# Patient Record
Sex: FEMALE | Race: WHITE | NOT HISPANIC OR LATINO | Employment: OTHER
[De-identification: names, ages, dates, MRNs, and addresses within clinical notes are randomized per-mention and may not be internally consistent; named-entity substitution may affect disease eponyms.]

---

## 2019-09-30 ENCOUNTER — HEALTH MAINTENANCE LETTER (OUTPATIENT)
Age: 79
End: 2019-09-30

## 2020-03-15 ENCOUNTER — HEALTH MAINTENANCE LETTER (OUTPATIENT)
Age: 80
End: 2020-03-15

## 2021-01-15 ENCOUNTER — HEALTH MAINTENANCE LETTER (OUTPATIENT)
Age: 81
End: 2021-01-15

## 2021-02-09 ENCOUNTER — IMMUNIZATION (OUTPATIENT)
Dept: NURSING | Facility: CLINIC | Age: 81
End: 2021-02-09
Payer: COMMERCIAL

## 2021-02-09 PROCEDURE — 0001A PR COVID VAC PFIZER DIL RECON 30 MCG/0.3 ML IM: CPT

## 2021-02-09 PROCEDURE — 91300 PR COVID VAC PFIZER DIL RECON 30 MCG/0.3 ML IM: CPT

## 2021-03-02 ENCOUNTER — IMMUNIZATION (OUTPATIENT)
Dept: NURSING | Facility: CLINIC | Age: 81
End: 2021-03-02
Attending: INTERNAL MEDICINE
Payer: COMMERCIAL

## 2021-03-02 PROCEDURE — 91300 PR COVID VAC PFIZER DIL RECON 30 MCG/0.3 ML IM: CPT

## 2021-03-02 PROCEDURE — 0002A PR COVID VAC PFIZER DIL RECON 30 MCG/0.3 ML IM: CPT

## 2021-05-09 ENCOUNTER — HEALTH MAINTENANCE LETTER (OUTPATIENT)
Age: 81
End: 2021-05-09

## 2021-10-24 ENCOUNTER — HEALTH MAINTENANCE LETTER (OUTPATIENT)
Age: 81
End: 2021-10-24

## 2022-06-05 ENCOUNTER — HEALTH MAINTENANCE LETTER (OUTPATIENT)
Age: 82
End: 2022-06-05

## 2022-08-07 ENCOUNTER — HOSPITAL ENCOUNTER (INPATIENT)
Facility: CLINIC | Age: 82
LOS: 1 days | Discharge: HOME | DRG: 602 | End: 2022-08-10
Attending: EMERGENCY MEDICINE | Admitting: HOSPITALIST
Payer: COMMERCIAL

## 2022-08-07 ENCOUNTER — APPOINTMENT (OUTPATIENT)
Dept: CT IMAGING | Facility: CLINIC | Age: 82
DRG: 602 | End: 2022-08-07
Attending: STUDENT IN AN ORGANIZED HEALTH CARE EDUCATION/TRAINING PROGRAM
Payer: COMMERCIAL

## 2022-08-07 ENCOUNTER — APPOINTMENT (OUTPATIENT)
Dept: ULTRASOUND IMAGING | Facility: CLINIC | Age: 82
DRG: 602 | End: 2022-08-07
Attending: STUDENT IN AN ORGANIZED HEALTH CARE EDUCATION/TRAINING PROGRAM
Payer: COMMERCIAL

## 2022-08-07 DIAGNOSIS — J69.0 ASPIRATION PNEUMONIA, UNSPECIFIED ASPIRATION PNEUMONIA TYPE, UNSPECIFIED LATERALITY, UNSPECIFIED PART OF LUNG (H): ICD-10-CM

## 2022-08-07 DIAGNOSIS — L50.9 HIVES: Primary | ICD-10-CM

## 2022-08-07 DIAGNOSIS — L03.115 CELLULITIS OF RIGHT LOWER EXTREMITY: ICD-10-CM

## 2022-08-07 LAB
ANION GAP SERPL CALCULATED.3IONS-SCNC: 5 MMOL/L (ref 7–15)
BASOPHILS # BLD AUTO: 0 10E3/UL (ref 0–0.2)
BASOPHILS NFR BLD AUTO: 0 %
BUN SERPL-MCNC: 14.1 MG/DL (ref 8–23)
CALCIUM SERPL-MCNC: 8.8 MG/DL (ref 8.8–10.2)
CHLORIDE SERPL-SCNC: 103 MMOL/L (ref 98–107)
CREAT SERPL-MCNC: 0.85 MG/DL (ref 0.51–0.95)
D DIMER PPP FEU-MCNC: 1.49 UG/ML FEU (ref 0–0.5)
DEPRECATED HCO3 PLAS-SCNC: 29 MMOL/L (ref 22–29)
EOSINOPHIL # BLD AUTO: 0.3 10E3/UL (ref 0–0.7)
EOSINOPHIL NFR BLD AUTO: 4 %
ERYTHROCYTE [DISTWIDTH] IN BLOOD BY AUTOMATED COUNT: 13 % (ref 10–15)
FLUAV RNA SPEC QL NAA+PROBE: NEGATIVE
FLUBV RNA RESP QL NAA+PROBE: NEGATIVE
GFR SERPL CREATININE-BSD FRML MDRD: 68 ML/MIN/1.73M2
GLUCOSE SERPL-MCNC: 99 MG/DL (ref 70–99)
HCT VFR BLD AUTO: 45.1 % (ref 35–47)
HGB BLD-MCNC: 13.9 G/DL (ref 11.7–15.7)
IMM GRANULOCYTES # BLD: 0 10E3/UL
IMM GRANULOCYTES NFR BLD: 0 %
LYMPHOCYTES # BLD AUTO: 1.2 10E3/UL (ref 0.8–5.3)
LYMPHOCYTES NFR BLD AUTO: 16 %
MCH RBC QN AUTO: 29.8 PG (ref 26.5–33)
MCHC RBC AUTO-ENTMCNC: 30.8 G/DL (ref 31.5–36.5)
MCV RBC AUTO: 97 FL (ref 78–100)
MONOCYTES # BLD AUTO: 0.5 10E3/UL (ref 0–1.3)
MONOCYTES NFR BLD AUTO: 7 %
NEUTROPHILS # BLD AUTO: 5.2 10E3/UL (ref 1.6–8.3)
NEUTROPHILS NFR BLD AUTO: 73 %
NRBC # BLD AUTO: 0 10E3/UL
NRBC BLD AUTO-RTO: 0 /100
PLATELET # BLD AUTO: 197 10E3/UL (ref 150–450)
POTASSIUM SERPL-SCNC: 4.5 MMOL/L (ref 3.4–5.3)
RBC # BLD AUTO: 4.67 10E6/UL (ref 3.8–5.2)
RSV RNA SPEC NAA+PROBE: NEGATIVE
SARS-COV-2 RNA RESP QL NAA+PROBE: NEGATIVE
SODIUM SERPL-SCNC: 137 MMOL/L (ref 136–145)
WBC # BLD AUTO: 7.1 10E3/UL (ref 4–11)

## 2022-08-07 PROCEDURE — 36415 COLL VENOUS BLD VENIPUNCTURE: CPT | Performed by: EMERGENCY MEDICINE

## 2022-08-07 PROCEDURE — 80048 BASIC METABOLIC PNL TOTAL CA: CPT | Performed by: EMERGENCY MEDICINE

## 2022-08-07 PROCEDURE — 71275 CT ANGIOGRAPHY CHEST: CPT

## 2022-08-07 PROCEDURE — 36415 COLL VENOUS BLD VENIPUNCTURE: CPT | Performed by: STUDENT IN AN ORGANIZED HEALTH CARE EDUCATION/TRAINING PROGRAM

## 2022-08-07 PROCEDURE — 250N000011 HC RX IP 250 OP 636: Performed by: HOSPITALIST

## 2022-08-07 PROCEDURE — C9803 HOPD COVID-19 SPEC COLLECT: HCPCS

## 2022-08-07 PROCEDURE — 96365 THER/PROPH/DIAG IV INF INIT: CPT

## 2022-08-07 PROCEDURE — 85025 COMPLETE CBC W/AUTO DIFF WBC: CPT | Performed by: EMERGENCY MEDICINE

## 2022-08-07 PROCEDURE — 250N000009 HC RX 250: Performed by: EMERGENCY MEDICINE

## 2022-08-07 PROCEDURE — 250N000013 HC RX MED GY IP 250 OP 250 PS 637: Performed by: HOSPITALIST

## 2022-08-07 PROCEDURE — 96375 TX/PRO/DX INJ NEW DRUG ADDON: CPT

## 2022-08-07 PROCEDURE — G0378 HOSPITAL OBSERVATION PER HR: HCPCS

## 2022-08-07 PROCEDURE — 96376 TX/PRO/DX INJ SAME DRUG ADON: CPT

## 2022-08-07 PROCEDURE — 94640 AIRWAY INHALATION TREATMENT: CPT

## 2022-08-07 PROCEDURE — 85379 FIBRIN DEGRADATION QUANT: CPT | Performed by: STUDENT IN AN ORGANIZED HEALTH CARE EDUCATION/TRAINING PROGRAM

## 2022-08-07 PROCEDURE — 99220 PR INITIAL OBSERVATION CARE,LEVEL III: CPT | Performed by: HOSPITALIST

## 2022-08-07 PROCEDURE — 250N000011 HC RX IP 250 OP 636: Performed by: EMERGENCY MEDICINE

## 2022-08-07 PROCEDURE — 93970 EXTREMITY STUDY: CPT

## 2022-08-07 PROCEDURE — 87637 SARSCOV2&INF A&B&RSV AMP PRB: CPT | Performed by: STUDENT IN AN ORGANIZED HEALTH CARE EDUCATION/TRAINING PROGRAM

## 2022-08-07 PROCEDURE — 93005 ELECTROCARDIOGRAM TRACING: CPT

## 2022-08-07 PROCEDURE — 258N000003 HC RX IP 258 OP 636: Performed by: STUDENT IN AN ORGANIZED HEALTH CARE EDUCATION/TRAINING PROGRAM

## 2022-08-07 PROCEDURE — 250N000011 HC RX IP 250 OP 636: Performed by: STUDENT IN AN ORGANIZED HEALTH CARE EDUCATION/TRAINING PROGRAM

## 2022-08-07 PROCEDURE — 250N000009 HC RX 250: Performed by: STUDENT IN AN ORGANIZED HEALTH CARE EDUCATION/TRAINING PROGRAM

## 2022-08-07 PROCEDURE — 99285 EMERGENCY DEPT VISIT HI MDM: CPT | Mod: CS,25

## 2022-08-07 RX ORDER — ACETAMINOPHEN 650 MG/1
650 SUPPOSITORY RECTAL EVERY 6 HOURS PRN
Status: DISCONTINUED | OUTPATIENT
Start: 2022-08-07 | End: 2022-08-10 | Stop reason: HOSPADM

## 2022-08-07 RX ORDER — DIPHENHYDRAMINE HCL 25 MG
25 CAPSULE ORAL EVERY 6 HOURS PRN
Status: DISCONTINUED | OUTPATIENT
Start: 2022-08-07 | End: 2022-08-08

## 2022-08-07 RX ORDER — GUAIFENESIN 600 MG/1
600 TABLET, EXTENDED RELEASE ORAL 2 TIMES DAILY
Status: DISCONTINUED | OUTPATIENT
Start: 2022-08-07 | End: 2022-08-09

## 2022-08-07 RX ORDER — AZITHROMYCIN 500 MG/5ML
500 INJECTION, POWDER, LYOPHILIZED, FOR SOLUTION INTRAVENOUS ONCE
Status: COMPLETED | OUTPATIENT
Start: 2022-08-07 | End: 2022-08-07

## 2022-08-07 RX ORDER — ONDANSETRON 4 MG/1
4 TABLET, ORALLY DISINTEGRATING ORAL EVERY 6 HOURS PRN
Status: DISCONTINUED | OUTPATIENT
Start: 2022-08-07 | End: 2022-08-10 | Stop reason: HOSPADM

## 2022-08-07 RX ORDER — DIPHENHYDRAMINE HYDROCHLORIDE 50 MG/ML
25 INJECTION INTRAMUSCULAR; INTRAVENOUS EVERY 6 HOURS PRN
Status: DISCONTINUED | OUTPATIENT
Start: 2022-08-07 | End: 2022-08-08

## 2022-08-07 RX ORDER — CEFTRIAXONE 1 G/1
1 INJECTION, POWDER, FOR SOLUTION INTRAMUSCULAR; INTRAVENOUS ONCE
Status: COMPLETED | OUTPATIENT
Start: 2022-08-07 | End: 2022-08-07

## 2022-08-07 RX ORDER — AMPICILLIN AND SULBACTAM 2; 1 G/1; G/1
3 INJECTION, POWDER, FOR SOLUTION INTRAMUSCULAR; INTRAVENOUS EVERY 6 HOURS
Status: DISCONTINUED | OUTPATIENT
Start: 2022-08-07 | End: 2022-08-10 | Stop reason: HOSPADM

## 2022-08-07 RX ORDER — PREDNISONE 20 MG/1
40 TABLET ORAL DAILY
Status: DISCONTINUED | OUTPATIENT
Start: 2022-08-08 | End: 2022-08-10 | Stop reason: HOSPADM

## 2022-08-07 RX ORDER — IOPAMIDOL 755 MG/ML
500 INJECTION, SOLUTION INTRAVASCULAR ONCE
Status: COMPLETED | OUTPATIENT
Start: 2022-08-07 | End: 2022-08-07

## 2022-08-07 RX ORDER — ONDANSETRON 2 MG/ML
4 INJECTION INTRAMUSCULAR; INTRAVENOUS EVERY 6 HOURS PRN
Status: DISCONTINUED | OUTPATIENT
Start: 2022-08-07 | End: 2022-08-10 | Stop reason: HOSPADM

## 2022-08-07 RX ORDER — ACETAMINOPHEN 325 MG/1
650 TABLET ORAL EVERY 6 HOURS PRN
Status: DISCONTINUED | OUTPATIENT
Start: 2022-08-07 | End: 2022-08-10 | Stop reason: HOSPADM

## 2022-08-07 RX ORDER — METHYLPREDNISOLONE SODIUM SUCCINATE 125 MG/2ML
125 INJECTION, POWDER, LYOPHILIZED, FOR SOLUTION INTRAMUSCULAR; INTRAVENOUS ONCE
Status: COMPLETED | OUTPATIENT
Start: 2022-08-07 | End: 2022-08-07

## 2022-08-07 RX ORDER — IPRATROPIUM BROMIDE AND ALBUTEROL SULFATE 2.5; .5 MG/3ML; MG/3ML
6 SOLUTION RESPIRATORY (INHALATION) ONCE
Status: COMPLETED | OUTPATIENT
Start: 2022-08-07 | End: 2022-08-07

## 2022-08-07 RX ORDER — CETIRIZINE HYDROCHLORIDE 10 MG/1
10 TABLET ORAL 2 TIMES DAILY
Status: DISCONTINUED | OUTPATIENT
Start: 2022-08-07 | End: 2022-08-10 | Stop reason: HOSPADM

## 2022-08-07 RX ORDER — BENZONATATE 100 MG/1
100 CAPSULE ORAL 3 TIMES DAILY PRN
Status: DISCONTINUED | OUTPATIENT
Start: 2022-08-07 | End: 2022-08-09

## 2022-08-07 RX ORDER — CETIRIZINE HYDROCHLORIDE 10 MG/1
10 TABLET ORAL 2 TIMES DAILY
COMMUNITY
End: 2023-11-20

## 2022-08-07 RX ADMIN — CETIRIZINE HYDROCHLORIDE 10 MG: 10 TABLET, FILM COATED ORAL at 19:46

## 2022-08-07 RX ADMIN — AMPICILLIN SODIUM AND SULBACTAM SODIUM 3 G: 2; 1 INJECTION, POWDER, FOR SOLUTION INTRAMUSCULAR; INTRAVENOUS at 19:43

## 2022-08-07 RX ADMIN — GUAIFENESIN 600 MG: 600 TABLET, EXTENDED RELEASE ORAL at 20:24

## 2022-08-07 RX ADMIN — CEFTRIAXONE SODIUM 1 G: 1 INJECTION, POWDER, FOR SOLUTION INTRAMUSCULAR; INTRAVENOUS at 16:13

## 2022-08-07 RX ADMIN — SODIUM CHLORIDE 70 ML: 9 INJECTION, SOLUTION INTRAVENOUS at 15:47

## 2022-08-07 RX ADMIN — BENZONATATE 100 MG: 100 CAPSULE ORAL at 20:24

## 2022-08-07 RX ADMIN — IOPAMIDOL 65 ML: 755 INJECTION, SOLUTION INTRAVENOUS at 15:47

## 2022-08-07 RX ADMIN — METHYLPREDNISOLONE SODIUM SUCCINATE 125 MG: 125 INJECTION, POWDER, FOR SOLUTION INTRAMUSCULAR; INTRAVENOUS at 16:12

## 2022-08-07 RX ADMIN — IPRATROPIUM BROMIDE AND ALBUTEROL SULFATE 6 ML: .5; 3 SOLUTION RESPIRATORY (INHALATION) at 16:13

## 2022-08-07 RX ADMIN — AZITHROMYCIN MONOHYDRATE 500 MG: 500 INJECTION, POWDER, LYOPHILIZED, FOR SOLUTION INTRAVENOUS at 17:38

## 2022-08-07 ASSESSMENT — ENCOUNTER SYMPTOMS
COUGH: 1
NAUSEA: 0
HEMATURIA: 0
SORE THROAT: 0
SINUS PAIN: 0
ABDOMINAL PAIN: 0
FEVER: 0
SHORTNESS OF BREATH: 1
HEADACHES: 0
VOMITING: 0
DIARRHEA: 0
DIZZINESS: 0
DYSURIA: 0
CHILLS: 0

## 2022-08-07 NOTE — ED PROVIDER NOTES
History     Chief Complaint:  Rash and shortness of breath     HPI   Keisha Kolb is a 82 year old female with past medical history including TIAs, anxiety, and hyperlipidemia, who presents with rash and shortness of breath.  Patient reports that she was diagnosed TIAs approximately 1/2 months ago, and she was initiated on Aricept.  She had hives to her bilateral lower extremities following this, and she was initiated on Benadryl.  She had increased confusion with benadryl, and she switched to prednisone.  She was started on Zyrtec twice a day by her primary care provider on 7/11/2022, and she is still taking this.  She states that it had been helping for a couple of weeks, but then the hives began to return on 7/29/2022.  She states that 11 days ago, she also began to have a dry cough.  She tested negative for COVID.  She has been having increased shortness of breath as well as wheezing since yesterday.  She states she has pain under her bilateral ribs, right greater than left when she coughs.  She denies chest pain, abdominal pain, nausea, vomiting, or headache.         ROS:  Review of Systems   Constitutional: Negative for chills and fever.   HENT: Negative for ear pain, sinus pain and sore throat.    Respiratory: Positive for cough and shortness of breath.    Cardiovascular: Positive for leg swelling. Negative for chest pain.   Gastrointestinal: Negative for abdominal pain, diarrhea, nausea and vomiting.   Genitourinary: Negative for dysuria and hematuria.   Skin: Positive for rash.   Neurological: Negative for dizziness and headaches.   All other systems reviewed and are negative.      Allergies:  No Known Allergies     Medications:    ASPIRIN PO  CITALOPRAM HYDROBROMIDE PO  LORAZEPAM PO  SIMVASTATIN PO  traMADol (ULTRAM) 50 MG tablet        Past Medical History:    Past Medical History:   Diagnosis Date     Anxiety        Past Surgical History:    Past Surgical History:   Procedure Laterality Date      "masectomy          Family History:    family history is not on file.    Social History:  20-pack-year smoking history.  Not currently smoking.  Presents to the ED with her granddaughter, Annika.  PCP: [facility], [facility] [place]     Physical Exam     Patient Vitals for the past 24 hrs:   BP Temp Temp src Pulse Resp SpO2 Height Weight   08/07/22 1400 (!) 163/80 -- -- 73 -- 97 % -- --   08/07/22 1335 -- 98.4  F (36.9  C) Oral -- -- -- -- --   08/07/22 1330 (!) 169/76 -- -- -- -- 98 % -- --   08/07/22 1115 (!) 135/119 -- -- -- 20 -- -- --   08/07/22 1113 -- 97.7  F (36.5  C) Oral 68 -- 95 % 1.626 m (5' 4\") 84.5 kg (186 lb 4.6 oz)        Physical Exam  Vitals: Reviewed, as above.   General: Alert and oriented, in mild distress. Resting on bed.  Skin: Warm and well-perfused.  Urticarial, blanching rash to bilateral lower extremities.  Multiple scabs in various stages of healing to right anterior lower leg. Erythema extending from right foot to right mid shin.  Please see wound photos, below.  HEENT: Head: Normocephalic, atraumatic. Facial features symmetric. Eyes: Conjunctiva pink, sclera white. EOMs grossly intact. Ears: Auricles without lesion, erythema, or edema. Mouth and throat: Lips are moist with no chapping, lesions, or edema, Buccal mucosa is pink and moist without lesions. Oropharyngeal mucosa is pink and moist with no erythema, edema, or exudate.   Neck: Supple with no lymphadenopathy. Full ROM.   Pulmonary: Chest wall expansion symmetric with no increased work of breathing. Lungs with end expiratory wheezes on auscultation bilaterally.   Cardiovascular: Heart RRR with no murmurs, rubs, or gallops. 2+ radial, dorsalis pedis, and tibialis posterior pulses bilaterally.  2+ pitting edema to bilateral lower extremities to the level of the mid shin, right slightly greater than left.  Abdominal: No hernias, scars, lesions, striae, or distension. Bowel sounds present and physiologic. Abdomen is soft and nontender to " light and deep palpation in all 4 quadrants with no guarding or rebound. No masses or organomegaly.   Musculoskeletal: Moves all extremities spontaneously.  Psych: Affect appropriate.  Answers questions appropriately. Patient appears calm.                   Emergency Department Course   ECG:  ECG results from 08/07/22   EKG 12-lead, tracing only     Value    Systolic Blood Pressure     Diastolic Blood Pressure     Ventricular Rate 68    Atrial Rate 68    NY Interval 148    QRS Duration 116        QTc 457    P Axis 37    R AXIS 68    T Axis 25    Interpretation ECG      Sinus rhythm  Incomplete right bundle branch block  Borderline ECG  No previous ECGs available         Imaging:  CT Chest Pulmonary Embolism w Contrast   Final Result   IMPRESSION:      1.  No pulmonary embolism.      2.  Nonaneurysmal aorta without dissection.      3.  Moderate lower lobe predominant airway debris, raising concern for aspiration.      4.  Mild airway thickening / bronchitis. Probable mild bronchiectasis.            US Lower Extremity Venous Duplex Bilateral   Final Result   IMPRESSION:   1.  No deep venous thrombosis in the bilateral lower extremities.             Laboratory:  Labs Ordered and Resulted from Time of ED Arrival to Time of ED Departure   BASIC METABOLIC PANEL - Abnormal       Result Value    Creatinine 0.85      Sodium 137      Potassium 4.5      Urea Nitrogen 14.1      Chloride 103      Carbon Dioxide (CO2) 29      Anion Gap 5 (*)     Glucose 99      GFR Estimate 68      Calcium 8.8     CBC WITH PLATELETS AND DIFFERENTIAL - Abnormal    WBC Count 7.1      RBC Count 4.67      Hemoglobin 13.9      Hematocrit 45.1      MCV 97      MCH 29.8      MCHC 30.8 (*)     RDW 13.0      Platelet Count 197      % Neutrophils 73      % Lymphocytes 16      % Monocytes 7      % Eosinophils 4      % Basophils 0      % Immature Granulocytes 0      NRBCs per 100 WBC 0      Absolute Neutrophils 5.2      Absolute Lymphocytes 1.2       Absolute Monocytes 0.5      Absolute Eosinophils 0.3      Absolute Basophils 0.0      Absolute Immature Granulocytes 0.0      Absolute NRBCs 0.0     D DIMER QUANTITATIVE - Abnormal    D-Dimer Quantitative 1.49 (*)    INFLUENZA A/B & SARS-COV2 PCR MULTIPLEX - Normal    Influenza A PCR Negative      Influenza B PCR Negative      RSV PCR Negative      SARS CoV2 PCR Negative          Emergency Department Course:     Reviewed:  I reviewed nursing notes, vitals and past medical history    Assessments?Consults:   ED Course as of 08/07/22 1706   Sun Aug 07, 2022   1310 I obtained history and examined the patient, as noted above.   1535 I rechecked the patient and explained findings.   1659 I spoke with Dr. Wheeler, hospitalist, regarding the patient.  He will accept the patient for admission.         Interventions:  Medications   azithromycin 500 mg (ZITHROMAX) in 0.9% NaCl 250 mL intermittent infusion 500 mg (has no administration in time range)   ipratropium - albuterol 0.5 mg/2.5 mg/3 mL (DUONEB) neb solution 6 mL (6 mLs Nebulization Given 8/7/22 1613)   cefTRIAXone (ROCEPHIN) 1 g vial to attach to  mL bag for ADULTS or NS 50 mL bag for PEDS (1 g Intravenous New Bag 8/7/22 1613)   methylPREDNISolone sodium succinate (solu-MEDROL) injection 125 mg (125 mg Intravenous Given 8/7/22 1612)   sodium chloride for CT scan flush use (70 mLs Intravenous Given 8/7/22 1547)   iopamidol (ISOVUE-370) solution 500 mL (65 mLs Intravenous Given 8/7/22 1547)        Disposition:  The patient was admitted to the hospital under the care of Dr. Wheeler.     Impression & Plan      Medical Decision Making:  Keisha is a 82 year old female with past medical history including TIAs, anxiety, and hyperlipidemia, who presents with rash and shortness of breath.  Please see HPI and physical exam for full details.  Differential diagnosis included cellulitis, DVT, PE, pneumonia, bronchitis, COVID, influenza, asthma or COPD exasperation, among others.   Patient has a physical exam concerning for right lower extremity cellulitis.  Please see wound photos.  Bilateral lower extremity Dopplers are thankfully negative for  DVT.  D-dimer was elevated, and CT angiogram was obtained, which was negative for PE.  This did reveal a vertebrae, concerning for aspiration.  Patient was initiated on ceftriaxone and azithromycin for cellulitis and possible mild aspiration pneumonia or pneumonitis.  Due to need for IV antibiotics, patient will benefit from inpatient treatment.  I discussed with Dr. Wheeler, who graciously accepts the patient for observation.        Diagnosis:    ICD-10-CM    1. Cellulitis of right lower extremity  L03.115    2. Aspiration pneumonia, unspecified aspiration pneumonia type, unspecified laterality, unspecified part of lung (H)  J69.0              Meliza Ch PA-C  08/07/22 1706

## 2022-08-07 NOTE — PHARMACY-ADMISSION MEDICATION HISTORY
Admission medication history interview status for this patient is complete. See [facility] admission navigator for allergy information, prior to admission medications and immunization status.     Medication history interview done, indicate source(s): Devendra (daughter) @ [phone]  Medication history resources (including written lists, pill bottles, clinic record):None      Changes made to PTA medication list:  Added: all  Changed: none  Reported as Not Taking: none  Removed: Ultram    Actions taken by pharmacist (provider contacted, etc):None     Additional medication history information:None    Medication reconciliation/reorder completed by provider prior to medication history?  n   (Y/N)     For patients on insulin therapy:   Do you use sliding scale insulin based on blood sugars?   What is your pre-meal insulin coverage?    Do you typically eat three meals a day?   How many times do you check your blood glucose per day?   How many episodes of hypoglycemia do you typically have per month?   Do you have a Continuous Glucose Monitor (CGM)?      Prior to Admission medications    Medication Sig Last Dose Taking? Auth Provider Long Term End Date   cetirizine (ZYRTEC) 10 MG tablet Take 10 mg by mouth 2 times daily 8/7/2022 at am Yes Unknown, Entered By History     CITALOPRAM HYDROBROMIDE PO Take 40 mg by mouth At Bedtime 8/6/2022 at Unknown time Yes Reported, Patient Yes    LORAZEPAM PO Take 1 mg by mouth every morning 8/7/2022 at am Yes Reported, Patient     SIMVASTATIN PO Take 20 mg by mouth At Bedtime 8/6/2022 at Unknown time Yes Reported, Patient Yes

## 2022-08-07 NOTE — ED TRIAGE NOTES
Pt arrives to the ED due to having a dry cough and increased SOB for past 11 days. States pain in her ribs when she coughs. Pt also has a rash to her right and left lower legs that she states has been going on for a couple weeks. Pt states that she was started on Aricept for memory issues and that is when the rash began. Stopped the medication and was put on prednisone and zyrtec to help with rash. Rash still not improving and is having increased redness, swelling to right lower leg. Pt has apt with allergist next week.

## 2022-08-07 NOTE — H&P
[facility]    History and Physical  Hospitalist       Date of Admission:  8/7/2022    Assessment & Plan   Keisha Kolb is a 82 year old female with a past medical history of anxiety, hyperlipidemia who presents with skin lesions.    #Urticarial skin lesions with likely superimposed cellulitis of RLE: Patient was at a wedding in mid June when she started to notice raised, itchy skin lesions on her bilateral lower extremity and lower back.  Not painful.  She did not have any associated fevers or chills.  She did recently start Aricept in the days prior which is since been discontinued.  She denies any changes in detergents or known bug bites.  No changes in clothing.  She could not think of any other exposures at that time.  She saw her primary care who prescribed her a prednisone course and cetirizine.  Her symptoms did improve but then recurred on/off.  She comes in today for recurrence of her lesions along with swelling and warmth of her right lower extremity.  She denies any chest pain.  She has had some shortness of breath over the last few days but no wheezing.  No nausea vomiting.  No chest pain.  No changes in her voice.  No known allergies.  -ER, patient afebrile and hemodynamically stable.  CBC without any leukocytosis.  BMP unremarkable.  D-dimer was positive.  CT PE was negative for PE but did show possible aspiration pneumonia.  Lower extremity ultrasound negative for DVT.  Patient was given Methylpred, ceftriaxone, azithromycin.  -Suspect right lower extremity cellulitis from excessive itching from urticarial skin lesions.  She is not septic.  These lesions have been ongoing for close to 2 months now.  -We will start prednisone 40 mg daily for 5 days.  Increase cetirizine to 10 mg twice daily.  Benadryl as needed for itching.  -We will treat with Unasyn for right lower extremity cellulitis and cover for aspiration.  Keep right lower extremity elevated.  -Patient does have an  appointment with allergist in the coming weeks.    #Possible aspiration pna: Patient has noted some shortness of breath but no wheezing.  No stridor.  CT PE negative for PE but did show possible aspiration.  She denies any nausea vomiting.  No difficulty swallowing.  -Unasyn as above.  She is saturating well on room air.  Monitor.    #Anxiety: Continue home meds once verified  #HL: Continue home statin    DVT Prophylaxis: Pneumatic Compression Devices  Code Status: DNR.  Okay with temporary intubation.  Would not want prolonged intubation.  Dispo: Registered observation.    Miky Wheeler MD    Primary Care Physician   [facility] [place] [facility]    Chief Complaint   Skin lesions, itching    History is obtained from the patient, patient's daughter, patient's chart and discussed with ER provider    History of Present Illness   Keisha Kolb is a 82 year old female with a past medical history of anxiety, hyperlipidemia who presents with skin lesions.    Patient was at a wedding in mid June when she started to notice raised, itchy skin lesions on her bilateral lower extremity and lower back.  Not painful.  She did not have any associated fevers or chills.  She did recently start Aricept in the days prior which is since been discontinued.  She denies any changes in detergents or known bug bites.  No changes in clothing.  She could not think of any other exposures at that time.  She saw her primary care who prescribed her a prednisone course and cetirizine.  Her symptoms did improve but then recurred on/off.  She comes in today for recurrence of her lesions along with swelling and warmth of her right lower extremity.  She denies any chest pain.  She has had some shortness of breath over the last few days but no wheezing.  No nausea vomiting.  No chest pain.  No changes in her voice.  No known allergies.    In the ER, patient afebrile and hemodynamically stable.  CBC without any leukocytosis.  BMP unremarkable.  D-dimer  was positive.  CT PE was negative for PE but did show possible aspiration pneumonia.  Lower extremity ultrasound negative for DVT.  Patient was given Methylpred, ceftriaxone, azithromycin.    Past Medical History    I have reviewed this patient's medical history and updated it with pertinent information if needed.   Past Medical History:   Diagnosis Date     Anxiety        Past Surgical History   I have reviewed this patient's surgical history and updated it with pertinent information if needed.  Past Surgical History:   Procedure Laterality Date     masectomy         Prior to Admission Medications   Prior to Admission Medications   Prescriptions Last Dose Informant Patient Reported? Taking?   ASPIRIN PO   Yes No   Sig: Take 81 mg by mouth   CITALOPRAM HYDROBROMIDE PO   Yes No   LORAZEPAM PO   Yes No   SIMVASTATIN PO   Yes No   traMADol (ULTRAM) 50 MG tablet   No No   Sig: Take 1 tablet (50 mg) by mouth every 6 hours as needed for moderate pain      Facility-Administered Medications: None     Allergies   No Known Allergies    Social History   I have reviewed this patient's social history and updated it with pertinent information if needed. Keisha Kolb  reports that she has never smoked. She does not have any smokeless tobacco history on file. She reports that she does not drink alcohol and does not use drugs.    Family History   I have reviewed this patient's family history and updated it with pertinent information if needed.   Noncontributory    Review of Systems   The 10 point Review of Systems is negative other than noted in the HPI or here.     Physical Exam   Temp: 98.4  F (36.9  C) Temp src: Oral BP: (!) 163/80 Pulse: 73   Resp: 20 SpO2: 97 % O2 Device: None (Room air)    Vital Signs with Ranges  Temp:  [97.7  F (36.5  C)-98.4  F (36.9  C)] 98.4  F (36.9  C)  Pulse:  [68-73] 73  Resp:  [20] 20  BP: (135-169)/() 163/80  SpO2:  [95 %-98 %] 97 %  186 lbs 4.62 oz    Constitutional: NAD, Nontoxic.   Conversational.  Pleasant  HEENT: Normocephalic, MMM, no elevation of JVD noted  Respiratory: Nl WOB, Clear bilaterally, No wheezes, no crackles.  No stridor.  Conversational.  Cardiovascular: Regular, no murmur  GI: BS+, NT, ND, no rebound or guarding  Lymph/Hematologic: No bruising. No cervical LAD  Skin: Patient with raised, pruritic lesions most significant in bilateral lower extremities.  She also has milder lesions on both arms.  Some on the lower back noted.  She has warmth and swelling of her distal right lower extremity.  Musculoskeletal: Nl Tone, mild swelling of the right lower extremity as above  Neurologic: A&Ox3, Answers appropriately. CNII-XII intact. Moves all extremities. No tremor  Psychiatric: Calm    Data   Data reviewed today:  I personally reviewed  Recent Labs   Lab 08/07/22  1306   WBC 7.1   HGB 13.9   MCV 97         POTASSIUM 4.5   CHLORIDE 103   CO2 29   BUN 14.1   CR 0.85   ANIONGAP 5*   KRISTEL 8.8   GLC 99       Recent Results (from the past 24 hour(s))   US Lower Extremity Venous Duplex Bilateral    Narrative    EXAM: US LOWER EXTREMITY VENOUS DUPLEX BILATERAL  LOCATION: [facility]  DATE/TIME: 8/7/2022 1:39 PM    INDICATION: swelling, pain  COMPARISON: None.  TECHNIQUE: Venous Duplex ultrasound of bilateral lower extremities with and without compression, augmentation and duplex. Color flow and spectral Doppler with waveform analysis performed.    FINDINGS: Exam includes the common femoral, femoral, popliteal veins as well as segmentally visualized deep calf veins and greater saphenous vein.     RIGHT: No deep vein thrombosis. No superficial thrombophlebitis. No popliteal cyst.    LEFT: No deep vein thrombosis. No superficial thrombophlebitis. No popliteal cyst.      Impression    IMPRESSION:  1.  No deep venous thrombosis in the bilateral lower extremities.   CT Chest Pulmonary Embolism w Contrast    Narrative    EXAM: CT CHEST PULMONARY EMBOLISM W  "CONTRAST  LOCATION: [facility]  DATE/TIME: 8/7/2022 3:45 PM    INDICATION: Dyspnea. Positive d-dimer.  COMPARISON: None.  TECHNIQUE: CT chest pulmonary angiogram during arterial phase injection of IV contrast. Multiplanar reformats and MIP reconstructions were performed. Dose reduction techniques were used.   CONTRAST: 65mL Isovue 370.    FINDINGS:  ANGIOGRAM CHEST: Mild motion. No pulmonary embolism. Nonaneurysmal aorta without dissection. Mild to moderate atherosclerosis.    LUNGS AND PLEURA: Moderate right greater than left lower lobe predominant endobronchial contents. Mild bibasilar volume loss and atelectasis. No pleural effusion or pneumothorax.    MEDIASTINUM/AXILLAE: Few borderline enlarged nodes are presumed reactive. Upper normal heart size. No pericardial effusion. Small hiatus hernia.    CORONARY ARTERY CALCIFICATION: Compromised by motion.    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     MUSCULOSKELETAL: Bony demineralization and degenerative changes. Left mastectomy.      Impression    IMPRESSION:    1.  No pulmonary embolism.    2.  Nonaneurysmal aorta without dissection.    3.  Moderate lower lobe predominant airway debris, raising concern for aspiration.    4.  Mild airway thickening / bronchitis. Probable mild bronchiectasis.           Clinically Significant Risk Factors Present on Admission                # Platelet Defect: home medication list includes an antiplatelet medication     # Obesity: Estimated body mass index is 31.98 kg/m  as calculated from the following:    Height as of this encounter: 1.626 m (5' 4\").    Weight as of this encounter: 84.5 kg (186 lb 4.6 oz).           "

## 2022-08-07 NOTE — ED PROVIDER NOTES
Emergency Department Attending Supervision Note  8/7/2022  1:30 PM      I evaluated this patient with ANASTACIA Choi.       Briefly, the patient presented with 1 month of diffuse urticarial rash thought secondary to Aricept which she stopped 1 month ago who has trialed steroids and Zyrtec with intermittent improvement now with several days of increasing redness to her right lower leg and left anterior lower leg.  She does endorse some shortness of breath, cough and swelling of her right leg greater than the left.  She is a former smoker but quit decades ago.      On my exam, diffuse blanching urticarial rash with marked right lower extremity erythema that is blanching, warm to the touch extending from upper mid shin down onto the dorsum of the foot, there is a small focal area of anterior redness of the left lower leg as well.  She does have diffuse expiratory wheezes bilaterally, no overt rhonchi, but prolonged expiratory phase .    Workup is notable for:  Labs Ordered and Resulted from Time of ED Arrival to Time of ED Departure   BASIC METABOLIC PANEL - Abnormal       Result Value    Creatinine 0.85      Sodium 137      Potassium 4.5      Urea Nitrogen 14.1      Chloride 103      Carbon Dioxide (CO2) 29      Anion Gap 5 (*)     Glucose 99      GFR Estimate 68      Calcium 8.8     CBC WITH PLATELETS AND DIFFERENTIAL - Abnormal    WBC Count 7.1      RBC Count 4.67      Hemoglobin 13.9      Hematocrit 45.1      MCV 97      MCH 29.8      MCHC 30.8 (*)     RDW 13.0      Platelet Count 197      % Neutrophils 73      % Lymphocytes 16      % Monocytes 7      % Eosinophils 4      % Basophils 0      % Immature Granulocytes 0      NRBCs per 100 WBC 0      Absolute Neutrophils 5.2      Absolute Lymphocytes 1.2      Absolute Monocytes 0.5      Absolute Eosinophils 0.3      Absolute Basophils 0.0      Absolute Immature Granulocytes 0.0      Absolute NRBCs 0.0     D DIMER QUANTITATIVE - Abnormal    D-Dimer Quantitative 1.49 (*)     INFLUENZA A/B & SARS-COV2 PCR MULTIPLEX - Normal    Influenza A PCR Negative      Influenza B PCR Negative      RSV PCR Negative      SARS CoV2 PCR Negative       CT Chest Pulmonary Embolism w Contrast   Final Result   IMPRESSION:      1.  No pulmonary embolism.      2.  Nonaneurysmal aorta without dissection.      3.  Moderate lower lobe predominant airway debris, raising concern for aspiration.      4.  Mild airway thickening / bronchitis. Probable mild bronchiectasis.            US Lower Extremity Venous Duplex Bilateral   Final Result   IMPRESSION:   1.  No deep venous thrombosis in the bilateral lower extremities.           In summary, 82-year-old woman with bilateral, right greater than left lower extremity cellulitis.  She is afebrile here with no segment leukocytosis however given the degree of involvement, rapid spread to the right foot I do think IV antibiotics are warranted.  Ultrasound was negative for DVT however given her complaints of chest pain, D-dimer was sent this was elevated prompting CT pulm angiogram which was negative for PE but does show potential aspiration of the right lower lobe.  She was covered with ceftriaxone and azithromycin which should cover for skin infection as well.  She does have some prolonged expiratory phase as well and is a former smoker, question if there is some underlying COPD.  As such she was given breathing treatments and steroids.  She is being treated intermittently for recurrent urticarial rash, thought secondary to Aricept which she stopped 1 month ago.  I suspect she likely has a secondary cellulitis from excoriation of her urticaria.    (L03.115) Cellulitis of right lower extremity  (J69.0) Aspiration pneumonia, unspecified aspiration pneumonia type, unspecified laterality, unspecified part of lung (H)        Disposition:  Admit to medicine    Brennan Plata MD  [facility]  Replaced by [facility]    1:30 PM 08/07/22             Brennan Plata  MD [place]  08/07/22 [id number]

## 2022-08-07 NOTE — ED NOTES
[facility]  ED Nurse Handoff Report    Keisha Kolb is a 82 year old female   ED Chief complaint: No chief complaint on file.  . ED Diagnosis:   Final diagnoses:   Cellulitis of right lower extremity   Aspiration pneumonia, unspecified aspiration pneumonia type, unspecified laterality, unspecified part of lung (H)     Allergies: No Known Allergies    Code Status: Full Code  Activity level - Baseline/Home:  Independent. Activity Level - Current:   Stand by Assist. Lift room needed: No. Bariatric: No   Needed: No   Isolation: No. Infection: Not Applicable.     Vital Signs:   Vitals:    08/07/22 1115 08/07/22 1330 08/07/22 1335 08/07/22 1400   BP: (!) 135/119 (!) 169/76  (!) 163/80   Pulse:    73   Resp: 20      Temp:   98.4  F (36.9  C)    TempSrc:   Oral    SpO2:  98%  97%   Weight:       Height:           Cardiac Rhythm:  ,      Pain level:    Patient confused: No. Patient Falls Risk: Yes.   Elimination Status: Has voided   Patient Report - Initial Complaint: wound check, SOB. Focused Assessment: Keisha Kolb is a 82 year old female with past medical history including TIAs, anxiety, and hyperlipidemia, who presents with rash and shortness of breath.  Patient reports that she was diagnosed TIAs approximately 1/2 months ago, and she was initiated on Aricept.  She had hives to her bilateral lower extremities following this, and she was initiated on Benadryl.  She had increased confusion with benadryl, and she switched to prednisone.  She was started on Zyrtec twice a day by her primary care provider on 7/11/2022, and she is still taking this.  She states that it had been helping for a couple of weeks, but then the hives began to return on 7/29/2022.  She states that 11 days ago, she also began to have a dry cough.  She tested negative for COVID.  She has been having increased shortness of breath as well as wheezing since yesterday.  She states she has pain under her bilateral ribs,  right greater than left when she coughs.  She denies chest pain, abdominal pain, nausea, vomiting, or headache.       Tests Performed: CT, XR, labs. Abnormal Results:   Labs Ordered and Resulted from Time of ED Arrival to Time of ED Departure   BASIC METABOLIC PANEL - Abnormal       Result Value    Creatinine 0.85      Sodium 137      Potassium 4.5      Urea Nitrogen 14.1      Chloride 103      Carbon Dioxide (CO2) 29      Anion Gap 5 (*)     Glucose 99      GFR Estimate 68      Calcium 8.8     CBC WITH PLATELETS AND DIFFERENTIAL - Abnormal    WBC Count 7.1      RBC Count 4.67      Hemoglobin 13.9      Hematocrit 45.1      MCV 97      MCH 29.8      MCHC 30.8 (*)     RDW 13.0      Platelet Count 197      % Neutrophils 73      % Lymphocytes 16      % Monocytes 7      % Eosinophils 4      % Basophils 0      % Immature Granulocytes 0      NRBCs per 100 WBC 0      Absolute Neutrophils 5.2      Absolute Lymphocytes 1.2      Absolute Monocytes 0.5      Absolute Eosinophils 0.3      Absolute Basophils 0.0      Absolute Immature Granulocytes 0.0      Absolute NRBCs 0.0     D DIMER QUANTITATIVE - Abnormal    D-Dimer Quantitative 1.49 (*)    INFLUENZA A/B & SARS-COV2 PCR MULTIPLEX - Normal    Influenza A PCR Negative      Influenza B PCR Negative      RSV PCR Negative      SARS CoV2 PCR Negative       .   Treatments provided: see MAR  Family Comments: family at bedside  OBS brochure/video discussed/provided to patient:  Yes  ED Medications:   Medications   azithromycin 500 mg (ZITHROMAX) in 0.9% NaCl 250 mL intermittent infusion 500 mg (has no administration in time range)   ipratropium - albuterol 0.5 mg/2.5 mg/3 mL (DUONEB) neb solution 6 mL (6 mLs Nebulization Given 8/7/22 1613)   cefTRIAXone (ROCEPHIN) 1 g vial to attach to  mL bag for ADULTS or NS 50 mL bag for PEDS (1 g Intravenous New Bag 8/7/22 1613)   methylPREDNISolone sodium succinate (solu-MEDROL) injection 125 mg (125 mg Intravenous Given 8/7/22 1612)    sodium chloride for CT scan flush use (70 mLs Intravenous Given 8/7/22 1547)   iopamidol (ISOVUE-370) solution 500 mL (65 mLs Intravenous Given 8/7/22 1547)     Drips infusing:  Yes  For the majority of the shift, the patient's behavior Green. Interventions performed were NA.    Sepsis treatment initiated: No     Patient tested for COVID 19 prior to admission: YES    ED Nurse Name/Phone Number: Daniela Nielsen RN,   5:06 PM    RECEIVING UNIT ED HANDOFF REVIEW    Above ED Nurse Handoff Report was reviewed: Yes  Reviewed by: Mechelle Booth RN on August 7, 2022 at 6:12 PM

## 2022-08-08 LAB
ANION GAP SERPL CALCULATED.3IONS-SCNC: 10 MMOL/L (ref 7–15)
ATRIAL RATE - MUSE: 68 BPM
BUN SERPL-MCNC: 13.1 MG/DL (ref 8–23)
CALCIUM SERPL-MCNC: 8.7 MG/DL (ref 8.8–10.2)
CHLORIDE SERPL-SCNC: 105 MMOL/L (ref 98–107)
CREAT SERPL-MCNC: 0.75 MG/DL (ref 0.51–0.95)
DEPRECATED HCO3 PLAS-SCNC: 25 MMOL/L (ref 22–29)
DIASTOLIC BLOOD PRESSURE - MUSE: NORMAL MMHG
ERYTHROCYTE [DISTWIDTH] IN BLOOD BY AUTOMATED COUNT: 12.5 % (ref 10–15)
GFR SERPL CREATININE-BSD FRML MDRD: 79 ML/MIN/1.73M2
GLUCOSE SERPL-MCNC: 182 MG/DL (ref 70–99)
HCT VFR BLD AUTO: 41.7 % (ref 35–47)
HGB BLD-MCNC: 13.1 G/DL (ref 11.7–15.7)
INTERPRETATION ECG - MUSE: NORMAL
MCH RBC QN AUTO: 29.6 PG (ref 26.5–33)
MCHC RBC AUTO-ENTMCNC: 31.4 G/DL (ref 31.5–36.5)
MCV RBC AUTO: 94 FL (ref 78–100)
P AXIS - MUSE: 37 DEGREES
PLATELET # BLD AUTO: 198 10E3/UL (ref 150–450)
POTASSIUM SERPL-SCNC: 4.6 MMOL/L (ref 3.4–5.3)
PR INTERVAL - MUSE: 148 MS
QRS DURATION - MUSE: 116 MS
QT - MUSE: 430 MS
QTC - MUSE: 457 MS
R AXIS - MUSE: 68 DEGREES
RBC # BLD AUTO: 4.42 10E6/UL (ref 3.8–5.2)
SODIUM SERPL-SCNC: 140 MMOL/L (ref 136–145)
SYSTOLIC BLOOD PRESSURE - MUSE: NORMAL MMHG
T AXIS - MUSE: 25 DEGREES
VENTRICULAR RATE- MUSE: 68 BPM
WBC # BLD AUTO: 8.6 10E3/UL (ref 4–11)

## 2022-08-08 PROCEDURE — G0378 HOSPITAL OBSERVATION PER HR: HCPCS

## 2022-08-08 PROCEDURE — 250N000013 HC RX MED GY IP 250 OP 250 PS 637: Performed by: HOSPITALIST

## 2022-08-08 PROCEDURE — 250N000009 HC RX 250: Performed by: PHYSICIAN ASSISTANT

## 2022-08-08 PROCEDURE — 36415 COLL VENOUS BLD VENIPUNCTURE: CPT | Performed by: HOSPITALIST

## 2022-08-08 PROCEDURE — 80048 BASIC METABOLIC PNL TOTAL CA: CPT | Performed by: HOSPITALIST

## 2022-08-08 PROCEDURE — 250N000012 HC RX MED GY IP 250 OP 636 PS 637: Performed by: HOSPITALIST

## 2022-08-08 PROCEDURE — 85027 COMPLETE CBC AUTOMATED: CPT | Performed by: HOSPITALIST

## 2022-08-08 PROCEDURE — 96376 TX/PRO/DX INJ SAME DRUG ADON: CPT

## 2022-08-08 PROCEDURE — 99226 PR SUBSEQUENT OBSERVATION CARE,LEVEL III: CPT | Performed by: PHYSICIAN ASSISTANT

## 2022-08-08 PROCEDURE — 250N000011 HC RX IP 250 OP 636: Performed by: HOSPITALIST

## 2022-08-08 PROCEDURE — 250N000013 HC RX MED GY IP 250 OP 250 PS 637: Performed by: PHYSICIAN ASSISTANT

## 2022-08-08 RX ORDER — HYDROXYZINE HYDROCHLORIDE 25 MG/1
25 TABLET, FILM COATED ORAL EVERY 6 HOURS PRN
Status: DISCONTINUED | OUTPATIENT
Start: 2022-08-08 | End: 2022-08-10 | Stop reason: HOSPADM

## 2022-08-08 RX ORDER — SIMVASTATIN 20 MG
20 TABLET ORAL AT BEDTIME
Status: DISCONTINUED | OUTPATIENT
Start: 2022-08-08 | End: 2022-08-10 | Stop reason: HOSPADM

## 2022-08-08 RX ORDER — IPRATROPIUM BROMIDE AND ALBUTEROL SULFATE 2.5; .5 MG/3ML; MG/3ML
3 SOLUTION RESPIRATORY (INHALATION) EVERY 6 HOURS PRN
Status: DISCONTINUED | OUTPATIENT
Start: 2022-08-08 | End: 2022-08-09

## 2022-08-08 RX ORDER — CITALOPRAM HYDROBROMIDE 20 MG/1
40 TABLET ORAL AT BEDTIME
Status: DISCONTINUED | OUTPATIENT
Start: 2022-08-08 | End: 2022-08-10 | Stop reason: HOSPADM

## 2022-08-08 RX ORDER — LORAZEPAM 1 MG/1
1 TABLET ORAL DAILY PRN
Status: DISCONTINUED | OUTPATIENT
Start: 2022-08-08 | End: 2022-08-10 | Stop reason: HOSPADM

## 2022-08-08 RX ADMIN — AMPICILLIN SODIUM AND SULBACTAM SODIUM 3 G: 2; 1 INJECTION, POWDER, FOR SOLUTION INTRAMUSCULAR; INTRAVENOUS at 08:07

## 2022-08-08 RX ADMIN — AMPICILLIN SODIUM AND SULBACTAM SODIUM 3 G: 2; 1 INJECTION, POWDER, FOR SOLUTION INTRAMUSCULAR; INTRAVENOUS at 01:02

## 2022-08-08 RX ADMIN — CETIRIZINE HYDROCHLORIDE 10 MG: 10 TABLET, FILM COATED ORAL at 08:08

## 2022-08-08 RX ADMIN — AMPICILLIN SODIUM AND SULBACTAM SODIUM 3 G: 2; 1 INJECTION, POWDER, FOR SOLUTION INTRAMUSCULAR; INTRAVENOUS at 21:01

## 2022-08-08 RX ADMIN — IPRATROPIUM BROMIDE AND ALBUTEROL SULFATE 3 ML: .5; 3 SOLUTION RESPIRATORY (INHALATION) at 12:25

## 2022-08-08 RX ADMIN — GUAIFENESIN 600 MG: 600 TABLET, EXTENDED RELEASE ORAL at 21:01

## 2022-08-08 RX ADMIN — CITALOPRAM HYDROBROMIDE 40 MG: 20 TABLET ORAL at 21:01

## 2022-08-08 RX ADMIN — CETIRIZINE HYDROCHLORIDE 10 MG: 10 TABLET, FILM COATED ORAL at 21:00

## 2022-08-08 RX ADMIN — PREDNISONE 40 MG: 20 TABLET ORAL at 08:07

## 2022-08-08 RX ADMIN — SIMVASTATIN 20 MG: 20 TABLET, FILM COATED ORAL at 21:01

## 2022-08-08 RX ADMIN — GUAIFENESIN 600 MG: 600 TABLET, EXTENDED RELEASE ORAL at 08:08

## 2022-08-08 RX ADMIN — BENZONATATE 100 MG: 100 CAPSULE ORAL at 15:39

## 2022-08-08 RX ADMIN — LORAZEPAM 1 MG: 1 TABLET ORAL at 09:32

## 2022-08-08 RX ADMIN — AMPICILLIN SODIUM AND SULBACTAM SODIUM 3 G: 2; 1 INJECTION, POWDER, FOR SOLUTION INTRAMUSCULAR; INTRAVENOUS at 14:32

## 2022-08-08 NOTE — CONSULTS
Care Management Discharge Note    Discharge Date: 08/09/2022     Discharge Disposition:  Home with son    Discharge Services:  None anticipated    Discharge Transportation:  Family    Private pay costs discussed: Not applicable    Patient/Family in Agreement with the Plan:  Yes    Handoff Referral Completed: No    Additional Information:  SW consulted for discharge planning. SW spoke with bedside RN who reports that pt is worried about her home situation as family is moving in and her home is currently under construction.     SW met with patient and granddaughter Annika at bedside. Granddaughter reports that pt will be discharging to pt's son's home when medically stable. Pt's home is currently being renovated and the bathroom on the level she typically uses is under construction. The other bathroom is up a flight up stairs which pt has difficulty managing.     Pt reports that she was confused and didn't fully understand the discharge plans. Granddaughter reports that pt's home is being remodeled so granddaughter can move in and be pt's full time caregiver. At discharge, pt will temporarily staying at her sons home.     No SW needs identified. Pt to discharge home with family when medically stable. Family to transport.     BRAULIO Maravilla, [facility]   Inpatient Care Coordination  [facility]   [phone]

## 2022-08-08 NOTE — PLAN OF CARE
"PRIMARY DIAGNOSIS: Aspiration pneunmonia/cellulitis    OUTPATIENT/OBSERVATION GOALS TO BE MET BEFORE DISCHARGE  1. Orthostatic performed: N/A    2. Tolerating PO fluid and/or antibiotics (if applicable): Tolerating PO fluids. Has IV Unasyn q6    4. Pain status: Pain free.    5. Return to near baseline physical activity: No    Discharge Planner Nurse   Safe discharge environment identified: Yes  Barriers to discharge: No       Entered by: Armida Treviño RN 08/08/2022 5:32 AM  Pt AO x4. VSS on RA, LS clear, BS active. Pt up with SBA. Tolerating Regular diet. PIV SL, has unasyn abx q6 hours. Pt denied pain. Pt leg elevated throughout night. No PRN administered. Plan: will continue to monitor pt.   BP (!) 148/78 (BP Location: Right arm)   Pulse 75   Temp 98.2  F (36.8  C) (Oral)   Resp 18   Ht 1.626 m (5' 4.02\")   Wt 84.2 kg (185 lb 9.6 oz)   SpO2 91%   BMI 31.84 kg/m    Please review provider order for any additional goals.   Nurse to notify provider when observation goals have been met and patient is ready for discharge.  "

## 2022-08-08 NOTE — PLAN OF CARE
PRIMARY DIAGNOSIS: RLE Cellulitis/ Aspiration Pneumonia   OUTPATIENT/OBSERVATION GOALS TO BE MET BEFORE DISCHARGE:  Vitals sign stable or return to baseline: Yes    Tolerating oral antibiotics or has home infusion set up if applicable: IV unasyn every 6 hours    Pain status: Pain free.    Return to near baseline physical activity: No    Discharge Planner Nurse   Safe discharge environment identified: Yes  Barriers to discharge: Yes       Entered by: Mechelle Booth RN 08/07/2022 9:27 PM     Please review provider order for any additional goals.     Nurse to notify provider when observation goals have been met and patient is ready for discharge.    Temp: 98.1  F (36.7  C) Temp src: Oral BP: (!) 145/80 Pulse: 69   Resp: 18 SpO2: 94 % O2 Device: None (Room air)       A&O. Up SBA with cane. Hospital out of wedges for elevation, pt has RLE elevated on pillows. Denies pain/nausea. Complains of sob with activity and a frequent productive cough, provider notified and mucinex now scheduled 2x daily and prn tessalon given x1. Hives noticeable in BLE, abdomen and back- declined needing prn benadryl for itching. Plan- prednisone 40mg for 5 days, zyrtec increased to 10mg, prn benadryl for itching, unasyn every 6 hours, RLE elevated, per pt allergist appt next week.

## 2022-08-08 NOTE — PLAN OF CARE
PRIMARY DIAGNOSIS: PNEUMONIA  OUTPATIENT/OBSERVATION GOALS TO BE MET BEFORE DISCHARGE:  1. Dyspnea improved and O2 sats >88% on RA or back to baseline O2 levels: Yes   SpO2: 94 %, O2 Device: None (Room air)    2. Tolerating oral abx or appropriate plans made outpatient infusion: Yes    3. Vitals signs normal or return to baseline: Yes    4. Short term supplemental O2 needed with activity at home: na    5. Tolerate oral intake to maintain hydration: Yes    6. Return to near baseline physical activity: Yes    Discharge Planner Nurse   Safe discharge environment identified: Yes  Barriers to discharge: No       Entered by: Eliza Demarco RN 08/08/2022 5:26 PM     Please review provider order for any additional goals.   Nurse to notify provider when observation goals have been met and patient is ready for discharge.Goal Outcome Evaluation:

## 2022-08-08 NOTE — PLAN OF CARE
ROOM # 226    Living Situation (if not independent, order SW consult):  Facility name:  : Annika Todd daughter [phone], pt lives alone in her house    Activity level at baseline: Ind w/ cane  Activity level on admit: SBA w/ cane     Who will be transporting you at discharge: bryon darby    Patient registered to observation; given Patient Bill of Rights; given the opportunity to ask questions about observation status and their plan of care.  Patient has been oriented to the observation room, bathroom and call light is in place.    Discussed discharge goals and expectations with patient/family.

## 2022-08-08 NOTE — PROGRESS NOTES
[facility]    Medicine Progress Note - Hospitalist Service    Date of Admission:  8/7/2022    Assessment & Plan        Keisha Kolb is a 82 year old female with a past medical history of anxiety and hyperlipidemia who presents with skin lesions.    Patient was at a wedding in mid June when she started to notice raised, itchy skin lesions on her bilateral lower extremity and lower back.  Not painful. She did not have any associated fevers or chills. She did recently start Aricept in the days prior which has since been discontinued.  She denies any changes in detergents or known bug bites.  No changes in clothing.  She could not think of any other exposures at that time. She saw her primary care who prescribed her a prednisone course and cetirizine.  Her symptoms did improve but then have recurred intermittently.      She was admitted on 8/7/2022 for recurrence of her lesions along with swelling and warmth of her right lower extremity.  She denies any chest pain. She has had some shortness of breath over the last few days but no wheezing.  No nausea vomiting. No changes in her voice.  No known allergies.    In ER, patient afebrile and hemodynamically stable.  CBC without any leukocytosis. BMP unremarkable.  D-dimer was positive. CT PE was negative for PE but did show possible aspiration pneumonia.  Lower extremity ultrasound negative for DVT.  Patient was given Methylpred, ceftriaxone, azithromycin.    #Urticarial skin lesions with likely superimposed cellulitis of RLE  - suspect RLE cellulitis from extensive itching from urticarial skin lesions  - ultrasound of LE negative for DVT  - does not appear septic   - upcoming outpatient appointment with allergist about urticarial skin lesions  - initiated on PO Prednisone 40 mg daily for 5 days, should compete on 8/12  - continue PTA Cetirizine 10 mg BID  - will avoid Benadryl due to making patient extremely drowsy, will have PRN Atarax  "available for itching  - continue IV Unasyn, improvement to cellulitis on 8/8, likely able to transition to PO antibiotics in AM  - elevated RLE    #Possible aspiration PNA: reports of shortness of breath and cough, no wheezing or stridor. CT chest negative for PE but did show possible aspiration. Denies nausea, vomiting, or dysphagia.   - IV Unasyn as above for cover for aspiration   - afebrile and no hypoxia  - PRN duo nebs due to increased shortness of breath this morning     #Anxiety: tearful this morning and concern about discharging home which is causing her increased anxiety  - continue PTA Ativan and Citalopram     #HLD: continue Simvastatin       Diet: Regular Diet Adult    DVT Prophylaxis: Pneumatic Compression Devices  Izaguirre Catheter: Not present  Central Lines: None  Cardiac Monitoring: None  Code Status: No CPR- Pre-arrest intubation OK      Disposition Plan      Expected Discharge Date: 08/09/2022    Discharge Delays: IV Medication - consider oral or Home Infusion            The patient's care was discussed with the Attending Physician, Dr. Villanueva, Bedside Nurse, Care Coordinator/, Patient and Patient's Family.    Whitney Gudino PA-C  Hospitalist Service  [facility]  Securely message with the Vocera Web Console (learn more here)  Text page via [employer] Paging/Directory         Clinically Significant Risk Factors Present on Admission                    # Obesity: Estimated body mass index is 31.84 kg/m  as calculated from the following:    Height as of this encounter: 1.626 m (5' 4.02\").    Weight as of this encounter: 84.2 kg (185 lb 9.6 oz).      ______________________________________________________________________    Interval History   Patient reports ongoing erythema and swelling that has improved.  She reports feeling short of breath and with a cough.  Patient is tearful about the thought of discharging home too soon.  Patient denies chest pain, numbness or " tingling, nausea, or vomiting    Patient's daughter at bedside and updated on current plan of care with all questions answered.    Data reviewed today: I reviewed all medications, new labs and imaging results over the last 24 hours.    Physical Exam   Vital Signs: Temp: 98  F (36.7  C) Temp src: Oral BP: (!) 143/63 Pulse: 85   Resp: 18 SpO2: 93 % O2 Device: None (Room air)    Weight: 185 lbs 9.6 oz  Constitutional: NAD, Nontoxic.  Conversational.  Pleasant  HEENT: Normocephalic, MMM, no elevation of JVD noted  Respiratory: Nl WOB, Clear bilaterally, No wheezes, no crackles.  No stridor.  Conversational.  Cardiovascular: RRR, no murmur  GI: BS+, NT, ND, no rebound or guarding  Skin: raised, pruritic lesions most significant in bilateral lower extremities and excoriations of right LE. Mild lesions on both arms. Mild warm and swelling to RLE.  Musculoskeletal: normal tone  Neurologic: A&Ox3, Answers appropriately. CNII-XII intact. Moves all extremities. No tremor  Psychiatric: intermittently tearful     Data   Results for orders placed or performed during the hospital encounter of 08/07/22   US Lower Extremity Venous Duplex Bilateral     Status: None    Narrative    EXAM: US LOWER EXTREMITY VENOUS DUPLEX BILATERAL  LOCATION: [facility]  DATE/TIME: 8/7/2022 1:39 PM    INDICATION: swelling, pain  COMPARISON: None.  TECHNIQUE: Venous Duplex ultrasound of bilateral lower extremities with and without compression, augmentation and duplex. Color flow and spectral Doppler with waveform analysis performed.    FINDINGS: Exam includes the common femoral, femoral, popliteal veins as well as segmentally visualized deep calf veins and greater saphenous vein.     RIGHT: No deep vein thrombosis. No superficial thrombophlebitis. No popliteal cyst.    LEFT: No deep vein thrombosis. No superficial thrombophlebitis. No popliteal cyst.      Impression    IMPRESSION:  1.  No deep venous thrombosis in the bilateral  lower extremities.   CT Chest Pulmonary Embolism w Contrast     Status: None    Narrative    EXAM: CT CHEST PULMONARY EMBOLISM W CONTRAST  LOCATION: [facility] [place]  DATE/TIME: 8/7/2022 3:45 PM    INDICATION: Dyspnea. Positive d-dimer.  COMPARISON: None.  TECHNIQUE: CT chest pulmonary angiogram during arterial phase injection of IV contrast. Multiplanar reformats and MIP reconstructions were performed. Dose reduction techniques were used.   CONTRAST: 65mL Isovue 370.    FINDINGS:  ANGIOGRAM CHEST: Mild motion. No pulmonary embolism. Nonaneurysmal aorta without dissection. Mild to moderate atherosclerosis.    LUNGS AND PLEURA: Moderate right greater than left lower lobe predominant endobronchial contents. Mild bibasilar volume loss and atelectasis. No pleural effusion or pneumothorax.    MEDIASTINUM/AXILLAE: Few borderline enlarged nodes are presumed reactive. Upper normal heart size. No pericardial effusion. Small hiatus hernia.    CORONARY ARTERY CALCIFICATION: Compromised by motion.    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     MUSCULOSKELETAL: Bony demineralization and degenerative changes. Left mastectomy.      Impression    IMPRESSION:    1.  No pulmonary embolism.    2.  Nonaneurysmal aorta without dissection.    3.  Moderate lower lobe predominant airway debris, raising concern for aspiration.    4.  Mild airway thickening / bronchitis. Probable mild bronchiectasis.       Basic metabolic panel (BMP)     Status: Abnormal   Result Value Ref Range    Creatinine 0.85 0.51 - 0.95 mg/dL    Sodium 137 136 - 145 mmol/L    Potassium 4.5 3.4 - 5.3 mmol/L    Urea Nitrogen 14.1 8.0 - 23.0 mg/dL    Chloride 103 98 - 107 mmol/L    Carbon Dioxide (CO2) 29 22 - 29 mmol/L    Anion Gap 5 (L) 7 - 15 mmol/L    Glucose 99 70 - 99 mg/dL    GFR Estimate 68  >60 mL/min/1.73m2    Calcium 8.8 8.8 - 10.2 mg/dL   CBC with platelets and differential     Status: Abnormal   Result Value Ref Range    WBC Count 7.1 4.0 - 11.0 10e3/uL    RBC Count 4.67 3.80 - 5.20 10e6/uL    Hemoglobin 13.9 11.7 - 15.7 g/dL    Hematocrit 45.1 35.0 - 47.0 %    MCV 97 78 - 100 fL    MCH 29.8 26.5 - 33.0 pg    MCHC 30.8 (L) 31.5 - 36.5 g/dL    RDW 13.0 10.0 - 15.0 %    Platelet Count 197 150 - 450 10e3/uL    % Neutrophils 73 %    % Lymphocytes 16 %    % Monocytes 7 %    % Eosinophils 4 %    % Basophils 0 %    % Immature Granulocytes 0 %    NRBCs per 100 WBC 0 <1 /100    Absolute Neutrophils 5.2 1.6 - 8.3 10e3/uL    Absolute Lymphocytes 1.2 0.8 - 5.3 10e3/uL    Absolute Monocytes 0.5 0.0 - 1.3 10e3/uL    Absolute Eosinophils 0.3 0.0 - 0.7 10e3/uL    Absolute Basophils 0.0 0.0 - 0.2 10e3/uL    Absolute Immature Granulocytes 0.0 <=0.4 10e3/uL    Absolute NRBCs 0.0 10e3/uL   Symptomatic; Yes; 7/27/2022 Influenza A/B & SARS-CoV2 (COVID-19) Virus PCR Multiplex Nose     Status: Normal    Specimen: Nose; Swab   Result Value Ref Range    Influenza A PCR Negative Negative    Influenza B PCR Negative Negative    RSV PCR Negative Negative    SARS CoV2 PCR Negative Negative    Narrative    Testing was performed using the Xpert Xpress CoV2/Flu/RSV Assay on the Cepheid GeneXpert Instrument. This test should be ordered for the detection of SARS-CoV-2 and influenza viruses in individuals who meet clinical and/or epidemiological criteria. Test performance is unknown in asymptomatic patients. This test is for in vitro diagnostic use under the FDA EUA for laboratories certified under CLIA to perform high or moderate complexity testing. This test has not been FDA cleared or approved. A negative result does not rule out the presence of PCR inhibitors in the specimen or target RNA in concentration below the limit of detection for the assay. If only one viral target is positive but coinfection with multiple targets is  suspected, the sample should be re-tested with another FDA cleared, approved, or authorized test, if coinfection would change clinical management. This test was validated by the [facility]. These laboratories are certified under the Clinical  Laboratory Improvement Amendments of 1988 (CLIA-88) as qualified to perform high complexity laboratory testing.   D dimer quantitative     Status: Abnormal   Result Value Ref Range    D-Dimer Quantitative 1.49 (H) 0.00 - 0.50 ug/mL FEU    Narrative    This D-dimer assay is intended for use in conjunction with a clinical pretest probability assessment model to exclude pulmonary embolism (PE) and deep venous thrombosis (DVT) in outpatients suspected of PE or DVT. The cut-off value is 0.50 ug/mL FEU.   Basic metabolic panel     Status: Abnormal   Result Value Ref Range    Creatinine 0.75 0.51 - 0.95 mg/dL    Sodium 140 136 - 145 mmol/L    Potassium 4.6 3.4 - 5.3 mmol/L    Urea Nitrogen 13.1 8.0 - 23.0 mg/dL    Chloride 105 98 - 107 mmol/L    Carbon Dioxide (CO2) 25 22 - 29 mmol/L    Anion Gap 10 7 - 15 mmol/L    Glucose 182 (H) 70 - 99 mg/dL    GFR Estimate 79 >60 mL/min/1.73m2    Calcium 8.7 (L) 8.8 - 10.2 mg/dL   CBC with platelets     Status: Abnormal   Result Value Ref Range    WBC Count 8.6 4.0 - 11.0 10e3/uL    RBC Count 4.42 3.80 - 5.20 10e6/uL    Hemoglobin 13.1 11.7 - 15.7 g/dL    Hematocrit 41.7 35.0 - 47.0 %    MCV 94 78 - 100 fL    MCH 29.6 26.5 - 33.0 pg    MCHC 31.4 (L) 31.5 - 36.5 g/dL    RDW 12.5 10.0 - 15.0 %    Platelet Count 198 150 - 450 10e3/uL   EKG 12-lead, tracing only     Status: None   Result Value Ref Range    Systolic Blood Pressure  mmHg    Diastolic Blood Pressure  mmHg    Ventricular Rate 68 BPM    Atrial Rate 68 BPM    KY Interval 148 ms    QRS Duration 116 ms     ms    QTc 457 ms    P Axis 37 degrees    R AXIS 68 degrees    T Axis 25 degrees    Interpretation ECG       Sinus rhythm  Incomplete right bundle branch  block  Borderline ECG  No previous ECGs available  Confirmed by - EMERGENCY ROOM, PHYSICIAN ([id number]),  MICHELLE VILLALTA ([id number]) on 8/8/2022 6:42:10 AM     CBC + differential     Status: Abnormal    Narrative    The following orders were created for panel order CBC + differential.  Procedure                               Abnormality         Status                     ---------                               -----------         ------                     CBC with platelets and d...[[id number]]  Abnormal            Final result                 Please view results for these tests on the individual orders.

## 2022-08-08 NOTE — PLAN OF CARE
PRIMARY DIAGNOSIS: PNEUMONIA  OUTPATIENT/OBSERVATION GOALS TO BE MET BEFORE DISCHARGE:  1. Dyspnea improved and O2 sats >88% on RA or back to baseline O2 levels: Yes   SpO2: 94 %, O2 Device: None (Room air)    2. Tolerating oral abx or appropriate plans made outpatient infusion: Yes    3. Vitals signs normal or return to baseline: Yes    4. Short term supplemental O2 needed with activity at home: na    5. Tolerate oral intake to maintain hydration: Yes    6. Return to near baseline physical activity: Yes    Discharge Planner Nurse   Safe discharge environment identified: Yes  Barriers to discharge: No       Entered by: Eliza Demarco RN 08/08/2022 3:07 PM     Please review provider order for any additional goals.   Nurse to notify provider when observation goals have been met and patient is ready for discharge.Goal Outcome Evaluation:

## 2022-08-08 NOTE — PLAN OF CARE
"PRIMARY DIAGNOSIS: Aspiration pneunmonia/cellulitis    OUTPATIENT/OBSERVATION GOALS TO BE MET BEFORE DISCHARGE  1. Orthostatic performed: N/A    2. Tolerating PO fluid and/or antibiotics (if applicable): Tolerating PO fluids. Has IV Unasyn q6    4. Pain status: Pain free.    5. Return to near baseline physical activity: No    Discharge Planner Nurse   Safe discharge environment identified: Yes  Barriers to discharge: No       Entered by: Armida Treviño RN 08/08/2022 4:37 AM  Pt AO x4. VSS on RA, LS clear, BS active. Pt up with SBA. Tolerating Regular diet. PIV SL, has unasyn abx q6 hours. Pt denied pain. Pt leg elevated throughout night. No PRN administered.   BP (!) 148/78 (BP Location: Right arm)   Pulse 75   Temp 98.2  F (36.8  C) (Oral)   Resp 18   Ht 1.626 m (5' 4.02\")   Wt 84.2 kg (185 lb 9.6 oz)   SpO2 91%   BMI 31.84 kg/m    Please review provider order for any additional goals.   Nurse to notify provider when observation goals have been met and patient is ready for discharge.  "

## 2022-08-09 LAB
ANION GAP SERPL CALCULATED.3IONS-SCNC: 8 MMOL/L (ref 7–15)
BUN SERPL-MCNC: 18.9 MG/DL (ref 8–23)
CALCIUM SERPL-MCNC: 8.5 MG/DL (ref 8.8–10.2)
CHLORIDE SERPL-SCNC: 105 MMOL/L (ref 98–107)
CREAT SERPL-MCNC: 0.82 MG/DL (ref 0.51–0.95)
DEPRECATED HCO3 PLAS-SCNC: 26 MMOL/L (ref 22–29)
GFR SERPL CREATININE-BSD FRML MDRD: 71 ML/MIN/1.73M2
GLUCOSE SERPL-MCNC: 110 MG/DL (ref 70–99)
POTASSIUM SERPL-SCNC: 4.2 MMOL/L (ref 3.4–5.3)
PROCALCITONIN SERPL IA-MCNC: 0.03 NG/ML
SODIUM SERPL-SCNC: 139 MMOL/L (ref 136–145)

## 2022-08-09 PROCEDURE — 36415 COLL VENOUS BLD VENIPUNCTURE: CPT | Performed by: PHYSICIAN ASSISTANT

## 2022-08-09 PROCEDURE — 250N000011 HC RX IP 250 OP 636: Performed by: HOSPITALIST

## 2022-08-09 PROCEDURE — 250N000012 HC RX MED GY IP 250 OP 636 PS 637: Performed by: HOSPITALIST

## 2022-08-09 PROCEDURE — 999N000127 HC STATISTIC PERIPHERAL IV START W US GUIDANCE

## 2022-08-09 PROCEDURE — 96376 TX/PRO/DX INJ SAME DRUG ADON: CPT

## 2022-08-09 PROCEDURE — G0378 HOSPITAL OBSERVATION PER HR: HCPCS

## 2022-08-09 PROCEDURE — 250N000009 HC RX 250: Performed by: PHYSICIAN ASSISTANT

## 2022-08-09 PROCEDURE — 250N000013 HC RX MED GY IP 250 OP 250 PS 637: Performed by: HOSPITALIST

## 2022-08-09 PROCEDURE — 120N000001 HC R&B MED SURG/OB

## 2022-08-09 PROCEDURE — 99233 SBSQ HOSP IP/OBS HIGH 50: CPT | Performed by: PHYSICIAN ASSISTANT

## 2022-08-09 PROCEDURE — 80048 BASIC METABOLIC PNL TOTAL CA: CPT | Performed by: PHYSICIAN ASSISTANT

## 2022-08-09 PROCEDURE — 84145 PROCALCITONIN (PCT): CPT | Performed by: PHYSICIAN ASSISTANT

## 2022-08-09 PROCEDURE — 250N000013 HC RX MED GY IP 250 OP 250 PS 637: Performed by: PHYSICIAN ASSISTANT

## 2022-08-09 RX ORDER — IPRATROPIUM BROMIDE AND ALBUTEROL SULFATE 2.5; .5 MG/3ML; MG/3ML
3 SOLUTION RESPIRATORY (INHALATION)
Status: DISCONTINUED | OUTPATIENT
Start: 2022-08-09 | End: 2022-08-09

## 2022-08-09 RX ORDER — ALBUTEROL SULFATE 0.83 MG/ML
2.5 SOLUTION RESPIRATORY (INHALATION) EVERY 6 HOURS PRN
Status: DISCONTINUED | OUTPATIENT
Start: 2022-08-09 | End: 2022-08-10 | Stop reason: HOSPADM

## 2022-08-09 RX ORDER — GUAIFENESIN/DEXTROMETHORPHAN 100-10MG/5
10 SYRUP ORAL EVERY 4 HOURS PRN
Status: DISCONTINUED | OUTPATIENT
Start: 2022-08-09 | End: 2022-08-10 | Stop reason: HOSPADM

## 2022-08-09 RX ADMIN — AMPICILLIN SODIUM AND SULBACTAM SODIUM 3 G: 2; 1 INJECTION, POWDER, FOR SOLUTION INTRAMUSCULAR; INTRAVENOUS at 20:25

## 2022-08-09 RX ADMIN — SIMVASTATIN 20 MG: 20 TABLET, FILM COATED ORAL at 21:52

## 2022-08-09 RX ADMIN — IPRATROPIUM BROMIDE AND ALBUTEROL SULFATE 3 ML: .5; 3 SOLUTION RESPIRATORY (INHALATION) at 10:16

## 2022-08-09 RX ADMIN — GUAIFENESIN AND DEXTROMETHORPHAN 10 ML: 100; 10 SYRUP ORAL at 15:20

## 2022-08-09 RX ADMIN — AMPICILLIN SODIUM AND SULBACTAM SODIUM 3 G: 2; 1 INJECTION, POWDER, FOR SOLUTION INTRAMUSCULAR; INTRAVENOUS at 08:18

## 2022-08-09 RX ADMIN — GUAIFENESIN AND DEXTROMETHORPHAN 10 ML: 100; 10 SYRUP ORAL at 21:52

## 2022-08-09 RX ADMIN — GUAIFENESIN AND DEXTROMETHORPHAN 10 ML: 100; 10 SYRUP ORAL at 10:16

## 2022-08-09 RX ADMIN — CETIRIZINE HYDROCHLORIDE 10 MG: 10 TABLET, FILM COATED ORAL at 08:22

## 2022-08-09 RX ADMIN — AMPICILLIN SODIUM AND SULBACTAM SODIUM 3 G: 2; 1 INJECTION, POWDER, FOR SOLUTION INTRAMUSCULAR; INTRAVENOUS at 15:20

## 2022-08-09 RX ADMIN — CETIRIZINE HYDROCHLORIDE 10 MG: 10 TABLET, FILM COATED ORAL at 20:26

## 2022-08-09 RX ADMIN — GUAIFENESIN 600 MG: 600 TABLET, EXTENDED RELEASE ORAL at 08:22

## 2022-08-09 RX ADMIN — CITALOPRAM HYDROBROMIDE 40 MG: 20 TABLET ORAL at 21:52

## 2022-08-09 RX ADMIN — HYDROXYZINE HYDROCHLORIDE 25 MG: 25 TABLET, FILM COATED ORAL at 00:03

## 2022-08-09 RX ADMIN — AMPICILLIN SODIUM AND SULBACTAM SODIUM 3 G: 2; 1 INJECTION, POWDER, FOR SOLUTION INTRAMUSCULAR; INTRAVENOUS at 02:15

## 2022-08-09 RX ADMIN — PREDNISONE 40 MG: 20 TABLET ORAL at 08:22

## 2022-08-09 ASSESSMENT — ACTIVITIES OF DAILY LIVING (ADL)
ADLS_ACUITY_SCORE: 36
ADLS_ACUITY_SCORE: 38
ADLS_ACUITY_SCORE: 36
ADLS_ACUITY_SCORE: 38
ADLS_ACUITY_SCORE: 36
ADLS_ACUITY_SCORE: 36

## 2022-08-09 NOTE — PLAN OF CARE
"PRIMARY DIAGNOSIS: Aspiration pneunmonia/cellulitis    OUTPATIENT/OBSERVATION GOALS TO BE MET BEFORE DISCHARGE  1. Orthostatic performed: N/A    2. Tolerating PO fluid and/or antibiotics (if applicable): Tolerating PO fluids. Has IV Unasyn q6    4. Pain status: Pain free.    5. Return to near baseline physical activity: No    Discharge Planner Nurse   Safe discharge environment identified: Yes  Barriers to discharge: No       Entered by: Armida Treviño RN 08/09/2022 9:05 PM    Pt AO x4. VSS on RA, LS clear, BS active. Pt up with SBA. Tolerating Regular diet. PIV SL, has unasyn abx q6 hours. Pt denied pain. No PRN administered.   /76 (BP Location: Right arm)   Pulse 65   Temp 98.5  F (36.9  C) (Oral)   Resp 16   Ht 1.626 m (5' 4.02\")   Wt 84.2 kg (185 lb 9.6 oz)   SpO2 93%   BMI 31.84 kg/m    Please review provider order for any additional goals.   Nurse to notify provider when observation goals have been met and patient is ready for discharge.  "

## 2022-08-09 NOTE — PLAN OF CARE
PRIMARY DIAGNOSIS: SOFT TISSUE INFECTION  OUTPATIENT/OBSERVATION GOALS TO BE MET BEFORE DISCHARGE:  1. Vitals sign stable or return to baseline: Yes    2. Tolerating oral antibiotics or has home infusion set up if applicable: No    3. Pain status: Pain free.    4. Return to near baseline physical activity: Yes    Discharge Planner Nurse   Safe discharge environment identified: Yes  Barriers to discharge: Yes       Entered by: Rema Juarez RN 08/09/2022 3:31 PM     Please review provider order for any additional goals.     Nurse to notify provider when observation goals have been met and patient is ready for discharge.    A&Ox4, SBA- refuses cane, tolerating regular diet, heart sounds- WNL, frequent cough, audible wheezes, robitussin and nebs, bowels- active, voiding without difficulty, denies itching, bilateral extremity skin lesions, redness and edema.

## 2022-08-09 NOTE — PLAN OF CARE
PRIMARY DIAGNOSIS: SOFT TISSUE INFECTIONS  OUTPATIENT/OBSERVATION GOALS TO BE MET BEFORE DISCHARGE:  Vitals sign stable or return to baseline: Yes    Tolerating oral antibiotics or has home infusion set up if applicable: No    Pain status: Pain free.    Return to near baseline physical activity: Yes    Discharge Planner Nurse   Safe discharge environment identified: Yes  Barriers to discharge: Yes       Entered by: Rema Juarez RN 08/09/2022 11:46 AM     Please review provider order for any additional goals.     Nurse to notify provider when observation goals have been met and patient is ready for discharge.

## 2022-08-09 NOTE — PLAN OF CARE
Goal Outcome Evaluation:    Pt A/Ox4, forgetful at times. VSS on RA. Denies pain at this time, reporting itching in LE, given atarax x1 with little relief per pt. Intermittent abx, unacyn given q6h. PIV SL. Up SBA, ambulating to BR and voiding appropriately. CTM and provide supportive cares.    Vital signs:  Temp: 98.4  F (36.9  C) Temp src: Oral BP: (!) 121/94 Pulse: 75   Resp: 16 SpO2: 93 % O2 Device: None (Room air)

## 2022-08-09 NOTE — UTILIZATION REVIEW
Admission Status; Secondary Review Determination    Under the authority of the Utilization Management Committee, the utilization review process indicated a secondary review on the above patient. The review outcome is based on review of the medical records, discussions with staff, and applying clinical experience noted on the date of the review.    (x) Inpatient Status Appropriate - This patient's medical care is consistent with medical management for inpatient care and reasonable inpatient medical practice.    RATIONALE FOR DETERMINATION: 82-year-old female with history of anxiety, cognitive impairment who developed significant urticaria in mid June with marked pruritus that has been recurrent during the summer.  Patient has scratched a lot of the lesions and now presents with superimposed right lower extremity cellulitis.  CT imaging also revealed findings concerning for aspiration pneumonia.  On hospital day 3 patient has significant cough, dyspnea as well as bronchospasm concerning for exacerbation of patient's aspiration pneumonia.  Due to both infections, inpatient management appropriate.    At the time of admission with the information available to the attending physician more than 2 nights Hospital complex care was anticipated, based on patient risk of adverse outcome if treated as outpatient and complex care required. Inpatient admission is appropriate based on the Medicare guidelines.    This document was produced using voice recognition software    The information on this document is developed by the utilization review team in order for the business office to ensure compliance. This only denotes the appropriateness of proper admission status and does not reflect the quality of care rendered.    The definitions of Inpatient Status and Observation Status used in making the determination above are those provided in the CMS Coverage Manual, Chapter 1 and Chapter 6, section 70.4.    Sincerely,    John  MD Luc  Utilization Review  Physician Advisor  [facility].

## 2022-08-09 NOTE — PLAN OF CARE
PRIMARY DIAGNOSIS: SOFT TISSUE INFECTION  OUTPATIENT/OBSERVATION GOALS TO BE MET BEFORE DISCHARGE:  1. Vitals sign stable or return to baseline: Yes    2. Tolerating oral antibiotics or has home infusion set up if applicable: No    3. Pain status: Pain free.    4. Return to near baseline physical activity: Yes    Discharge Planner Nurse   Safe discharge environment identified: Yes  Barriers to discharge: Yes       Entered by: Rema Juarez RN 08/09/2022 6:03 PM     Please review provider order for any additional goals.     Nurse to notify provider when observation goals have been met and patient is ready for discharge.    A&Ox4, SBA- refuses cane, tolerating regular diet, heart sounds- WNL, frequent cough, audible wheezes, robitussin and nebs prn, bowels- active, voiding without difficulty, denies itching, bilateral extremity skin lesions, redness and edema.

## 2022-08-09 NOTE — PLAN OF CARE
Goal Outcome Evaluation:    Pt A/Ox4, forgetful at times. Denies pain at this time, reporting itching in LE; no meds available at this time, offered alternative interventions, pt refused. PIV SL. Up SBA, ambulates to BR without difficulty, voiding appropriately. Potential discharge today to home with daughter, pending improvement in itching and stable condition.    Vital signs:  Temp: 98.4  F (36.9  C) Temp src: Oral BP: (!) 121/94 Pulse: 75   Resp: 16 SpO2: 93 % O2 Device: None (Room air)

## 2022-08-09 NOTE — PROGRESS NOTES
[facility]    Hospitalist Progress Note      Assessment & Plan   Keisha Kolb is a 82 year old female with a past medical history of anxiety and hyperlipidemia who presents with skin lesions.     Patient was at a wedding in mid June when she started to notice raised, itchy skin lesions on her bilateral lower extremity and lower back.  Not painful. She did not have any associated fevers or chills. She did recently start Aricept in the days prior which has since been discontinued.  She denies any changes in detergents or known bug bites.  No changes in clothing.  She could not think of any other exposures at that time. She saw her primary care who prescribed her a prednisone course and cetirizine.  Her symptoms did improve but then have recurred intermittently.       She was admitted on 8/7/2022 for recurrence of her lesions along with swelling and warmth of her right lower extremity.  She denies any chest pain. She has had some shortness of breath over the last few days but no wheezing.  No nausea vomiting. No changes in her voice.  No known allergies.     In ER, patient afebrile and hemodynamically stable.  CBC without any leukocytosis. BMP unremarkable.  D-dimer was positive. CT PE was negative for PE but did show possible aspiration pneumonia.  Lower extremity ultrasound negative for DVT.  Patient was given Methylpred, ceftriaxone, azithromycin.     #Urticarial skin lesions with likely superimposed cellulitis of RLE  - suspect RLE cellulitis from extensive itching from urticarial skin lesions  - ultrasound of LE negative for DVT  - does not appear septic   - upcoming outpatient appointment with allergist about urticarial skin lesions  - initiated on PO Prednisone 40 mg daily for 5 days, should compete on 8/12  - continue PTA Cetirizine 10 mg BID  - will avoid Benadryl due to making patient extremely drowsy, will have PRN Atarax available for itching  - continue IV Unasyn, likely able  to transition to PO antibiotics in AM   - elevated RLE  - follow up with dermatology     #Acute Bronchospasm, Possible Aspiration Pneumonia  Cough, dyspnea. Noted on admission. Exacerbated today and mild dyspnea at rest. No acute respiratory distress or known aspiration event.   Imaging on admit Moderate right greater than left lower lobe predominant endobronchial contents. Mild bibasilar volume loss and atelectasis.  - procal  - cbc in AM  - supportive cares  - Unasyn for now as above  - monitor SpO2  - duoneb PRN For associated bronchospasm     #Anxiety: exacerbated with setting and concern about discharge.   - continue PTA Ativan and Citalopram      #HLD: continue Simvastatin        DVT Prophylaxis: Enoxaparin (Lovenox) subcutaneous if not discharging today.    Dispo: currently obs. Review with UR again if remaining admitted for PNA whether qualify IP> likely able to discharge home tomorrow if respiratory symptoms improved.   Code Status: No CPR- Pre-arrest intubation SAUMYA Solorio PA-C      Interval History   Itching overnight, atarax given with improvement.   This AM short of breath at rest. Frequent cough. Non productive. No fever, ab pain, vomiting. No CP.  Denies any dysphagia or aspiration events.  Ongoing erythema in the right lower extremity and swelling is the same as yesterday.  No pain.    -Data reviewed today: I reviewed all new labs and imaging results over the last 24 hours.    Physical Exam   Temp: 97.8  F (36.6  C) Temp src: Oral BP: (!) 161/80 Pulse: 68   Resp: 16 SpO2: 96 % O2 Device: None (Room air)    Vitals:    08/07/22 1113 08/07/22 1843   Weight: 84.5 kg (186 lb 4.6 oz) 84.2 kg (185 lb 9.6 oz)     Vital Signs with Ranges  Temp:  [97.8  F (36.6  C)-99  F (37.2  C)] 97.8  F (36.6  C)  Pulse:  [58-85] 68  Resp:  [16-18] 16  BP: (109-175)/(63-94) 161/80  SpO2:  [91 %-96 %] 96 %  I/O last 3 completed shifts:  In: 240 [P.O.:240]  Out: -       ConstitutionalAwake, alert, no apparent  distress  Respiratory: Mildly tachypneic with transmitted upper respiratory noises.  Slight crackles Right lung base. No wheezing.   Cardiovascular: Regular rate and rhythm, normal S1 and S2, and no murmur appreciated.   GI: Normal bowel sounds, soft, non-distended, non-tender.   Skin: raised, pruritic lesions most significant in bilateral lower extremities and excoriations of right LE. Mild lesions on both arms. Mild warm and swelling to RLE.  Neuro: Alert & oriented x4. Speech is clear. Moving all extremities without lateralizing weakness.  Coordination and sensation grossly intact.   Psych: Appropriate affect.      Medications       ampicillin-sulbactam (UNASYN) IV  3 g Intravenous Q6H     cetirizine  10 mg Oral BID     citalopram  40 mg Oral At Bedtime     guaiFENesin  600 mg Oral BID     predniSONE  40 mg Oral Daily     simvastatin  20 mg Oral At Bedtime       Data   Recent Labs   Lab 08/09/22  0654 08/08/22  0705 08/07/22  1306   WBC  --  8.6 7.1   HGB  --  13.1 13.9   MCV  --  94 97   PLT  --  198 197    140 137   POTASSIUM 4.2 4.6 4.5   CHLORIDE 105 105 103   CO2 26 25 29   BUN 18.9 13.1 14.1   CR 0.82 0.75 0.85   ANIONGAP 8 10 5*   KRISTEL 8.5* 8.7* 8.8   * 182* 99       No results found for this or any previous visit (from the past 24 hour(s)).

## 2022-08-10 VITALS
DIASTOLIC BLOOD PRESSURE: 53 MMHG | RESPIRATION RATE: 18 BRPM | OXYGEN SATURATION: 92 % | HEIGHT: 64 IN | SYSTOLIC BLOOD PRESSURE: 130 MMHG | WEIGHT: 185.6 LBS | TEMPERATURE: 97.8 F | HEART RATE: 72 BPM | BODY MASS INDEX: 31.69 KG/M2

## 2022-08-10 LAB
ERYTHROCYTE [DISTWIDTH] IN BLOOD BY AUTOMATED COUNT: 13 % (ref 10–15)
HCT VFR BLD AUTO: 40.5 % (ref 35–47)
HGB BLD-MCNC: 12.5 G/DL (ref 11.7–15.7)
MCH RBC QN AUTO: 29.8 PG (ref 26.5–33)
MCHC RBC AUTO-ENTMCNC: 30.9 G/DL (ref 31.5–36.5)
MCV RBC AUTO: 96 FL (ref 78–100)
PLATELET # BLD AUTO: 180 10E3/UL (ref 150–450)
RBC # BLD AUTO: 4.2 10E6/UL (ref 3.8–5.2)
WBC # BLD AUTO: 11.7 10E3/UL (ref 4–11)

## 2022-08-10 PROCEDURE — 99239 HOSP IP/OBS DSCHRG MGMT >30: CPT | Performed by: PHYSICIAN ASSISTANT

## 2022-08-10 PROCEDURE — 250N000011 HC RX IP 250 OP 636: Performed by: HOSPITALIST

## 2022-08-10 PROCEDURE — 250N000012 HC RX MED GY IP 250 OP 636 PS 637: Performed by: HOSPITALIST

## 2022-08-10 PROCEDURE — 36415 COLL VENOUS BLD VENIPUNCTURE: CPT | Performed by: PHYSICIAN ASSISTANT

## 2022-08-10 PROCEDURE — 250N000013 HC RX MED GY IP 250 OP 250 PS 637: Performed by: PHYSICIAN ASSISTANT

## 2022-08-10 PROCEDURE — 85027 COMPLETE CBC AUTOMATED: CPT | Performed by: PHYSICIAN ASSISTANT

## 2022-08-10 PROCEDURE — 250N000013 HC RX MED GY IP 250 OP 250 PS 637: Performed by: HOSPITALIST

## 2022-08-10 RX ORDER — GUAIFENESIN/DEXTROMETHORPHAN 100-10MG/5
10 SYRUP ORAL EVERY 4 HOURS PRN
Status: ON HOLD
Start: 2022-08-10 | End: 2022-10-26

## 2022-08-10 RX ORDER — ACETAMINOPHEN 325 MG/1
650 TABLET ORAL EVERY 6 HOURS PRN
Start: 2022-08-10

## 2022-08-10 RX ORDER — HYDROXYZINE HYDROCHLORIDE 25 MG/1
25 TABLET, FILM COATED ORAL EVERY 6 HOURS PRN
Qty: 32 TABLET | Refills: 0 | Status: SHIPPED | OUTPATIENT
Start: 2022-08-10 | End: 2022-09-11

## 2022-08-10 RX ORDER — PREDNISONE 20 MG/1
40 TABLET ORAL DAILY
Qty: 2 TABLET | Refills: 0 | Status: SHIPPED | OUTPATIENT
Start: 2022-08-11 | End: 2022-08-12

## 2022-08-10 RX ORDER — TRIAMCINOLONE ACETONIDE 1 MG/G
CREAM TOPICAL 2 TIMES DAILY
Qty: 28.4 G | Refills: 0 | Status: SHIPPED | OUTPATIENT
Start: 2022-08-10 | End: 2022-09-11

## 2022-08-10 RX ORDER — TRIAMCINOLONE ACETONIDE 1 MG/G
CREAM TOPICAL 2 TIMES DAILY
Status: DISCONTINUED | OUTPATIENT
Start: 2022-08-10 | End: 2022-08-10 | Stop reason: HOSPADM

## 2022-08-10 RX ADMIN — AMPICILLIN SODIUM AND SULBACTAM SODIUM 3 G: 2; 1 INJECTION, POWDER, FOR SOLUTION INTRAMUSCULAR; INTRAVENOUS at 01:32

## 2022-08-10 RX ADMIN — PREDNISONE 40 MG: 20 TABLET ORAL at 08:04

## 2022-08-10 RX ADMIN — GUAIFENESIN AND DEXTROMETHORPHAN 10 ML: 100; 10 SYRUP ORAL at 02:32

## 2022-08-10 RX ADMIN — CETIRIZINE HYDROCHLORIDE 10 MG: 10 TABLET, FILM COATED ORAL at 08:05

## 2022-08-10 RX ADMIN — AMPICILLIN SODIUM AND SULBACTAM SODIUM 3 G: 2; 1 INJECTION, POWDER, FOR SOLUTION INTRAMUSCULAR; INTRAVENOUS at 08:06

## 2022-08-10 RX ADMIN — LORAZEPAM 1 MG: 1 TABLET ORAL at 08:16

## 2022-08-10 ASSESSMENT — ACTIVITIES OF DAILY LIVING (ADL)
ADLS_ACUITY_SCORE: 45
WALKING_OR_CLIMBING_STAIRS_DIFFICULTY: YES
TOILETING_ISSUES: YES
DRESS: 1-->ASSISTANCE (EQUIPMENT/PERSON) NEEDED (NOT DEVELOPMENTALLY APPROPRIATE)
DOING_ERRANDS_INDEPENDENTLY_DIFFICULTY: YES
TOILETING_ASSISTANCE: TOILETING DIFFICULTY, ASSISTANCE 1 PERSON
DRESSING/BATHING: BATHING DIFFICULTY, ASSISTANCE 1 PERSON;DRESSING DIFFICULTY, ASSISTANCE 1 PERSON
ADLS_ACUITY_SCORE: 45
DIFFICULTY_EATING/SWALLOWING: NO
WEAR_GLASSES_OR_BLIND: YES
ADLS_ACUITY_SCORE: 45
TOILETING: 1-->ASSISTANCE (EQUIPMENT/PERSON) NEEDED (NOT DEVELOPMENTALLY APPROPRIATE)
CONCENTRATING,_REMEMBERING_OR_MAKING_DECISIONS_DIFFICULTY: YES
ADLS_ACUITY_SCORE: 37
TRANSFERRING: 1-->ASSISTANCE (EQUIPMENT/PERSON) NEEDED (NOT DEVELOPMENTALLY APPROPRIATE)
ADLS_ACUITY_SCORE: 45
ADLS_ACUITY_SCORE: 37
DRESS: 1-->ASSISTANCE (EQUIPMENT/PERSON) NEEDED
BATHING: 1-->ASSISTANCE NEEDED
WALKING_OR_CLIMBING_STAIRS: AMBULATION DIFFICULTY, ASSISTANCE 1 PERSON;STAIR CLIMBING DIFFICULTY, ASSISTANCE 1 PERSON;TRANSFERRING DIFFICULTY, ASSISTANCE 1 PERSON
TRANSFERRING: 1-->ASSISTANCE (EQUIPMENT/PERSON) NEEDED
TOILETING: 1-->ASSISTANCE (EQUIPMENT/PERSON) NEEDED
EQUIPMENT_CURRENTLY_USED_AT_HOME: CANE, STRAIGHT
DRESSING/BATHING_DIFFICULTY: YES

## 2022-08-10 NOTE — PLAN OF CARE
Patient's After Visit Summary was reviewed with patient and/or advocate.   Patient verbalized understanding of After Visit Summary, recommended follow up and was given an opportunity to ask questions.   Discharge medications sent home with patient/family: YES   Discharged with other granddaughter               OBSERVATION patient END time: 11:57

## 2022-08-10 NOTE — PLAN OF CARE
"[id number]    Inpatient Progress Note:    BP (!) 166/81 (BP Location: Right arm)   Pulse 63   Temp 98.4  F (36.9  C) (Oral)   Resp 16   Ht 1.626 m (5' 4.02\")   Wt 84.2 kg (185 lb 9.6 oz)   SpO2 93%   BMI 31.84 kg/m         Orientation: alert and oriented x4 with some forgetfulness  Neuro: alert and awake  Pain status: denies pain   Activity: SBA 1, SOB with exertion  Peripheral edema:  RLE +1 edema on ankle and foot.  Resp: regular, unlaboured,LS auscultated, wheezing on expiration.  Dry non-productive cough, given PRN Robitussin x2 with relief. IS use initiated and given to pt.  Cardiac: WDL  GI: WDL, denies nausea   :  WDL, continent x2  Skin: RLE cellulitis, rashes noted on upper back and buttocks  LDA: saline locked  Infusions: IV abx q6 for aspiration PNA    Diet: Regular diet/thin liquids     Discharge Plan: Home with son when medically cleared. Pt reported feeling weaker and was concerned about being discharged home.    Will continue to monitor and provide cares.     Celia Hughes RN        "

## 2022-08-10 NOTE — PROGRESS NOTES
"INPATIENT PROGRESS NOTE:    Vitals: /53 (BP Location: Left arm)   Pulse 72   Temp 97.8  F (36.6  C) (Oral)   Resp 18   Ht 1.626 m (5' 4.02\")   Wt 84.2 kg (185 lb 9.6 oz)   SpO2 92%   BMI 31.84 kg/m      Neuro: Intact, clear speech  GI: active bowl sounds.   : Void without difficulty.   Skin: Rash legs, back.  Activity: SBA with cane   Diet: Regular   Pain: Denies   Plan: discharge to son's home today. Ambulated in [place], pt needs walker.     "

## 2022-08-11 ENCOUNTER — PATIENT OUTREACH (OUTPATIENT)
Dept: CARE COORDINATION | Facility: CLINIC | Age: 82
End: 2022-08-11

## 2022-08-11 DIAGNOSIS — Z71.89 OTHER SPECIFIED COUNSELING: ICD-10-CM

## 2022-08-11 NOTE — PROGRESS NOTES
Clinic Care Coordination Contact  [facility]/Voicemail    Clinical Data: Care Coordinator Outreach  Outreach attempted x 1. Left message on patient's voicemail with call back information and requested return call.     Plan:Care Coordinator will try to reach patient again in 1-2 business days.    LUPE Bellamy  [phone]  [facility]

## 2022-08-11 NOTE — DISCHARGE SUMMARY
[facility]  Hospitalist Discharge Summary      Date of Admission:  8/7/2022  Date of Discharge:  8/10/2022  Discharging Provider: Juliana Solorio PA-C  Discharge Service: Hospitalist Service    Discharge Diagnoses   1. Urticarial skin lesions with superimposed cellulitis of RLE   2. Acute Bronchospasm, Possible aspiration pna  3. Anxiety     Follow-ups Needed After Discharge   Follow-up Appointments     Follow-up and recommended labs and tests       1. Follow up with primary care provider, within 7 days to evaluate   medication change and for hospital follow- up.  The following labs/tests   are recommended: CBC, BMP.    2.  You have been referred to dermatology, allergy for further work-up and   treatment of hives.  For now continue the Kenalog cream and antihistamines   (atarax, zyrtec), Ice, as needed         {    Unresulted Labs Ordered in the Past 30 Days of this Admission     No orders found from 7/8/2022 to 8/8/2022.      These results will be followed up by n/a    Discharge Disposition   Discharged to home with family  Condition at discharge: Stable    Hospital Course Keisha Kolb is a 82 year old female with a past medical history of anxiety and hyperlipidemia who presents with skin lesions.     Patient was at a wedding in mid June when she started to notice raised, itchy skin lesions on her bilateral lower extremity and lower back.  Not painful. She did not have any associated fevers or chills. She did recently start Aricept in the days prior which has since been discontinued.  She denies any changes in detergents or known bug bites.  No changes in clothing.  She could not think of any other exposures at that time. She saw her primary care who prescribed her a prednisone course and cetirizine.  Her symptoms did improve but then have recurred intermittently.       She was admitted on 8/7/2022 for recurrence of her lesions along with swelling and warmth of her right lower  extremity.  She denies any chest pain. She has had some shortness of breath over the last few days but no wheezing.  No nausea vomiting. No changes in her voice.  No known allergies.     In ER, patient afebrile and hemodynamically stable.  CBC without any leukocytosis. BMP unremarkable.  D-dimer was positive. CT PE was negative for PE but did show possible aspiration pneumonia.  Lower extremity ultrasound negative for DVT.  Patient was given Methylpred, ceftriaxone, azithromycin.    #Urticarial skin lesions with likely superimposed cellulitis of RLE  - suspect RLE cellulitis from extensive itching from urticarial skin lesions  - ultrasound of LE negative for DVT  - does not appear septic   - upcoming outpatient appointment with allergist about urticarial skin lesions  - initiated on PO Prednisone 40 mg daily for 5 days, should compete on 8/12  - continue PTA Cetirizine 10 mg BID  - will avoid Benadryl due to making patient extremely drowsy, will have PRN Atarax available for itching  - transitioned from Unasyn to augmentin   - elevated RLE  - follow up with dermatology, allergy       #Acute Bronchospasm, Possible Aspiration Pneumonia  Cough, dyspnea. Noted on admission. Exacerbated today and mild dyspnea at rest. No acute respiratory distress or known aspiration event. No dysphagia   Imaging on admit demonstrated moderate right greater than left lower lobe predominant endobronchial contents. Mild bibasilar volume loss and atelectasis.  - augmentin as above.      #Anxiety: exacerbated with setting and concern about discharge.   - continue PTA Ativan and Citalopram      #HLD: continue Simvastatin        Consultations This Hospital Stay   CARE MANAGEMENT / SOCIAL WORK IP CONSULT    Code Status   Prior    Time Spent on this Encounter   I, Juliana Solorio PA-C, personally saw the patient today and spent greater than 30 minutes discharging this patient.       Juliana Solorio PA-C  [facility] OBSERVATION  "[facility]  [address]  [facility] [address]  Phone: [phone]  ______________________________________________________________________  Interval History:  Improved today. Non productive cough intermittent. Activity tolerance is fair. Up with cane. Afebrile. Has ongoing intermittent hives - upper thighs. No dyspnea, swelling.     Physical Exam   /53 (BP Location: Left arm)   Pulse 72   Temp 97.8  F (36.6  C) (Oral)   Resp 18   Ht 1.626 m (5' 4.02\")   Wt 84.2 kg (185 lb 9.6 oz)   SpO2 92%   BMI 31.84 kg/m        ConstitutionalAwake, alert, no apparent distress  HEENT: no angioedema or tongue swelling. No oral lesions.   Respiratory: No abnormal work of breathing. Some transmitted upper respiratory noises.  Slight crackles Right lung base. No wheezing.   Cardiovascular: Regular rate and rhythm, normal S1 and S2, and no murmur appreciated.   GI: Normal bowel sounds, soft, non-distended, non-tender.   Skin: raised, pruritic lesions most significant in bilateral lower extremities and excoriations of right LE. Mild lesions on both arms. Mild warm and swelling to RLE improved since yesterday.  Neuro: Alert & oriented x4. Speech is clear. Moving all extremities without lateralizing weakness.  Coordination and sensation grossly intact.   Psych: Appropriate affect.          Primary Care Physician   [facility]eds [facility]    Discharge Orders      Adult Dermatology Referral      Reason for your hospital stay    Cellulitis right ankle     Activity    Your activity upon discharge: activity as tolerated using walker     When to contact your care team    Call your primary care doctor if you have any of the following: temperature greater than 101 F, worsening shortness of breath, increased swelling, worsening pain, new or unrelenting diarrhea, or any other concerning symptoms. Call 911 or go to the emergency room if you need immediate assistance.     Follow-up and recommended labs and tests     1. Follow up " with primary care provider, within 7 days to evaluate medication change and for hospital follow- up.  The following labs/tests are recommended: CBC, BMP.    2.  You have been referred to dermatology, allergy for further work-up and treatment of hives.  For now continue the Kenalog cream and antihistamines (atarax, zyrtec), Ice, as needed     Diet    Follow this diet upon discharge: Orders Placed This Encounter      Regular Diet Adult       Significant Results and Procedures   Results for orders placed or performed during the hospital encounter of 08/07/22   US Lower Extremity Venous Duplex Bilateral    Narrative    EXAM: US LOWER EXTREMITY VENOUS DUPLEX BILATERAL  LOCATION: [facility]  DATE/TIME: 8/7/2022 1:39 PM    INDICATION: swelling, pain  COMPARISON: None.  TECHNIQUE: Venous Duplex ultrasound of bilateral lower extremities with and without compression, augmentation and duplex. Color flow and spectral Doppler with waveform analysis performed.    FINDINGS: Exam includes the common femoral, femoral, popliteal veins as well as segmentally visualized deep calf veins and greater saphenous vein.     RIGHT: No deep vein thrombosis. No superficial thrombophlebitis. No popliteal cyst.    LEFT: No deep vein thrombosis. No superficial thrombophlebitis. No popliteal cyst.      Impression    IMPRESSION:  1.  No deep venous thrombosis in the bilateral lower extremities.   CT Chest Pulmonary Embolism w Contrast    Narrative    EXAM: CT CHEST PULMONARY EMBOLISM W CONTRAST  LOCATION: [facility]  DATE/TIME: 8/7/2022 3:45 PM    INDICATION: Dyspnea. Positive d-dimer.  COMPARISON: None.  TECHNIQUE: CT chest pulmonary angiogram during arterial phase injection of IV contrast. Multiplanar reformats and MIP reconstructions were performed. Dose reduction techniques were used.   CONTRAST: 65mL Isovue 370.    FINDINGS:  ANGIOGRAM CHEST: Mild motion. No pulmonary embolism. Nonaneurysmal aorta  without dissection. Mild to moderate atherosclerosis.    LUNGS AND PLEURA: Moderate right greater than left lower lobe predominant endobronchial contents. Mild bibasilar volume loss and atelectasis. No pleural effusion or pneumothorax.    MEDIASTINUM/AXILLAE: Few borderline enlarged nodes are presumed reactive. Upper normal heart size. No pericardial effusion. Small hiatus hernia.    CORONARY ARTERY CALCIFICATION: Compromised by motion.    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     MUSCULOSKELETAL: Bony demineralization and degenerative changes. Left mastectomy.      Impression    IMPRESSION:    1.  No pulmonary embolism.    2.  Nonaneurysmal aorta without dissection.    3.  Moderate lower lobe predominant airway debris, raising concern for aspiration.    4.  Mild airway thickening / bronchitis. Probable mild bronchiectasis.             Discharge Medications   Discharge Medication List as of 8/10/2022 10:37 AM      START taking these medications    Details   acetaminophen (TYLENOL) 325 MG tablet Take 2 tablets (650 mg) by mouth every 6 hours as needed for mild pain or other (and adjunct with moderate or severe pain or per patient request), No Print Out      amoxicillin-clavulanate (AUGMENTIN) 875-125 MG tablet Take 1 tablet by mouth 2 times daily for 5 days, Disp-10 tablet, R-0, E-Prescribe      guaiFENesin-dextromethorphan (ROBITUSSIN DM) 100-10 MG/5ML syrup Take 10 mLs by mouth every 4 hours as needed for cough, No Print Out      hydrOXYzine (ATARAX) 25 MG tablet Take 1 tablet (25 mg) by mouth every 6 hours as needed for itching (adjuvant pain), Disp-32 tablet, R-0, E-Prescribe      predniSONE (DELTASONE) 20 MG tablet Take 2 tablets (40 mg) by mouth daily for 1 day, Disp-2 tablet, R-0, E-Prescribe      triamcinolone (KENALOG) 0.1 % external cream Apply  topically 2 times daily Apply to back, thighs (above area of previous cellulitis)Disp-28.4 g, [id number]         CONTINUE these medications which have NOT CHANGED    Details   cetirizine (ZYRTEC) 10 MG tablet Take 10 mg by mouth 2 times daily, Historical      CITALOPRAM HYDROBROMIDE PO Take 40 mg by mouth At Bedtime, Historical      LORAZEPAM PO Take 1 mg by mouth every morning, Historical      SIMVASTATIN PO Take 20 mg by mouth At Bedtime, Historical           Allergies   No Known Allergies

## 2022-08-12 NOTE — PROGRESS NOTES
"Clinic Care Coordination Contact  [facility]: Post-Discharge Note  SITUATION                                                      Admission:    Admission Date: 08/07/22   Reason for Admission: Skin lesions  Discharge:   Discharge Date: 08/10/22  Discharge Diagnosis: Cellulitis of right lower extremity, Aspiration pneumonia, unspecified aspiration pneumonia type, unspecified laterality, unspecified part of lung (H)    BACKGROUND                                                      Per hospital discharge summary and inpatient provider notes:    Keisha Kolb is a 82 year old female with past medical history including TIAs, anxiety, and hyperlipidemia, who presents with rash and shortness of breath.  Patient reports that she was diagnosed TIAs approximately 1/2 months ago, and she was initiated on Aricept.  She had hives to her bilateral lower extremities following this, and she was initiated on Benadryl.  She had increased confusion with benadryl, and she switched to prednisone.  She was started on Zyrtec twice a day by her primary care provider on 7/11/2022, and she is still taking this.  She states that it had been helping for a couple of weeks, but then the hives began to return on 7/29/2022.  She states that 11 days ago, she also began to have a dry cough.  She tested negative for COVID.  She has been having increased shortness of breath as well as wheezing since yesterday.  She states she has pain under her bilateral ribs, right greater than left when she coughs.  She denies chest pain, abdominal pain, nausea, vomiting, or headache.     ASSESSMENT      Enrollment  Primary Care Care Coordination Status: Not a Candidate    Discharge Assessment  How are you doing now that you are home?: \"I think I'm progressing here pretty well. My breathing is good and my lesions are real good.\"  How are your symptoms? (Red Flag symptoms escalate to triage hotline per guidelines): Improved  Do you feel your condition is " stable enough to be safe at home until your provider visit?: Yes  Does the patient have their discharge instructions? : Yes  Does the patient have questions regarding their discharge instructions? : No  Were you started on any new medications or were there changes to any of your previous medications? : Yes  Does the patient have all of their medications?: Yes  Do you have questions regarding any of your medications? : No  Do you have all of your needed medical supplies or equipment (DME)?  (i.e. oxygen tank, CPAP, cane, etc.): Yes  Discharge follow-up appointment scheduled within 14 calendar days? : Yes  Discharge Follow Up Appointment Date: 08/17/22  Discharge Follow Up Appointment Scheduled with?: Specialty Care Provider (Allergy)    Post-op ([facility] Only)  If the patient had a surgery or procedure, do they have any questions for a nurse?: No      PLAN                                                      Outpatient Plan:      Follow-up and recommended labs and tests  1. Follow up with primary care provider, within 7 days to evaluate medication change and for hospital follow- up. The following labs/tests are recommended: CBC, BMP.  2. You have been referred to dermatology, allergy for further work-up and treatment of hives. For now continue the Kenalog cream and antihistamines (atarax, zyrtec), Ice, as needed    Future Appointments   Date Time Provider Department Center   12/12/2022 10:30 AM Romulo Walsh MD [facility]         For any urgent concerns, please contact our 24 hour nurse triage line: [phone] ([address] [place] [address])         LUPE Bellamy  [phone]  Connected Care Resource [facility]

## 2022-09-11 ENCOUNTER — HOSPITAL ENCOUNTER (EMERGENCY)
Facility: CLINIC | Age: 82
Discharge: HOME | End: 2022-09-11
Attending: EMERGENCY MEDICINE | Admitting: EMERGENCY MEDICINE
Payer: COMMERCIAL

## 2022-09-11 VITALS — SYSTOLIC BLOOD PRESSURE: 147 MMHG | DIASTOLIC BLOOD PRESSURE: 71 MMHG | OXYGEN SATURATION: 97 % | HEART RATE: 92 BPM

## 2022-09-11 DIAGNOSIS — L50.9 HIVES: ICD-10-CM

## 2022-09-11 DIAGNOSIS — T78.2XXA ANAPHYLAXIS, INITIAL ENCOUNTER: ICD-10-CM

## 2022-09-11 DIAGNOSIS — E87.6 HYPOKALEMIA: ICD-10-CM

## 2022-09-11 LAB
ANION GAP SERPL CALCULATED.3IONS-SCNC: 9 MMOL/L (ref 7–15)
BASOPHILS # BLD AUTO: 0 10E3/UL (ref 0–0.2)
BASOPHILS NFR BLD AUTO: 0 %
BUN SERPL-MCNC: 14.3 MG/DL (ref 8–23)
CALCIUM SERPL-MCNC: 9.1 MG/DL (ref 8.8–10.2)
CHLORIDE SERPL-SCNC: 103 MMOL/L (ref 98–107)
CREAT SERPL-MCNC: 0.88 MG/DL (ref 0.51–0.95)
DEPRECATED HCO3 PLAS-SCNC: 29 MMOL/L (ref 22–29)
EOSINOPHIL # BLD AUTO: 0.2 10E3/UL (ref 0–0.7)
EOSINOPHIL NFR BLD AUTO: 2 %
ERYTHROCYTE [DISTWIDTH] IN BLOOD BY AUTOMATED COUNT: 13.1 % (ref 10–15)
GFR SERPL CREATININE-BSD FRML MDRD: 65 ML/MIN/1.73M2
GLUCOSE SERPL-MCNC: 160 MG/DL (ref 70–99)
HCT VFR BLD AUTO: 41.3 % (ref 35–47)
HGB BLD-MCNC: 12.8 G/DL (ref 11.7–15.7)
IMM GRANULOCYTES # BLD: 0 10E3/UL
IMM GRANULOCYTES NFR BLD: 0 %
LYMPHOCYTES # BLD AUTO: 2.4 10E3/UL (ref 0.8–5.3)
LYMPHOCYTES NFR BLD AUTO: 23 %
MCH RBC QN AUTO: 29.3 PG (ref 26.5–33)
MCHC RBC AUTO-ENTMCNC: 31 G/DL (ref 31.5–36.5)
MCV RBC AUTO: 95 FL (ref 78–100)
MONOCYTES # BLD AUTO: 0.8 10E3/UL (ref 0–1.3)
MONOCYTES NFR BLD AUTO: 8 %
NEUTROPHILS # BLD AUTO: 6.8 10E3/UL (ref 1.6–8.3)
NEUTROPHILS NFR BLD AUTO: 67 %
NRBC # BLD AUTO: 0 10E3/UL
NRBC BLD AUTO-RTO: 0 /100
PLATELET # BLD AUTO: 226 10E3/UL (ref 150–450)
POTASSIUM SERPL-SCNC: 3.3 MMOL/L (ref 3.4–5.3)
RBC # BLD AUTO: 4.37 10E6/UL (ref 3.8–5.2)
SODIUM SERPL-SCNC: 141 MMOL/L (ref 136–145)
WBC # BLD AUTO: 10.2 10E3/UL (ref 4–11)

## 2022-09-11 PROCEDURE — 85025 COMPLETE CBC W/AUTO DIFF WBC: CPT | Performed by: EMERGENCY MEDICINE

## 2022-09-11 PROCEDURE — 36415 COLL VENOUS BLD VENIPUNCTURE: CPT | Performed by: EMERGENCY MEDICINE

## 2022-09-11 PROCEDURE — 250N000013 HC RX MED GY IP 250 OP 250 PS 637: Performed by: EMERGENCY MEDICINE

## 2022-09-11 PROCEDURE — 82310 ASSAY OF CALCIUM: CPT | Performed by: EMERGENCY MEDICINE

## 2022-09-11 PROCEDURE — 99284 EMERGENCY DEPT VISIT MOD MDM: CPT

## 2022-09-11 RX ORDER — TRIAMCINOLONE ACETONIDE 1 MG/G
CREAM TOPICAL 2 TIMES DAILY PRN
Qty: 28.4 G | Refills: 0 | Status: SHIPPED | OUTPATIENT
Start: 2022-09-11 | End: 2023-11-20

## 2022-09-11 RX ORDER — HYDROXYZINE HYDROCHLORIDE 25 MG/1
25 TABLET, FILM COATED ORAL EVERY 6 HOURS PRN
Qty: 30 TABLET | Refills: 0 | Status: ON HOLD | OUTPATIENT
Start: 2022-09-11 | End: 2022-10-26

## 2022-09-11 RX ORDER — EPINEPHRINE 0.3 MG/.3ML
0.3 INJECTION SUBCUTANEOUS
Qty: 2 EACH | Refills: 0 | Status: SHIPPED | OUTPATIENT
Start: 2022-09-11

## 2022-09-11 RX ORDER — POTASSIUM CHLORIDE 1500 MG/1
40 TABLET, EXTENDED RELEASE ORAL ONCE
Status: COMPLETED | OUTPATIENT
Start: 2022-09-11 | End: 2022-09-11

## 2022-09-11 RX ORDER — PREDNISONE 20 MG/1
TABLET ORAL
Qty: 11 TABLET | Refills: 0 | Status: SHIPPED | OUTPATIENT
Start: 2022-09-11 | End: 2022-09-21

## 2022-09-11 RX ORDER — HYDROXYZINE HYDROCHLORIDE 25 MG/1
25 TABLET, FILM COATED ORAL ONCE
Status: COMPLETED | OUTPATIENT
Start: 2022-09-11 | End: 2022-09-11

## 2022-09-11 RX ADMIN — HYDROXYZINE HYDROCHLORIDE 25 MG: 25 TABLET ORAL at 04:36

## 2022-09-11 RX ADMIN — POTASSIUM CHLORIDE 40 MEQ: 1500 TABLET, EXTENDED RELEASE ORAL at 06:02

## 2022-09-11 ASSESSMENT — ACTIVITIES OF DAILY LIVING (ADL): ADLS_ACUITY_SCORE: 35

## 2022-09-11 NOTE — ED TRIAGE NOTES
At clinic cellulitis and . Now had hives and wound infected  Flared up yesterday, felt like her throat closing 2300 woke up family at 3 am and face swelling . No med's  Before .called 911. Ambulance gave solumedrol,benadryl, and epi pt doing well  now

## 2022-09-12 ASSESSMENT — ENCOUNTER SYMPTOMS
SHORTNESS OF BREATH: 0
CHILLS: 0
FACIAL SWELLING: 1
FEVER: 0

## 2022-09-13 NOTE — ED PROVIDER NOTES
History     Chief Complaint:  Rash      HPI   Keisah Kolb is a 82 year old female who presents with an allergic reaction.  Patient reports recent history of hives/urticaria that began earlier this year.  She reports that she often has hives on her lower extremities, and about 1 month ago it got so severe that she ended up with a secondary cellulitis and was admitted to the hospital.  She was discharged on steroids.  She reports symptoms resolved, but when the steroids ran out, she began getting hives on her legs again.  She reports she is scheduled with a dermatologist, but appointment was not available until December.  She states she was seen in urgent care earlier this evening as she was concerned that she had cellulitis again.  She was prescribed Keflex and prednisone, but did not pick them up from the pharmacy yet.  She did take some Benadryl however.  Granddaughter at bedside notes that right lower extremity looks far less red and swollen than it looked at urgent care just several hours ago.  Patient denies fever/chills.  Tonight, she felt like her throat was closing around 11 PM, and then by 3 AM her face and tongue were swollen prompting EMS activation.  EMS gave epi, Benadryl, and Solu-Medrol, and symptoms are significantly improved currently.    Review of Systems   Constitutional: Negative for chills and fever.   HENT: Positive for facial swelling.    Respiratory: Negative for shortness of breath.    Cardiovascular: Negative for chest pain.   Skin: Positive for rash.   All other systems reviewed and are negative.        Allergies:    No Known Allergies    Medications:      acetaminophen (TYLENOL) 325 MG tablet  cetirizine (ZYRTEC) 10 MG tablet  CITALOPRAM HYDROBROMIDE PO  EPINEPHrine (ANY BX GENERIC EQUIV) 0.3 MG/0.3ML injection 2-pack  guaiFENesin-dextromethorphan (ROBITUSSIN DM) 100-10 MG/5ML syrup  hydrOXYzine (ATARAX) 25 MG tablet  LORAZEPAM PO  predniSONE (DELTASONE) 20 MG tablet  SIMVASTATIN  PO  triamcinolone (KENALOG) 0.1 % external cream         Past Medical History:   Past Surgical History:     Past Medical History:   Diagnosis Date     Anxiety     Past Surgical History:   Procedure Laterality Date     masectomy        Patient Active Problem List    Diagnosis Date Noted     Cellulitis of right lower extremity 08/07/2022     Priority: Medium     Aspiration pneumonia, unspecified aspiration pneumonia type, unspecified laterality, unspecified part of lung (H) 08/07/2022     Priority: Medium     Ductal carcinoma in situ of left breast 01/30/2012     Priority: Medium     Formatting of this note might be different from the original.  Status post mastectomy January 2012.  ; Ductal carcinoma in situ of left breast (ACG)       Disorder of bone and cartilage 12/06/2010     Priority: Medium     Formatting of this note might be different from the original.  moderate January 2011  ; Osteopenia       Anxiety state 06/23/2005     Priority: Medium     Formatting of this note might be different from the original.  LW Onset:  [id number]  ; Anxiety  NOS       Pure hypercholesterolemia 06/23/2005     Priority: Medium     Formatting of this note might be different from the original.  LW Onset:  47Lva85  ; Hyperlipidemia On Treatment              Social History:  PCP: [facility] [place]  Presents to the ED with granddaughter.    Physical Exam       Physical Exam  Nursing note and vitals reviewed.  Constitutional: Cooperative.   HENT:   Mouth/Throat: Moist mucous membranes.  Normal posterior oropharynx.  No tongue swelling.  No stridor.  Eyes: EOMI, nonicteric sclera  Cardiovascular: Normal rate, regular rhythm, no murmurs, rubs, or gallops  Pulmonary/Chest: Effort normal and breath sounds normal. No respiratory distress. No wheezes. No rales.   Abdominal: Soft. Nontender, nondistended, no guarding or rigidity.   Musculoskeletal: Normal range of motion.   Neurological: Alert. Moves all extremities spontaneously.    Skin: Skin is warm and dry.  Diffuse hives noted, especially on lower back.  Right lower extremity mildly erythematous, but similar in appearance to the left.  There is evidence of recent scabs from scratch marks.  No increased warmth.  No drainage.  Psychiatric: Normal mood and affect.       Emergency Department Course       Laboratory:  Labs Ordered and Resulted from Time of ED Arrival to Time of ED Departure   BASIC METABOLIC PANEL - Abnormal       Result Value    Creatinine 0.88      Sodium 141      Potassium 3.3 (*)     Urea Nitrogen 14.3      Chloride 103      Carbon Dioxide (CO2) 29      Anion Gap 9      Glucose 160 (*)     GFR Estimate 65      Calcium 9.1     CBC WITH PLATELETS AND DIFFERENTIAL - Abnormal    WBC Count 10.2      RBC Count 4.37      Hemoglobin 12.8      Hematocrit 41.3      MCV 95      MCH 29.3      MCHC 31.0 (*)     RDW 13.1      Platelet Count 226      % Neutrophils 67      % Lymphocytes 23      % Monocytes 8      % Eosinophils 2      % Basophils 0      % Immature Granulocytes 0      NRBCs per 100 WBC 0      Absolute Neutrophils 6.8      Absolute Lymphocytes 2.4      Absolute Monocytes 0.8      Absolute Eosinophils 0.2      Absolute Basophils 0.0      Absolute Immature Granulocytes 0.0      Absolute NRBCs 0.0             Emergency Department Course:    Reviewed:  I reviewed nursing notes, vitals and past history    Assessments/Consults:   The patient arrived in triage where vitals were measured and recorded.   The patient was then escorted back to the emergency department.   The patient's medical records were reviewed.  Nursing notes and vitals were reviewed.    I performed an exam of the patient as documented above. The patient is in agreement with my plan of care.       Interventions:  Medications   hydrOXYzine (ATARAX) tablet 25 mg (25 mg Oral Given 9/11/22 0436)   potassium chloride ER (KLOR-CON M) CR tablet 40 mEq (40 mEq Oral Given 9/11/22 0602)       Disposition:  The patient was  discharged to home.    Impression & Plan           Medical Decision Making:  Keisha Kolb is a 82 year old female who presents for evaluation of facial swelling, close throat feeling.  Patient with several month history of hives, but never with facial swelling.  She was prescribed steroids and antibiotics several hours ago from urgent care, but did not start them, therefore this is certainly not an allergy to the antibiotic.  She received appropriate anaphylaxis medications from EMS and now reports feeling significantly improved.  Granddaughter at bedside corroborates.  Signs and symptoms are consistent with anaphylaxis.  Will send home with epipen, steroids, antihistamines -patient recommended to use the steroid taper as opposed to urgent care dosing of the prednisone.  Also, patient has history of hydroxyzine working better than Benadryl therefore this was prescribed as well.  Encouraged continued cetirizine use.  Given what is now a 3 to 4-month history of hives, patient should be evaluated by allergist.  She has been referred already, and I provided some additional local clinics in case they can get her in sooner.  Return of anaphylactic symptoms were discussed with patient and they were instructed to inject epi-pen and call 911 should these symptoms occur.  Given the rapidity of resolution, lack of serious systemic symptoms, lack of respiratory difficulty and no oral or pharyngeal swelling, would not admit at this time for anaphylaxis. There is no signs of anaphylactic shock.          Covid-19  Keisha Kolb was evaluated during a global COVID-19 pandemic, which necessitated consideration that the patient might be at risk for infection with the SARS-CoV-2 virus that causes COVID-19.  Applicable protocols for evaluation were followed during the patient's care. COVID-19 was considered as part of the patient's evaluation.       Diagnosis:    ICD-10-CM    1. Anaphylaxis, initial encounter  T78.2XXA    2.  Hives  L50.9 triamcinolone (KENALOG) 0.1 % external cream   3. Hypokalemia  E87.6        Discharge Medications:  Discharge Medication List as of 9/11/2022  6:23 AM      START taking these medications    Details   EPINEPHrine (ANY BX GENERIC EQUIV) 0.3 MG/0.3ML injection 2-pack Inject 0.3 mLs (0.3 mg) into the muscle once as needed for anaphylaxis May repeat one time in 5-15 minutes if response to initial dose is inadequate., Disp-2 each, R-0, E-Prescribe      predniSONE (DELTASONE) 20 MG tablet Take 2 tablets (40 mg) by mouth daily for 3 days, THEN 1 tablet (20 mg) daily for 3 days, THEN 0.5 tablets (10 mg) daily for 4 days., Disp-11 tablet, R-0, E-Prescribe                      Eddy Love MD  09/12/22 3965

## 2022-10-15 ENCOUNTER — HEALTH MAINTENANCE LETTER (OUTPATIENT)
Age: 82
End: 2022-10-15

## 2022-10-23 ENCOUNTER — APPOINTMENT (OUTPATIENT)
Dept: GENERAL RADIOLOGY | Facility: CLINIC | Age: 82
DRG: 177 | End: 2022-10-23
Attending: EMERGENCY MEDICINE
Payer: COMMERCIAL

## 2022-10-23 ENCOUNTER — APPOINTMENT (OUTPATIENT)
Dept: CT IMAGING | Facility: CLINIC | Age: 82
DRG: 177 | End: 2022-10-23
Attending: EMERGENCY MEDICINE
Payer: COMMERCIAL

## 2022-10-23 ENCOUNTER — HOSPITAL ENCOUNTER (INPATIENT)
Facility: CLINIC | Age: 82
LOS: 3 days | Discharge: HOME HEALTH | DRG: 177 | End: 2022-10-26
Attending: EMERGENCY MEDICINE | Admitting: INTERNAL MEDICINE
Payer: COMMERCIAL
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
## 2022-10-23 DIAGNOSIS — J96.01 ACUTE RESPIRATORY FAILURE WITH HYPOXIA (H): ICD-10-CM

## 2022-10-23 DIAGNOSIS — R05.1 ACUTE COUGH: ICD-10-CM

## 2022-10-23 DIAGNOSIS — R41.82 ALTERED MENTAL STATUS, UNSPECIFIED ALTERED MENTAL STATUS TYPE: ICD-10-CM

## 2022-10-23 LAB
ALBUMIN UR-MCNC: NEGATIVE MG/DL
ANION GAP SERPL CALCULATED.3IONS-SCNC: 10 MMOL/L (ref 7–15)
APPEARANCE UR: ABNORMAL
BACTERIA #/AREA URNS HPF: ABNORMAL /HPF
BASE EXCESS BLDV CALC-SCNC: 3.5 MMOL/L (ref -7.7–1.9)
BASOPHILS # BLD AUTO: 0 10E3/UL (ref 0–0.2)
BASOPHILS NFR BLD AUTO: 0 %
BILIRUB UR QL STRIP: NEGATIVE
BUN SERPL-MCNC: 9 MG/DL (ref 8–23)
CALCIUM SERPL-MCNC: 8.9 MG/DL (ref 8.8–10.2)
CHLORIDE SERPL-SCNC: 100 MMOL/L (ref 98–107)
CK SERPL-CCNC: 62 U/L (ref 26–192)
COLOR UR AUTO: YELLOW
CREAT SERPL-MCNC: 0.87 MG/DL (ref 0.51–0.95)
CREAT SERPL-MCNC: 0.87 MG/DL (ref 0.51–0.95)
DEPRECATED HCO3 PLAS-SCNC: 26 MMOL/L (ref 22–29)
EOSINOPHIL # BLD AUTO: 0 10E3/UL (ref 0–0.7)
EOSINOPHIL NFR BLD AUTO: 1 %
ERYTHROCYTE [DISTWIDTH] IN BLOOD BY AUTOMATED COUNT: 13.3 % (ref 10–15)
FLUAV RNA SPEC QL NAA+PROBE: NEGATIVE
FLUBV RNA RESP QL NAA+PROBE: NEGATIVE
GFR SERPL CREATININE-BSD FRML MDRD: 66 ML/MIN/1.73M2
GFR SERPL CREATININE-BSD FRML MDRD: 66 ML/MIN/1.73M2
GLUCOSE BLDC GLUCOMTR-MCNC: 110 MG/DL (ref 70–99)
GLUCOSE SERPL-MCNC: 117 MG/DL (ref 70–99)
GLUCOSE UR STRIP-MCNC: NEGATIVE MG/DL
HCO3 BLDV-SCNC: 28 MMOL/L (ref 21–28)
HCT VFR BLD AUTO: 41 % (ref 35–47)
HGB BLD-MCNC: 13.1 G/DL (ref 11.7–15.7)
HGB UR QL STRIP: ABNORMAL
HOLD SPECIMEN: NORMAL
HOLD SPECIMEN: NORMAL
IMM GRANULOCYTES # BLD: 0 10E3/UL
IMM GRANULOCYTES NFR BLD: 1 %
KETONES UR STRIP-MCNC: NEGATIVE MG/DL
LACTATE SERPL-SCNC: 0.8 MMOL/L (ref 0.7–2)
LEUKOCYTE ESTERASE UR QL STRIP: ABNORMAL
LYMPHOCYTES # BLD AUTO: 0.6 10E3/UL (ref 0.8–5.3)
LYMPHOCYTES NFR BLD AUTO: 7 %
MCH RBC QN AUTO: 30 PG (ref 26.5–33)
MCHC RBC AUTO-ENTMCNC: 32 G/DL (ref 31.5–36.5)
MCV RBC AUTO: 94 FL (ref 78–100)
MONOCYTES # BLD AUTO: 0.5 10E3/UL (ref 0–1.3)
MONOCYTES NFR BLD AUTO: 6 %
NEUTROPHILS # BLD AUTO: 7.2 10E3/UL (ref 1.6–8.3)
NEUTROPHILS NFR BLD AUTO: 85 %
NITRATE UR QL: NEGATIVE
NRBC # BLD AUTO: 0 10E3/UL
NRBC BLD AUTO-RTO: 0 /100
NT-PROBNP SERPL-MCNC: 188 PG/ML (ref 0–1800)
O2/TOTAL GAS SETTING VFR VENT: 4 %
PCO2 BLDV: 42 MM HG (ref 40–50)
PH BLDV: 7.44 [PH] (ref 7.32–7.43)
PH UR STRIP: 7.5 [PH] (ref 5–7)
PLATELET # BLD AUTO: 161 10E3/UL (ref 150–450)
PO2 BLDV: 44 MM HG (ref 25–47)
POTASSIUM SERPL-SCNC: 4 MMOL/L (ref 3.4–5.3)
RBC # BLD AUTO: 4.37 10E6/UL (ref 3.8–5.2)
RBC URINE: 2 /HPF
RSV RNA SPEC NAA+PROBE: NEGATIVE
SARS-COV-2 RNA RESP QL NAA+PROBE: POSITIVE
SODIUM SERPL-SCNC: 136 MMOL/L (ref 136–145)
SP GR UR STRIP: 1.01 (ref 1–1.03)
SQUAMOUS EPITHELIAL: 2 /HPF
TROPONIN T SERPL HS-MCNC: 12 NG/L
UROBILINOGEN UR STRIP-MCNC: NORMAL MG/DL
WBC # BLD AUTO: 8.3 10E3/UL (ref 4–11)
WBC URINE: 4 /HPF

## 2022-10-23 PROCEDURE — 0042T CT HEAD PERFUSION W CONTRAST: CPT

## 2022-10-23 PROCEDURE — 250N000009 HC RX 250: Performed by: EMERGENCY MEDICINE

## 2022-10-23 PROCEDURE — 70496 CT ANGIOGRAPHY HEAD: CPT

## 2022-10-23 PROCEDURE — 80048 BASIC METABOLIC PNL TOTAL CA: CPT | Performed by: EMERGENCY MEDICINE

## 2022-10-23 PROCEDURE — 81001 URINALYSIS AUTO W/SCOPE: CPT | Performed by: EMERGENCY MEDICINE

## 2022-10-23 PROCEDURE — 99223 1ST HOSP IP/OBS HIGH 75: CPT | Mod: AI | Performed by: INTERNAL MEDICINE

## 2022-10-23 PROCEDURE — C9803 HOPD COVID-19 SPEC COLLECT: HCPCS

## 2022-10-23 PROCEDURE — 250N000011 HC RX IP 250 OP 636: Performed by: EMERGENCY MEDICINE

## 2022-10-23 PROCEDURE — 82550 ASSAY OF CK (CPK): CPT | Performed by: EMERGENCY MEDICINE

## 2022-10-23 PROCEDURE — 82565 ASSAY OF CREATININE: CPT | Performed by: INTERNAL MEDICINE

## 2022-10-23 PROCEDURE — 96374 THER/PROPH/DIAG INJ IV PUSH: CPT

## 2022-10-23 PROCEDURE — 70450 CT HEAD/BRAIN W/O DYE: CPT

## 2022-10-23 PROCEDURE — 120N000001 HC R&B MED SURG/OB

## 2022-10-23 PROCEDURE — 36415 COLL VENOUS BLD VENIPUNCTURE: CPT | Performed by: INTERNAL MEDICINE

## 2022-10-23 PROCEDURE — 36415 COLL VENOUS BLD VENIPUNCTURE: CPT | Performed by: EMERGENCY MEDICINE

## 2022-10-23 PROCEDURE — 84484 ASSAY OF TROPONIN QUANT: CPT | Performed by: EMERGENCY MEDICINE

## 2022-10-23 PROCEDURE — 99291 CRITICAL CARE FIRST HOUR: CPT | Mod: 25

## 2022-10-23 PROCEDURE — 83605 ASSAY OF LACTIC ACID: CPT | Performed by: EMERGENCY MEDICINE

## 2022-10-23 PROCEDURE — 70498 CT ANGIOGRAPHY NECK: CPT

## 2022-10-23 PROCEDURE — 87040 BLOOD CULTURE FOR BACTERIA: CPT | Performed by: EMERGENCY MEDICINE

## 2022-10-23 PROCEDURE — 71046 X-RAY EXAM CHEST 2 VIEWS: CPT

## 2022-10-23 PROCEDURE — 99292 CRITICAL CARE ADDL 30 MIN: CPT

## 2022-10-23 PROCEDURE — 250N000013 HC RX MED GY IP 250 OP 250 PS 637: Performed by: EMERGENCY MEDICINE

## 2022-10-23 PROCEDURE — 87086 URINE CULTURE/COLONY COUNT: CPT | Performed by: EMERGENCY MEDICINE

## 2022-10-23 PROCEDURE — 83880 ASSAY OF NATRIURETIC PEPTIDE: CPT | Performed by: EMERGENCY MEDICINE

## 2022-10-23 PROCEDURE — 85025 COMPLETE CBC W/AUTO DIFF WBC: CPT | Performed by: EMERGENCY MEDICINE

## 2022-10-23 PROCEDURE — 93005 ELECTROCARDIOGRAM TRACING: CPT

## 2022-10-23 PROCEDURE — 99207 PR NO BILLABLE SERVICE THIS VISIT: CPT | Performed by: STUDENT IN AN ORGANIZED HEALTH CARE EDUCATION/TRAINING PROGRAM

## 2022-10-23 PROCEDURE — 82803 BLOOD GASES ANY COMBINATION: CPT | Performed by: EMERGENCY MEDICINE

## 2022-10-23 PROCEDURE — 87637 SARSCOV2&INF A&B&RSV AMP PRB: CPT | Performed by: EMERGENCY MEDICINE

## 2022-10-23 RX ORDER — HYDROXYZINE HYDROCHLORIDE 25 MG/1
25 TABLET, FILM COATED ORAL EVERY 6 HOURS PRN
Status: DISCONTINUED | OUTPATIENT
Start: 2022-10-23 | End: 2022-10-26 | Stop reason: HOSPADM

## 2022-10-23 RX ORDER — LIDOCAINE 40 MG/G
CREAM TOPICAL
Status: DISCONTINUED | OUTPATIENT
Start: 2022-10-23 | End: 2022-10-26 | Stop reason: HOSPADM

## 2022-10-23 RX ORDER — CITALOPRAM HYDROBROMIDE 20 MG/1
40 TABLET ORAL AT BEDTIME
Status: DISCONTINUED | OUTPATIENT
Start: 2022-10-23 | End: 2022-10-26 | Stop reason: HOSPADM

## 2022-10-23 RX ORDER — AMOXICILLIN 250 MG
2 CAPSULE ORAL 2 TIMES DAILY PRN
Status: DISCONTINUED | OUTPATIENT
Start: 2022-10-23 | End: 2022-10-26 | Stop reason: HOSPADM

## 2022-10-23 RX ORDER — ACETAMINOPHEN 650 MG/1
650 SUPPOSITORY RECTAL ONCE
Status: DISCONTINUED | OUTPATIENT
Start: 2022-10-23 | End: 2022-10-24

## 2022-10-23 RX ORDER — ACETAMINOPHEN 325 MG/1
325 TABLET ORAL ONCE
Status: DISCONTINUED | OUTPATIENT
Start: 2022-10-23 | End: 2022-10-24

## 2022-10-23 RX ORDER — ACETAMINOPHEN 325 MG/1
650 TABLET ORAL EVERY 6 HOURS PRN
Status: DISCONTINUED | OUTPATIENT
Start: 2022-10-24 | End: 2022-10-26 | Stop reason: HOSPADM

## 2022-10-23 RX ORDER — CETIRIZINE HYDROCHLORIDE 10 MG/1
10 TABLET ORAL 2 TIMES DAILY
Status: DISCONTINUED | OUTPATIENT
Start: 2022-10-23 | End: 2022-10-26 | Stop reason: HOSPADM

## 2022-10-23 RX ORDER — ACETAMINOPHEN 650 MG/1
650 SUPPOSITORY RECTAL EVERY 6 HOURS PRN
Status: DISCONTINUED | OUTPATIENT
Start: 2022-10-24 | End: 2022-10-26 | Stop reason: HOSPADM

## 2022-10-23 RX ORDER — ACETAMINOPHEN 325 MG/1
650 TABLET ORAL ONCE
Status: COMPLETED | OUTPATIENT
Start: 2022-10-23 | End: 2022-10-23

## 2022-10-23 RX ORDER — ONDANSETRON 4 MG/1
4 TABLET, ORALLY DISINTEGRATING ORAL EVERY 6 HOURS PRN
Status: DISCONTINUED | OUTPATIENT
Start: 2022-10-23 | End: 2022-10-26 | Stop reason: HOSPADM

## 2022-10-23 RX ORDER — ONDANSETRON 2 MG/ML
4 INJECTION INTRAMUSCULAR; INTRAVENOUS EVERY 6 HOURS PRN
Status: DISCONTINUED | OUTPATIENT
Start: 2022-10-23 | End: 2022-10-26 | Stop reason: HOSPADM

## 2022-10-23 RX ORDER — ENOXAPARIN SODIUM 100 MG/ML
40 INJECTION SUBCUTANEOUS EVERY 24 HOURS
Status: DISCONTINUED | OUTPATIENT
Start: 2022-10-23 | End: 2022-10-26 | Stop reason: HOSPADM

## 2022-10-23 RX ORDER — DEXAMETHASONE SODIUM PHOSPHATE 10 MG/ML
6 INJECTION, SOLUTION INTRAMUSCULAR; INTRAVENOUS ONCE
Status: COMPLETED | OUTPATIENT
Start: 2022-10-23 | End: 2022-10-23

## 2022-10-23 RX ORDER — LORAZEPAM 0.5 MG/1
0.5 TABLET ORAL DAILY PRN
Status: DISCONTINUED | OUTPATIENT
Start: 2022-10-23 | End: 2022-10-25

## 2022-10-23 RX ORDER — IOPAMIDOL 755 MG/ML
500 INJECTION, SOLUTION INTRAVASCULAR ONCE
Status: COMPLETED | OUTPATIENT
Start: 2022-10-23 | End: 2022-10-23

## 2022-10-23 RX ORDER — AMOXICILLIN 250 MG
1 CAPSULE ORAL 2 TIMES DAILY PRN
Status: DISCONTINUED | OUTPATIENT
Start: 2022-10-23 | End: 2022-10-26 | Stop reason: HOSPADM

## 2022-10-23 RX ADMIN — IOPAMIDOL 120 ML: 755 INJECTION, SOLUTION INTRAVENOUS at 18:46

## 2022-10-23 RX ADMIN — SODIUM CHLORIDE 95 ML: 9 INJECTION, SOLUTION INTRAVENOUS at 18:46

## 2022-10-23 RX ADMIN — DEXAMETHASONE SODIUM PHOSPHATE 6 MG: 10 INJECTION, SOLUTION INTRAMUSCULAR; INTRAVENOUS at 20:26

## 2022-10-23 RX ADMIN — ACETAMINOPHEN 650 MG: 325 TABLET, FILM COATED ORAL at 20:25

## 2022-10-23 ASSESSMENT — ENCOUNTER SYMPTOMS
BACK PAIN: 0
ABDOMINAL PAIN: 0
FEVER: 1
COUGH: 1
SPEECH DIFFICULTY: 1
NAUSEA: 1
VOMITING: 0
CONFUSION: 1

## 2022-10-23 ASSESSMENT — ACTIVITIES OF DAILY LIVING (ADL)
ADLS_ACUITY_SCORE: 35
ADLS_ACUITY_SCORE: 31
ADLS_ACUITY_SCORE: 35

## 2022-10-23 ASSESSMENT — VISUAL ACUITY: OU: NORMAL ACUITY

## 2022-10-23 NOTE — ED NOTES
Bed: HW06  Expected date: 10/23/22  Expected time: 5:05 PM  Means of arrival: Ambulance  Comments:  Luis [phone] F fall, AMS

## 2022-10-23 NOTE — CONSULTS
[facility]    Stroke Telephone Note    I was called by Lawrence Perez on 10/23/22 regarding patient Keisha Kolb. The patient is a 82 year old female with PMH of HLD, Breast cancer and multiple TIA was brought to the hospital after she was found down, confused with slurry speech around 5 PM by family. LSN 1400. She has had cough over the past few days and has low grade fever & hypoxia on arrival (Sats 88%). On arrival, she was found to be dysarthric and had some gait disturbances. Dysarthria started improving after the scan.     Stroke Code Data (for stroke code without tele)  Stroke code activated 10/23/22   1826   Stroke provider first response  10/23/22   1828            Last known normal 10/23/22   1400        Time of discovery   (or onset of symptoms) 10/23/22   1700   Head CT read by Stroke Neuro Dr/Provider 10/23/22   1844   Was stroke code de-escalated? Yes 10/23/22 1905          Imaging Findings   CT Head unremarkable  CTA Head/Neck: No occlusion or hemodynamically significant stenosis  CT Perfusion: No perfusion deficits    Intravenous Thrombolysis  Not given due to:   - unclear or unfavorable risk-benefit profile for extended window thrombolysis beyond the conventional 4.5 hour time window    Endovascular Treatment  Not initiated due to absence of proximal vessel occlusion    Impression  Possible posterior circulation stroke vs Toxic metabolic encephalopathy    Recommendations   MRI Brain wwo contrast when able  Toxic/Metabolic workup per ED    My recommendations are based on the information provided over the phone by Keisha Kolb's in-person providers. They are not intended to replace the clinical judgment of her in-person providers. I was not requested to personally see or examine the patient at this time.    Johnnie Chavarria MD       Department of Neurology       Securely message with the Vocera Web Console (learn more here)   To page me  "or covering stroke neurology team member, click here: AMCOM  Choose \"On Call\" tab at top, then search dropdown box for \"Neurology Adult\", select location, press Enter, then look for stroke/neuro ICU/telestroke.         "

## 2022-10-23 NOTE — ED PROVIDER NOTES
History   Chief Complaint:  Fall and Altered Mental Status       The history is provided by the patient and a caregiver.      Keisha Kolb is a 82 year old female with history of TIA who presents with fall, altered mental state. The patient presents with her granddaughter who is a  who states that she was last seen normal at 1400 today with just a cough that had been present for the past 2 days, then found on the ground around 1645. While getting her off the floor she expressed some nausea and was feeling febrile, after talking with the patient for a short time, the granddaughter noticed some slurred speech and confusion which prompted her to call EMS. The patient was able to walk down the stairs (though required assistance) to EMS and was found to have O2 saturation of 88% on room air, she was placed on 4 L of O2 and is usually not on O2 at home. She is not taking any blood thinners and did not take any Tylenol or Ibuprofen after fall. The patient denies any chest pain, abdominal pain, vomiting, or back pain.      Review of Systems   Constitutional: Positive for fever.   Respiratory: Positive for cough.    Cardiovascular: Negative for chest pain.   Gastrointestinal: Positive for nausea. Negative for abdominal pain and vomiting.   Musculoskeletal: Negative for back pain.   Neurological: Positive for speech difficulty.   Psychiatric/Behavioral: Positive for confusion.   All other systems reviewed and are negative.    Allergies:  Donepezil   Gabapentin     Medications:  Citalopram   Epinephrine   Atarax   Lorazepam    Simvastatin   Zocor   Advair    Past Medical History:     Anxiety   Disorder of bone and cartilage  Ductal carcinoma in situ of left breast   Hypercholesterolemia   Cellulitis   Pneumonia      TIA x 5  Hyperlipidemia   Osteopenia   Squamous cell carcinoma     Past Surgical History:    Mastectomy    Tubal ligation   Breast surgery   MOHS surgery   Nose surgery     Family History:     Cardiovascular disease   Diabetes   Macular degeneration     Social History:  Patient came from home.  Patient is accompanied in the ED with granddaughter.  PCP: [facility] [place]     Physical Exam     Patient Vitals for the past 24 hrs:   BP Temp Pulse Resp SpO2   10/23/22 2005 134/74 -- 75 25 --   10/23/22 1732 (!) 140/75 100.2  F (37.9  C) 83 20 96 %       Physical Exam  General:              Well-nourished              Speaking in full sentences              Confused responses to some questions  Eyes:              Conjunctiva without injection or scleral icterus  ENT:              Moist mucous membranes              Nares patent              Pinnae normal  Neck:              Full ROM              No stiffness appreciated  Resp:              Lungs CTAB              No crackles, wheezing or audible rubs              Good air movement  CV:                    Normal rate, regular rhythm              S1 and S2 present              No murmur, gallop or rub  GI:              BS present              Abdomen soft without distention              Non-tender to light and deep palpation              No guarding or rebound tenderness  Skin:              Warm, dry, well perfused              Splotchy erythematous rash to upper posterior torso  MSK:              Moves all extremities              No focal deformities or swelling  Neuro:              Alert              CN III-XII grossly intact              5/5  strength              No leg drift              SILT in BUE and BLE              Positive Romberg, gait imbalance  Psych:              Normal affect, normal mood    Emergency Department Course   ECG  ECG obtained at 1909, ECG read at 1915  Normal sinus rhythm   Right bundle branch block   Abnormal ECG  No significant changes from prior ECG dated 8/7/22.  Rate 76 bpm. NM interval 160 ms. QRS duration 124 ms. QT/QTc 428/481 ms. P-R-T axes 60 65 21.    Imaging:  XR Chest 2 Views   Final Result   IMPRESSION:  Normal heart size and pulmonary vascularity. No focal alveolar infiltrate, consolidation or significant pleural fluid. No pneumothorax. Tortuous and ectatic calcified thoracic aorta. Degenerative changes in the spine and shoulders.      CT Head Perfusion w Contrast   Final Result   IMPRESSION:    HEAD CT:   1.  No CT evidence for acute intracranial process.   2.  Brain atrophy and presumed chronic microvascular ischemic changes as above.   3.  Left frontal and ethmoid sinusitis.      HEAD CTA:    1.  Normal CTA [place].      NECK CTA:   1.  No high-grade carotid or vertebral artery stenosis, occlusion, or dissection.   2.  Mild to moderate stenoses of the bilateral vertebral arteries at the C3-C4 level secondary to severe facet and uncovertebral hypertrophy at this level.      CT PERFUSION:   1.  Normal cerebral perfusion.      2.  I discussed the negatives stroke findings with Dr. Perez at 7:05 PM 10/23/2022      CTA Head Neck w Contrast   Final Result   IMPRESSION:    HEAD CT:   1.  No CT evidence for acute intracranial process.   2.  Brain atrophy and presumed chronic microvascular ischemic changes as above.   3.  Left frontal and ethmoid sinusitis.      HEAD CTA:    1.  Normal CTA [place].      NECK CTA:   1.  No high-grade carotid or vertebral artery stenosis, occlusion, or dissection.   2.  Mild to moderate stenoses of the bilateral vertebral arteries at the C3-C4 level secondary to severe facet and uncovertebral hypertrophy at this level.      CT PERFUSION:   1.  Normal cerebral perfusion.      2.  I discussed the negatives stroke findings with Dr. Perez at 7:05 PM 10/23/2022      CT Head w/o Contrast   Final Result   IMPRESSION:    HEAD CT:   1.  No CT evidence for acute intracranial process.   2.  Brain atrophy and presumed chronic microvascular ischemic changes as above.   3.  Left frontal and ethmoid sinusitis.      HEAD CTA:    1.  Normal CTA [place].      NECK CTA:   1.  No  high-grade carotid or vertebral artery stenosis, occlusion, or dissection.   2.  Mild to moderate stenoses of the bilateral vertebral arteries at the C3-C4 level secondary to severe facet and uncovertebral hypertrophy at this level.      CT PERFUSION:   1.  Normal cerebral perfusion.      2.  I discussed the negatives stroke findings with Dr. Perez at 7:05 PM 10/23/2022        Report per radiology    Laboratory:  Labs Ordered and Resulted from Time of ED Arrival to Time of ED Departure   BASIC METABOLIC PANEL - Abnormal       Result Value    Sodium 136      Potassium 4.0      Chloride 100      Carbon Dioxide (CO2) 26      Anion Gap 10      Urea Nitrogen 9.0      Creatinine 0.87      Calcium 8.9      Glucose 117 (*)     GFR Estimate 66     BLOOD GAS VENOUS - Abnormal    pH Venous 7.44 (*)     pCO2 Venous 42      pO2 Venous 44      Bicarbonate Venous 28      Base Excess/Deficit (+/-) 3.5 (*)     FIO2 4     ROUTINE UA WITH MICROSCOPIC REFLEX TO CULTURE - Abnormal    Color Urine Yellow      Appearance Urine Slightly Cloudy (*)     Glucose Urine Negative      Bilirubin Urine Negative      Ketones Urine Negative      Specific Gravity Urine 1.010      Blood Urine Trace (*)     pH Urine 7.5 (*)     Protein Albumin Urine Negative      Urobilinogen Urine Normal      Nitrite Urine Negative      Leukocyte Esterase Urine Moderate (*)     Bacteria Urine Few (*)     RBC Urine 2      WBC Urine 4      Squamous Epithelials Urine 2 (*)    INFLUENZA A/B & SARS-COV2 PCR MULTIPLEX - Abnormal    Influenza A PCR Negative      Influenza B PCR Negative      RSV PCR Negative      SARS CoV2 PCR Positive (*)    CBC WITH PLATELETS AND DIFFERENTIAL - Abnormal    WBC Count 8.3      RBC Count 4.37      Hemoglobin 13.1      Hematocrit 41.0      MCV 94      MCH 30.0      MCHC 32.0      RDW 13.3      Platelet Count 161      % Neutrophils 85      % Lymphocytes 7      % Monocytes 6      % Eosinophils 1      % Basophils 0      % Immature Granulocytes 1       NRBCs per 100 WBC 0      Absolute Neutrophils 7.2      Absolute Lymphocytes 0.6 (*)     Absolute Monocytes 0.5      Absolute Eosinophils 0.0      Absolute Basophils 0.0      Absolute Immature Granulocytes 0.0      Absolute NRBCs 0.0     GLUCOSE BY METER - Abnormal    GLUCOSE BY METER POCT 110 (*)    TROPONIN T, HIGH SENSITIVITY - Normal    Troponin T, High Sensitivity 12     NT PROBNP INPATIENT - Normal    N terminal Pro BNP Inpatient 188     LACTIC ACID WHOLE BLOOD - Normal    Lactic Acid 0.8     CK TOTAL - Normal    CK 62     BLOOD CULTURE   BLOOD CULTURE   URINE CULTURE      Emergency Department Course:       Reviewed:  I reviewed nursing notes, vitals, past medical history and Care Everywhere    Assessments:  1811 I obtained history and examined the patient as noted above.   1956 I rechecked the patient and explained findings.     Consults:  1831 I consulted with Stroke neurology about the patient and plan of care.  1859 I consulted with Stroke neurology about the patient and plan of care.  1904 I consulted with Radiology about the patient and plan of care.  1959 I consulted with Dr. Latham about the patient and plan of care.    Interventions:  2025 Tylenol 650 mg PO  2026 Decadron 6 mg IV    Disposition:  The patient was admitted to the hospital under the care of Dr. Latham.     Impression & Plan     CMS Diagnoses: None    Medical Decision Making:  Keisha Kolb is a 82-year-old female presenting to the ED for evaluation of a fall and altered mental status.  VS on presentation reveal T of 100.2 as well as hypoxia.  History, exam and ED course as outlined above.  Patient presenting with symptoms consistent with COVID.  Rapid testing returned positive.  Patient is vaccinated.  Chest x-ray without lobar infiltrate.  Given acute mental status change, positive Romberg on exam, tier 2 code stroke activated following my evaluation.  Fortunately, CT/CTA/CT perfusion negative for large vessel  occlusion, intracranial hemorrhage, or perfusion defect.  Note made of incidental sinus disease, which patient acknowledges to be relatively chronic and without acute change in symptoms.  Patient was provided Decadron in light of hypoxia.  We will plan for hospital admission under the care of Dr. Latham for further supportive treatment and care.  At this time, she will require a nonemergent MRI, though not appropriate candidate for IV or IA lytic therapy at this time.    Diagnosis:    ICD-10-CM    1. Fall, initial encounter  W19.XXXA       2. Altered mental status, unspecified altered mental status type  R41.82       3. Acute respiratory failure with hypoxia (H)  J96.01       4. Acute cough  R05.1           Scribe Disclosure:  I, Dionisio Ventura, am serving as a scribe at 6:07 PM on 10/23/2022 to document services personally performed by Lawrence Perez MD based on my observations and the provider's statements to me.        Lawrence Perez MD  10/23/22 5806

## 2022-10-23 NOTE — ED TRIAGE NOTES
Pt was found on the ground by her family. Pt's family estimates that the Pt was only on the ground for 30 minutes. Pt was last seen at 1400, ems was called at 1642. Pt is noted to be confused and requiring oxygen. Pt is normally not confused and is normally not on oxygen. Per EMS, Pt was sating at 88 % on room air.

## 2022-10-24 ENCOUNTER — APPOINTMENT (OUTPATIENT)
Dept: PHYSICAL THERAPY | Facility: CLINIC | Age: 82
DRG: 177 | End: 2022-10-24
Attending: INTERNAL MEDICINE
Payer: COMMERCIAL

## 2022-10-24 ENCOUNTER — APPOINTMENT (OUTPATIENT)
Dept: MRI IMAGING | Facility: CLINIC | Age: 82
DRG: 177 | End: 2022-10-24
Attending: INTERNAL MEDICINE
Payer: COMMERCIAL

## 2022-10-24 ENCOUNTER — APPOINTMENT (OUTPATIENT)
Dept: CT IMAGING | Facility: CLINIC | Age: 82
DRG: 177 | End: 2022-10-24
Attending: HOSPITALIST
Payer: COMMERCIAL

## 2022-10-24 ENCOUNTER — APPOINTMENT (OUTPATIENT)
Dept: CARDIOLOGY | Facility: CLINIC | Age: 82
DRG: 177 | End: 2022-10-24
Attending: INTERNAL MEDICINE
Payer: COMMERCIAL

## 2022-10-24 LAB
ALBUMIN SERPL BCG-MCNC: 3.9 G/DL (ref 3.5–5.2)
ALP SERPL-CCNC: 77 U/L (ref 35–104)
ALT SERPL W P-5'-P-CCNC: 15 U/L (ref 10–35)
AST SERPL W P-5'-P-CCNC: 19 U/L (ref 10–35)
ATRIAL RATE - MUSE: 76 BPM
BILIRUB DIRECT SERPL-MCNC: <0.2 MG/DL (ref 0–0.3)
BILIRUB SERPL-MCNC: 0.6 MG/DL
D DIMER PPP FEU-MCNC: 1.15 UG/ML FEU (ref 0–0.5)
DIASTOLIC BLOOD PRESSURE - MUSE: NORMAL MMHG
INTERPRETATION ECG - MUSE: NORMAL
LVEF ECHO: NORMAL
P AXIS - MUSE: 60 DEGREES
PR INTERVAL - MUSE: 160 MS
PROT SERPL-MCNC: 6.8 G/DL (ref 6.4–8.3)
QRS DURATION - MUSE: 124 MS
QT - MUSE: 428 MS
QTC - MUSE: 481 MS
R AXIS - MUSE: 65 DEGREES
SYSTOLIC BLOOD PRESSURE - MUSE: NORMAL MMHG
T AXIS - MUSE: 21 DEGREES
VENTRICULAR RATE- MUSE: 76 BPM

## 2022-10-24 PROCEDURE — 80076 HEPATIC FUNCTION PANEL: CPT | Performed by: INTERNAL MEDICINE

## 2022-10-24 PROCEDURE — 250N000013 HC RX MED GY IP 250 OP 250 PS 637: Performed by: HOSPITALIST

## 2022-10-24 PROCEDURE — 93321 DOPPLER ECHO F-UP/LMTD STD: CPT | Mod: 26 | Performed by: INTERNAL MEDICINE

## 2022-10-24 PROCEDURE — 93325 DOPPLER ECHO COLOR FLOW MAPG: CPT

## 2022-10-24 PROCEDURE — 36415 COLL VENOUS BLD VENIPUNCTURE: CPT | Performed by: INTERNAL MEDICINE

## 2022-10-24 PROCEDURE — XW033E5 INTRODUCTION OF REMDESIVIR ANTI-INFECTIVE INTO PERIPHERAL VEIN, PERCUTANEOUS APPROACH, NEW TECHNOLOGY GROUP 5: ICD-10-PCS | Performed by: HOSPITALIST

## 2022-10-24 PROCEDURE — 93308 TTE F-UP OR LMTD: CPT

## 2022-10-24 PROCEDURE — 250N000013 HC RX MED GY IP 250 OP 250 PS 637: Performed by: INTERNAL MEDICINE

## 2022-10-24 PROCEDURE — 258N000003 HC RX IP 258 OP 636: Performed by: INTERNAL MEDICINE

## 2022-10-24 PROCEDURE — 99233 SBSQ HOSP IP/OBS HIGH 50: CPT | Performed by: HOSPITALIST

## 2022-10-24 PROCEDURE — 93325 DOPPLER ECHO COLOR FLOW MAPG: CPT | Mod: 26 | Performed by: INTERNAL MEDICINE

## 2022-10-24 PROCEDURE — 97161 PT EVAL LOW COMPLEX 20 MIN: CPT | Mod: GP | Performed by: PHYSICAL THERAPIST

## 2022-10-24 PROCEDURE — 258N000003 HC RX IP 258 OP 636: Performed by: HOSPITALIST

## 2022-10-24 PROCEDURE — 70551 MRI BRAIN STEM W/O DYE: CPT

## 2022-10-24 PROCEDURE — 36415 COLL VENOUS BLD VENIPUNCTURE: CPT | Performed by: HOSPITALIST

## 2022-10-24 PROCEDURE — 85379 FIBRIN DEGRADATION QUANT: CPT | Performed by: HOSPITALIST

## 2022-10-24 PROCEDURE — 250N000011 HC RX IP 250 OP 636: Performed by: INTERNAL MEDICINE

## 2022-10-24 PROCEDURE — 93308 TTE F-UP OR LMTD: CPT | Mod: 26 | Performed by: INTERNAL MEDICINE

## 2022-10-24 PROCEDURE — 120N000001 HC R&B MED SURG/OB

## 2022-10-24 PROCEDURE — 250N000011 HC RX IP 250 OP 636: Performed by: HOSPITALIST

## 2022-10-24 PROCEDURE — 97530 THERAPEUTIC ACTIVITIES: CPT | Mod: GP | Performed by: PHYSICAL THERAPIST

## 2022-10-24 PROCEDURE — 71275 CT ANGIOGRAPHY CHEST: CPT

## 2022-10-24 RX ORDER — SIMVASTATIN 20 MG
20 TABLET ORAL AT BEDTIME
Status: DISCONTINUED | OUTPATIENT
Start: 2022-10-24 | End: 2022-10-26 | Stop reason: HOSPADM

## 2022-10-24 RX ORDER — CHOLECALCIFEROL (VITAMIN D3) 50 MCG
1 TABLET ORAL DAILY
COMMUNITY

## 2022-10-24 RX ORDER — FAMOTIDINE 20 MG/1
20 TABLET, FILM COATED ORAL PRN
COMMUNITY
End: 2023-11-20

## 2022-10-24 RX ORDER — IOPAMIDOL 755 MG/ML
500 INJECTION, SOLUTION INTRAVASCULAR ONCE
Status: COMPLETED | OUTPATIENT
Start: 2022-10-24 | End: 2022-10-24

## 2022-10-24 RX ADMIN — GUAIFENESIN 10 ML: 200 SOLUTION ORAL at 10:58

## 2022-10-24 RX ADMIN — SIMVASTATIN 20 MG: 20 TABLET, FILM COATED ORAL at 21:36

## 2022-10-24 RX ADMIN — IOPAMIDOL 61 ML: 755 INJECTION, SOLUTION INTRAVENOUS at 14:18

## 2022-10-24 RX ADMIN — CETIRIZINE HYDROCHLORIDE 10 MG: 10 TABLET, FILM COATED ORAL at 21:35

## 2022-10-24 RX ADMIN — ENOXAPARIN SODIUM 40 MG: 40 INJECTION SUBCUTANEOUS at 21:35

## 2022-10-24 RX ADMIN — REMDESIVIR 200 MG: 100 INJECTION, POWDER, LYOPHILIZED, FOR SOLUTION INTRAVENOUS at 12:35

## 2022-10-24 RX ADMIN — SODIUM CHLORIDE 80 ML: 9 INJECTION, SOLUTION INTRAVENOUS at 14:20

## 2022-10-24 RX ADMIN — ENOXAPARIN SODIUM 40 MG: 40 INJECTION SUBCUTANEOUS at 01:00

## 2022-10-24 RX ADMIN — DEXAMETHASONE 6 MG: 2 TABLET ORAL at 13:46

## 2022-10-24 RX ADMIN — SODIUM CHLORIDE 50 ML: 9 INJECTION, SOLUTION INTRAVENOUS at 12:39

## 2022-10-24 RX ADMIN — LORAZEPAM 0.5 MG: 0.5 TABLET ORAL at 14:48

## 2022-10-24 RX ADMIN — CETIRIZINE HYDROCHLORIDE 10 MG: 10 TABLET, FILM COATED ORAL at 10:44

## 2022-10-24 RX ADMIN — CITALOPRAM HYDROBROMIDE 40 MG: 20 TABLET ORAL at 21:36

## 2022-10-24 ASSESSMENT — ACTIVITIES OF DAILY LIVING (ADL)
ADLS_ACUITY_SCORE: 33
ADLS_ACUITY_SCORE: 35
ADLS_ACUITY_SCORE: 33
ADLS_ACUITY_SCORE: 35
ADLS_ACUITY_SCORE: 36
ADLS_ACUITY_SCORE: 36
ADLS_ACUITY_SCORE: 35
ADLS_ACUITY_SCORE: 36
ADLS_ACUITY_SCORE: 36

## 2022-10-24 NOTE — CONSULTS
Care Management Initial Consult    General Information  Assessment completed with: Other (granddaughter), Annika  Type of CM/SW Visit: Initial Assessment    Primary Care Provider verified and updated as needed: Yes   Readmission within the last 30 days: no previous admission in last 30 days      Reason for Consult: care coordination/care conference, discharge planning  Advance Care Planning:            Communication Assessment  Patient's communication style: spoken language (English or Bilingual)    Hearing Difficulty or Deaf: no   Wear Glasses or Blind: yes    Cognitive  Cognitive/Neuro/Behavioral: .WDL except  Level of Consciousness: alert, confused  Arousal Level: opens eyes spontaneously  Orientation: disoriented to, place, time  Mood/Behavior: calm, cooperative  Best Language: 0 - No aphasia  Speech: clear    Living Environment:   People in home: grandchild(may)     Current living Arrangements: house      Able to return to prior arrangements: yes       Family/Social Support:  Care provided by: self, other (see comments)  Provides care for:       Children (grandchildren)          Description of Support System: Supportive, Involved    Support Assessment: Adequate family and caregiver support    Current Resources:   Patient receiving home care services: No     Community Resources: None  Equipment currently used at home: cane, straight      Lifestyle & Psychosocial Needs:  Social Determinants of Health     Tobacco Use: Unknown     Smoking Tobacco Use: Never     Smokeless Tobacco Use: Unknown     Passive Exposure: Not on file   Alcohol Use: Not on file   Financial Resource Strain: Not on file   Food Insecurity: Not on file   Transportation Needs: Not on file   Physical Activity: Not on file   Stress: Not on file   Social Connections: Not on file   Intimate Partner Violence: Not on file   Depression: Not on file   Housing Stability: Not on file                 Additional Information:  CM consulted for discharge  planning for patient with covid. Per chart review, patient lives at home with granddaughter. Spoke to bedside RN who recommended CM call Granddaugther Annika as patient has some confusion. Call placed to Annika [phone], to discuss any home services and discharge planning. She verified that she and her family have moved in with patient to be her caregivers. Annika has a 2 yr old daughter and is home all day. She can provide 24/7 support to patient on discharge. She verified that patient does not have any in home services or home care. She states patient would be able to get PCA services through her insurance if needed but they have not needed that. She states patient is basically independent at home with activity and cares. Grand daughter cooks for her and does her meds. Patient has a cane for ambulation when she is out of home.     We reviewed plan of care and that Physical Therapy will see patient for discharge recommendations and based on that recommendation, CM will call back for further discharge discussions. Annika is agreeable to waiting for Physical Therapy recommendations and would like to be kept updated and in the loop.    Will cont to follow for discharge recommendations.                 Rosemary Mcfarlane RN BSN CM  Inpatient Care Coordination  [facility]  [phone]

## 2022-10-24 NOTE — PHARMACY-ADMISSION MEDICATION HISTORY
Admission medication history interview status for this patient is complete. See [facility] admission navigator for allergy information, prior to admission medications and immunization status.     Medication history interview done, indicate source(s): Family (Annika - granddaughter and caregiver)  Medication history resources (including written lists, pill bottles, clinic record):[employer] list, sure scripts  Pharmacy: [facility]    Changes made to PTA medication list:  Added: vitamin D 50mcg QAM, Pepcid 20mg - 1 tablet prn w/ zyrtec  Changed: none  Reported as Not Taking: tylenol, robitussin, atarax  Removed: none    Actions taken by pharmacist (provider contacted, etc):Contacted Annika ([phone])    Additional medication history information: Patient was admitted for hives recently and was treated with prednisone taper and antibiotics.    Medication reconciliation/reorder completed by provider prior to medication history?  N     For patients on insulin therapy: N    Prior to Admission medications    Medication Sig Last Dose Taking? Auth Provider Long Term End Date   cetirizine (ZYRTEC) 10 MG tablet Take 10 mg by mouth 2 times daily 10/23/2022 at am Yes Unknown, Entered By History     CITALOPRAM HYDROBROMIDE PO Take 40 mg by mouth At Bedtime 10/22/2022 at pm Yes Reported, Patient Yes    famotidine (PEPCID) 20 MG tablet Take 20 mg by mouth as needed 1 tablet daily prn with zyrtec  Yes Unknown, Entered By History     LORAZEPAM PO Take 1 mg by mouth every morning 10/23/2022 at am Yes Reported, Patient     SIMVASTATIN PO Take 20 mg by mouth At Bedtime 10/22/2022 at pm Yes Reported, Patient Yes    vitamin D3 (CHOLECALCIFEROL) 50 mcg (2000 units) tablet Take 1 tablet by mouth daily 10/23/2022 at am Yes Unknown, Entered By History     acetaminophen (TYLENOL) 325 MG tablet Take 2 tablets (650 mg) by mouth every 6 hours as needed for mild pain or other (and adjunct with moderate or severe pain or per patient  request)  Patient not taking: Reported on 10/24/2022 Not Taking  Juliana Solorio PA-C     EPINEPHrine (ANY BX GENERIC EQUIV) 0.3 MG/0.3ML injection 2-pack Inject 0.3 mLs (0.3 mg) into the muscle once as needed for anaphylaxis May repeat one time in 5-15 minutes if response to initial dose is inadequate.   Eddy Love MD     guaiFENesin-dextromethorphan (ROBITUSSIN DM) 100-10 MG/5ML syrup Take 10 mLs by mouth every 4 hours as needed for cough  Patient not taking: Reported on 10/24/2022 Not Taking  Juliana Solorio PA-C     hydrOXYzine (ATARAX) 25 MG tablet Take 1 tablet (25 mg) by mouth every 6 hours as needed for itching  Patient not taking: Reported on 10/24/2022 Not Taking  Eddy Love MD     triamcinolone (KENALOG) 0.1 % external cream Apply topically 2 times daily as needed for irritation (itchiness - don't place over open wounds)   Eddy Love MD

## 2022-10-24 NOTE — H&P
[facility]  Hospitalist Admission Note  Name: Keisha Kolb    MRN: [id number]  YOB: 1940    Age: 82 year old  Date of admission: 10/23/2022  Primary care provider: [facility] [place]    Chief Complaint:  covid-19, hypoxia    Keisha Kolb is a 82 year old female with PMH including anxiety who presents with a fall episode and altered mental status. she was normal around 2 PM though has had a cough that has been present for couple of days.  However, she was later found down on the ground at around 4:45 PM and she doesn't recall how she got there.  She has had subjective fevers and some nausea.  Her granddaughter noticed that she was confused and had some slurred speech so called EMS.  She was ultimately able to walk down the stairs to EMS.  On arrival her oxygen saturation was 88% on room air.  She was brought into the emergency room.    Here she continued to require supplemental oxygen.  She had a temp of 100.2.  Lab work-up including CBC, BMP and urine analysis were relatively unrevealing but she did test positive for COVID-19.  Stroke neurology was consulted as she was noted to have a positive Romberg sign and she had CT head and CTA head and neck which were negative.  Nonemergent MRI was recommended for completion of her stroke evaluation.  She is being started on Decadron and I am asked to admit her for further care.      Assessment and Plan:   1. Covid-19 with acute hypoxemic respiratory failure:   -- Admit under inpatient status  --Continue Decadron 6 mg for total of 10 days  --Check LFTs, if normal will start remdesivir.  I already discussed this with the patient and she is open to it.  --Lovenox for DVT prophylaxis    2.   Generalized weakness, falls, possible syncope: Suspect secondary to #1.  CT head and CTA head and neck as part of code stroke negative.  We will check an MRI for completeness given that she has some instability on exam.  Otherwise work-up has  been unrevealing of acute pathology.  UA negative for infection, chest x-ray negative for focal infiltrates and no other major metabolic issue identified.  Can't fully rule out seizure or other cause of syncope.  --PT and social work consults  --Check MRI brain for completeness  --monitor on tele x 24 hours, check an echo for completeness  -- Reduce home 1 mg daily Ativan dose to 0.5 mg daily as needed    3.   Toxic metabolic encephalopathy: Most likely related to COVID-19 and hypoxemic respiratory failure.  Checking MRI brain.    4.   Hx of anxiety:   --Resume her home citalopram.    --She apparently takes 1 mg of Ativan daily.  Will reduce this dose to 0.5 mg daily as needed for now at least until memory/mental status completely normalizes.    5.  History of hives: We will resume her home antihistamines.      DVT Prophylaxis: Enoxaparin (Lovenox) SQ  Code Status: DNR  Discharge Dispo: admit under inpatient status      History of Present Illness:  Keisha Kolb is a 82 year old female with PMH including anxiety who presents with a fall episode and altered mental status.  She was brought in with her granddaughter who resides with her and is a caregiver.  She provides much of the history and seems to have a good grasp of her medications etc.    They report that she was normal around 2 PM and had a cough that has been present for couple of days but then she was found down on the ground at around 4:45 PM.  She has had subjective fevers and some nausea.  Her granddaughter noticed that she was confused and had some slurred speech so called EMS.  She was ultimately able to walk down the stairs to EMS.  On arrival her oxygen saturation was 88% on room air.  She was brought into the emergency room.    Here she continued to require supplemental oxygen.  She had a temp of 100.2.  Lab work-up including CBC, BMP and urine analysis were relatively unrevealing but she did test positive for COVID-19.  Stroke neurology was  consulted as she was noted to have a positive Romberg sign and she had CT head and CTA head and neck which were negative.  Nonemergent MRI was recommended for completion of her stroke evaluation.  She is being started on Decadron and I am asked to admit her for further care.     Past Medical History:  Past Medical History:   Diagnosis Date     Anxiety      Past Surgical History:  Past Surgical History:   Procedure Laterality Date     masectomy       Social History:  Social History     Tobacco Use     Smoking status: Never     Smokeless tobacco: Not on file   Substance Use Topics     Alcohol use: No     Social History     Social History Narrative     Not on file     Family History:  Non-contributory    Allergies:  No Known Allergies  Medications:  No current facility-administered medications on file prior to encounter.  acetaminophen (TYLENOL) 325 MG tablet, Take 2 tablets (650 mg) by mouth every 6 hours as needed for mild pain or other (and adjunct with moderate or severe pain or per patient request)  cetirizine (ZYRTEC) 10 MG tablet, Take 10 mg by mouth 2 times daily  CITALOPRAM HYDROBROMIDE PO, Take 40 mg by mouth At Bedtime  EPINEPHrine (ANY BX GENERIC EQUIV) 0.3 MG/0.3ML injection 2-pack, Inject 0.3 mLs (0.3 mg) into the muscle once as needed for anaphylaxis May repeat one time in 5-15 minutes if response to initial dose is inadequate.  guaiFENesin-dextromethorphan (ROBITUSSIN DM) 100-10 MG/5ML syrup, Take 10 mLs by mouth every 4 hours as needed for cough  hydrOXYzine (ATARAX) 25 MG tablet, Take 1 tablet (25 mg) by mouth every 6 hours as needed for itching  LORAZEPAM PO, Take 1 mg by mouth every morning  SIMVASTATIN PO, Take 20 mg by mouth At Bedtime  triamcinolone (KENALOG) 0.1 % external cream, Apply topically 2 times daily as needed for irritation (itchiness - don't place over open wounds)        Review of Systems:  A Comprehensive greater than 10 system review of systems was carried out.  Pertinent positives  and negatives are noted above.  Otherwise negative for contributory information.     Physical Exam:  Blood pressure (!) 140/75, pulse 83, temperature 100.2  F (37.9  C), resp. rate 20, SpO2 96 %.  Wt Readings from Last 1 Encounters:   08/07/22 84.2 kg (185 lb 9.6 oz)     Exam:  General: Alert, awake, no acute distress.  Seems fully oriented.  HEENT: NC/AT, eyes anicteric, external occular movements intact, face symmetric.   Cardiac: RRR, S1, S2.  No murmurs appreciated.  Pulmonary: Normal chest rise, normal work of breathing.  Lungs CTA BL  Abdomen: soft, non-tender, non-distended.  Bowel Sounds Present.  No guarding.  Extremities: no deformities.  Warm, well perfused.  Skin: no rashes or lesions noted.  Warm and Dry.  Neuro: No focal deficits noted.  Speech clear.  Coordination and strength grossly normal.  Psych: Appropriate affect.    Data:  EKG: Sinus rhythm with right bundle branch block  Imaging:  Results for orders placed or performed during the hospital encounter of 10/23/22   XR Chest 2 Views    Narrative    EXAM: XR CHEST 2 VIEWS  LOCATION: [facility]  DATE/TIME: 10/23/2022 7:35 PM    INDICATION: Shortness of breath and hypoxia.  COMPARISON: None.      Impression    IMPRESSION: Normal heart size and pulmonary vascularity. No focal alveolar infiltrate, consolidation or significant pleural fluid. No pneumothorax. Tortuous and ectatic calcified thoracic aorta. Degenerative changes in the spine and shoulders.   CT Head w/o Contrast    Narrative    EXAM: CT HEAD PERFUSION W CONTRAST, CTA HEAD NECK W CONTRAST, CT HEAD W/O CONTRAST  LOCATION: [facility]  DATE/TIME: 10/23/2022 6:56 PM    INDICATION: Code stroke  COMPARISON: MRI brain 06/06/2022  TECHNIQUE: Head and neck CT angiogram with IV contrast. Noncontrast head CT followed by axial helical CT images of the head and neck vessels obtained during the arterial phase of intravenous contrast administration. Axial 2D  reconstructed images and   multiplanar 3D MIP reconstructed images of the head and neck vessels were performed by the technologist. Additional CT cerebral perfusion was performed utilizing a second contrast bolus. Perfusion data were post processed with generation of standard   perfusion maps and estimation of ischemic/infarcted volumes utilizing standard threshold values. Dose reduction techniques were used. All stenosis measurements made according to NASCET criteria unless otherwise specified.  CONTRAST: 120mL Isovue 370 (accession [id number]), 50mL Isovue 370 (accession [id number]), 50mL Isovue 370 (accession [id number])  COMPARISON: MRI brain 06/06/2022    FINDINGS:   NONCONTRAST HEAD CT:   INTRACRANIAL CONTENTS: No intracranial hemorrhage, extraaxial collection, or mass effect.  No CT evidence of acute infarct. Mild presumed chronic small vessel ischemic changes. Moderate generalized volume loss. No hydrocephalus.     VISUALIZED ORBITS/SINUSES/MASTOIDS: Prior bilateral cataract surgery. Visualized portions of the orbits are otherwise unremarkable. No paranasal sinus mucosal disease. No middle ear or mastoid effusion.    BONES/SOFT TISSUES: No acute abnormality.    HEAD CTA:  ANTERIOR CIRCULATION: No stenosis/occlusion, aneurysm, or high flow vascular malformation. There is nonstenotic atherosclerotic calcification of the bilateral carotid siphons. Fetal origin of the left posterior cerebral artery from the anterior   circulation.    POSTERIOR CIRCULATION: No stenosis/occlusion, aneurysm, or high flow vascular malformation. Balanced vertebral arteries supply a normal basilar artery.     DURAL VENOUS SINUSES: Not well evaluated on a technical basis.    NECK CTA:  RIGHT CAROTID: No measurable stenosis or dissection.    LEFT CAROTID: No measurable stenosis or dissection.    VERTEBRAL ARTERIES: No focal high-grade stenosis or dissection. There are mild to moderate narrowings of the bilateral vertebral arteries at the  C3-C4 level secondary to severe facet hypertrophy and uncovertebral hypertrophy at this levels. Balanced   vertebral arteries.    AORTIC ARCH: Classic aortic arch anatomy with no significant stenosis at the origin of the great vessels.    NONVASCULAR STRUCTURES: Unremarkable.    CT PERFUSION:  PERFUSION MAPS: Symmetrical cerebral perfusion. No focal deficits in cerebral blood flow or volume to suggest ischemia/oligemia.    RAPID ANALYSIS:  CBF<30%: 0  Tmax>6sec: 0  Mismatch volume: 0  Mismatch ratio: 0      Impression    IMPRESSION:   HEAD CT:  1.  No CT evidence for acute intracranial process.  2.  Brain atrophy and presumed chronic microvascular ischemic changes as above.  3.  Left frontal and ethmoid sinusitis.    HEAD CTA:   1.  Normal CTA [place].    NECK CTA:  1.  No high-grade carotid or vertebral artery stenosis, occlusion, or dissection.  2.  Mild to moderate stenoses of the bilateral vertebral arteries at the C3-C4 level secondary to severe facet and uncovertebral hypertrophy at this level.    CT PERFUSION:  1.  Normal cerebral perfusion.    2.  I discussed the negatives stroke findings with Dr. Perez at 7:05 PM 10/23/2022   CT Head Perfusion w Contrast    Narrative    EXAM: CT HEAD PERFUSION W CONTRAST, CTA HEAD NECK W CONTRAST, CT HEAD W/O CONTRAST  LOCATION: [facility]  DATE/TIME: 10/23/2022 6:56 PM    INDICATION: Code stroke  COMPARISON: MRI brain 06/06/2022  TECHNIQUE: Head and neck CT angiogram with IV contrast. Noncontrast head CT followed by axial helical CT images of the head and neck vessels obtained during the arterial phase of intravenous contrast administration. Axial 2D reconstructed images and   multiplanar 3D MIP reconstructed images of the head and neck vessels were performed by the technologist. Additional CT cerebral perfusion was performed utilizing a second contrast bolus. Perfusion data were post processed with generation of standard   perfusion maps  and estimation of ischemic/infarcted volumes utilizing standard threshold values. Dose reduction techniques were used. All stenosis measurements made according to NASCET criteria unless otherwise specified.  CONTRAST: 120mL Isovue 370 (accession [id number]), 50mL Isovue 370 (accession [id number]), 50mL Isovue 370 (accession [id number])  COMPARISON: MRI brain 06/06/2022    FINDINGS:   NONCONTRAST HEAD CT:   INTRACRANIAL CONTENTS: No intracranial hemorrhage, extraaxial collection, or mass effect.  No CT evidence of acute infarct. Mild presumed chronic small vessel ischemic changes. Moderate generalized volume loss. No hydrocephalus.     VISUALIZED ORBITS/SINUSES/MASTOIDS: Prior bilateral cataract surgery. Visualized portions of the orbits are otherwise unremarkable. No paranasal sinus mucosal disease. No middle ear or mastoid effusion.    BONES/SOFT TISSUES: No acute abnormality.    HEAD CTA:  ANTERIOR CIRCULATION: No stenosis/occlusion, aneurysm, or high flow vascular malformation. There is nonstenotic atherosclerotic calcification of the bilateral carotid siphons. Fetal origin of the left posterior cerebral artery from the anterior   circulation.    POSTERIOR CIRCULATION: No stenosis/occlusion, aneurysm, or high flow vascular malformation. Balanced vertebral arteries supply a normal basilar artery.     DURAL VENOUS SINUSES: Not well evaluated on a technical basis.    NECK CTA:  RIGHT CAROTID: No measurable stenosis or dissection.    LEFT CAROTID: No measurable stenosis or dissection.    VERTEBRAL ARTERIES: No focal high-grade stenosis or dissection. There are mild to moderate narrowings of the bilateral vertebral arteries at the C3-C4 level secondary to severe facet hypertrophy and uncovertebral hypertrophy at this levels. Balanced   vertebral arteries.    AORTIC ARCH: Classic aortic arch anatomy with no significant stenosis at the origin of the great vessels.    NONVASCULAR STRUCTURES: Unremarkable.    CT  PERFUSION:  PERFUSION MAPS: Symmetrical cerebral perfusion. No focal deficits in cerebral blood flow or volume to suggest ischemia/oligemia.    RAPID ANALYSIS:  CBF<30%: 0  Tmax>6sec: 0  Mismatch volume: 0  Mismatch ratio: 0      Impression    IMPRESSION:   HEAD CT:  1.  No CT evidence for acute intracranial process.  2.  Brain atrophy and presumed chronic microvascular ischemic changes as above.  3.  Left frontal and ethmoid sinusitis.    HEAD CTA:   1.  Normal CTA [place].    NECK CTA:  1.  No high-grade carotid or vertebral artery stenosis, occlusion, or dissection.  2.  Mild to moderate stenoses of the bilateral vertebral arteries at the C3-C4 level secondary to severe facet and uncovertebral hypertrophy at this level.    CT PERFUSION:  1.  Normal cerebral perfusion.    2.  I discussed the negatives stroke findings with Dr. Perez at 7:05 PM 10/23/2022   CTA Head Neck w Contrast    Narrative    EXAM: CT HEAD PERFUSION W CONTRAST, CTA HEAD NECK W CONTRAST, CT HEAD W/O CONTRAST  LOCATION: [facility]  DATE/TIME: 10/23/2022 6:56 PM    INDICATION: Code stroke  COMPARISON: MRI brain 06/06/2022  TECHNIQUE: Head and neck CT angiogram with IV contrast. Noncontrast head CT followed by axial helical CT images of the head and neck vessels obtained during the arterial phase of intravenous contrast administration. Axial 2D reconstructed images and   multiplanar 3D MIP reconstructed images of the head and neck vessels were performed by the technologist. Additional CT cerebral perfusion was performed utilizing a second contrast bolus. Perfusion data were post processed with generation of standard   perfusion maps and estimation of ischemic/infarcted volumes utilizing standard threshold values. Dose reduction techniques were used. All stenosis measurements made according to NASCET criteria unless otherwise specified.  CONTRAST: 120mL Isovue 370 (accession [id number]), 50mL Isovue 370 (accession  [id number]), 50mL Isovue 370 (accession [id number])  COMPARISON: MRI brain 06/06/2022    FINDINGS:   NONCONTRAST HEAD CT:   INTRACRANIAL CONTENTS: No intracranial hemorrhage, extraaxial collection, or mass effect.  No CT evidence of acute infarct. Mild presumed chronic small vessel ischemic changes. Moderate generalized volume loss. No hydrocephalus.     VISUALIZED ORBITS/SINUSES/MASTOIDS: Prior bilateral cataract surgery. Visualized portions of the orbits are otherwise unremarkable. No paranasal sinus mucosal disease. No middle ear or mastoid effusion.    BONES/SOFT TISSUES: No acute abnormality.    HEAD CTA:  ANTERIOR CIRCULATION: No stenosis/occlusion, aneurysm, or high flow vascular malformation. There is nonstenotic atherosclerotic calcification of the bilateral carotid siphons. Fetal origin of the left posterior cerebral artery from the anterior   circulation.    POSTERIOR CIRCULATION: No stenosis/occlusion, aneurysm, or high flow vascular malformation. Balanced vertebral arteries supply a normal basilar artery.     DURAL VENOUS SINUSES: Not well evaluated on a technical basis.    NECK CTA:  RIGHT CAROTID: No measurable stenosis or dissection.    LEFT CAROTID: No measurable stenosis or dissection.    VERTEBRAL ARTERIES: No focal high-grade stenosis or dissection. There are mild to moderate narrowings of the bilateral vertebral arteries at the C3-C4 level secondary to severe facet hypertrophy and uncovertebral hypertrophy at this levels. Balanced   vertebral arteries.    AORTIC ARCH: Classic aortic arch anatomy with no significant stenosis at the origin of the great vessels.    NONVASCULAR STRUCTURES: Unremarkable.    CT PERFUSION:  PERFUSION MAPS: Symmetrical cerebral perfusion. No focal deficits in cerebral blood flow or volume to suggest ischemia/oligemia.    RAPID ANALYSIS:  CBF<30%: 0  Tmax>6sec: 0  Mismatch volume: 0  Mismatch ratio: 0      Impression    IMPRESSION:   HEAD CT:  1.  No CT evidence for acute  intracranial process.  2.  Brain atrophy and presumed chronic microvascular ischemic changes as above.  3.  Left frontal and ethmoid sinusitis.    HEAD CTA:   1.  Normal CTA [place].    NECK CTA:  1.  No high-grade carotid or vertebral artery stenosis, occlusion, or dissection.  2.  Mild to moderate stenoses of the bilateral vertebral arteries at the C3-C4 level secondary to severe facet and uncovertebral hypertrophy at this level.    CT PERFUSION:  1.  Normal cerebral perfusion.    2.  I discussed the negatives stroke findings with Dr. Perez at 7:05 PM 10/23/2022     Labs:  Recent Labs   Lab 10/23/22  1750   WBC 8.3   HGB 13.1   HCT 41.0   MCV 94             Lab Results   Component Value Date     10/23/2022     09/11/2022     08/09/2022    Lab Results   Component Value Date    CHLORIDE 100 10/23/2022    CHLORIDE 103 09/11/2022    CHLORIDE 105 08/09/2022    Lab Results   Component Value Date    BUN 9.0 10/23/2022    BUN 14.3 09/11/2022    BUN 18.9 08/09/2022      Lab Results   Component Value Date    POTASSIUM 4.0 10/23/2022    POTASSIUM 3.3 09/11/2022    POTASSIUM 4.2 08/09/2022    Lab Results   Component Value Date    CO2 26 10/23/2022    CO2 29 09/11/2022    CO2 26 08/09/2022    Lab Results   Component Value Date    CR 0.87 10/23/2022    CR 0.88 09/11/2022    CR 0.82 08/09/2022        7-Day Micro Results     Collected Updated Procedure Result Status      10/23/2022 1817 10/23/2022 1840 Blood Culture Arm, Left [[id number]]   Blood from Arm, Left    In process Component Value   No component results               10/23/2022 1816 10/23/2022 1840 Blood Culture Arm, Left [[id number]]   Blood from Arm, Left    In process Component Value   No component results               10/23/2022 1809 10/23/2022 1915 Symptomatic; Unknown Influenza A/B & SARS-CoV2 (COVID-19) Virus PCR Multiplex Nasopharyngeal [[id number]]    (Abnormal)   Swab from Nasopharyngeal    Final result Component Value    Influenza A PCR Negative   Influenza B PCR Negative   RSV PCR Negative   SARS CoV2 PCR Positive   POSITIVE: SARS-CoV-2 (COVID-19) RNA detected, presumed positive.            10/23/2022 1808 10/23/2022 1909 Urine Culture [[id number]]   Urine, Midstream    In process Component Value   No component results                       Atul Latham MD  Hospitalist  [facility]

## 2022-10-24 NOTE — PROGRESS NOTES
[facility]    Hospitalist Progress Note  Name: Keisha Kolb    MRN: [id number]  Provider:  Brennan Villanueva DO MPH  Date of Service: 10/24/2022    Summary of Stay: Keisha Kolb is a 82 year old female with PMH including anxiety who presents with a fall episode and altered mental status. she was normal around 2 PM though has had a cough that has been present for couple of days.  However, she was later found down on the ground at around 4:45 PM and she doesn't recall how she got there.  She has had subjective fevers and some nausea.  Her granddaughter noticed that she was confused and had some slurred speech so called EMS.  She was ultimately able to walk down the stairs to EMS.  On arrival her oxygen saturation was 88% on room air.  She was brought into the emergency room.     Here she continued to require supplemental oxygen.  She had a temp of 100.2.  Lab work-up including CBC, BMP and urine analysis were relatively unrevealing but she did test positive for COVID-19.  Stroke neurology was consulted as she was noted to have a positive Romberg sign and she had CT head and CTA head and neck which were negative.  Nonemergent MRI was recommended for completion of her stroke evaluation.  She is being started on Decadron and admitted.     Assessment and Plan:   Covid-19 with acute hypoxemic respiratory failure: Continues to require about 3L O2 by NC.  Feeling slightly better this morning.  She is fully vaccinated and boosted against COVID infection.  Her granddaughter who she lives with is also ill with COVID.  CXR shows no infiltrate.  --Continue Decadron 6 mg for total of 10 days.  --Will start remdesivir given stable creatinine and LFTs, she is agreeable.  --Lovenox for DVT prophylaxis.  --Check d-dimer as requiring oxygen without infiltrates seen on CXR, consider CT PE protocol.  No obvious RV abnormalities on TTE.     Generalized weakness, falls, possible syncope: Suspect secondary to #1.   CT head and CTA head and neck as part of code stroke negative.  We will check an MRI for completeness given that she has some instability on exam.  Otherwise work-up has been unrevealing of acute pathology.  UA negative for infection, chest x-ray negative for focal infiltrates and no other major metabolic issue identified.  Can't fully rule out seizure or other cause of syncope.  --PT and social work consults.  --Check MRI brain for completeness.  --Monitor on tele x 24 hours.  --TTE grossly normal.  --Reduce home 1 mg daily Ativan dose to 0.5 mg daily as needed.     Toxic metabolic encephalopathy: Most likely related to COVID-19 and hypoxemic respiratory failure.  Checking MRI brain.  Mental status baseline today.     History of anxiety:   --Resumed her home citalopram.    --She apparently takes 1 mg of Ativan daily.  Will reduce this dose to 0.5 mg daily as needed for now.     History of hives: We will resume her home antihistamines.    DVT Prophylaxis: Enoxaparin (Lovenox) SQ  Code Status: No CPR- Pre-arrest intubation OK  Diet: Combination Diet Regular Diet Adult    Izaguirre Catheter: Not present  Disposition: Expected discharge in 1-2 days to home. Goals prior to discharge include manage respiratory failure.   Incidental Findings: As above.  Family updated today: Yes son by phone.     Interval History   Assumed care from previous hospitalist. The history was fully reviewed.  The patient reports doing well. No confusion. Still unsteady on feet. No chest pain or shortness of breath. No nausea, vomiting, diarrhea, constipation. No fevers. No other specific complaints identified.     -Data reviewed today: I personally reviewed all new labs and imaging results over the last 24 hours.     Physical Exam   Temp: 97.8  F (36.6  C) Temp src: Oral BP: (!) 148/74 Pulse: 72   Resp: 20 SpO2: 94 % O2 Device: Nasal cannula Oxygen Delivery: 3 LPM  Vitals:    10/23/22 2210   Weight: 78.6 kg (173 lb 4.8 oz)     Vital Signs with  Ranges  Temp:  [97.5  F (36.4  C)-100.2  F (37.9  C)] 97.8  F (36.6  C)  Pulse:  [72-83] 72  Resp:  [20-25] 20  BP: (130-152)/(70-75) 148/74  SpO2:  [94 %-96 %] 94 %  No intake/output data recorded.    GENERAL: No apparent distress. Awake, alert, and fully oriented.  HEENT: Normocephalic, atraumatic. Extraocular movements intact.  CARDIOVASCULAR: Regular rate and rhythm without murmurs or rubs. No S3.  PULMONARY: Clear bilaterally.  GASTROINTESTINAL: Soft, non-tender, non-distended. Bowel sounds normoactive.   EXTREMITIES: No cyanosis or clubbing. No edema.  NEUROLOGICAL: CN 2-12 grossly intact, no focal neurological deficits.  DERMATOLOGICAL: No rash, ulcer, bruising, nor jaundice.     Medications       acetaminophen  650 mg Rectal Once     acetaminophen  325 mg Oral Once     cetirizine  10 mg Oral BID     citalopram  40 mg Oral At Bedtime     dexamethasone  6 mg Oral Daily     enoxaparin ANTICOAGULANT  40 mg Subcutaneous Q24H     sodium chloride (PF)  3 mL Intracatheter Q8H     Data     Laboratory:  Recent Labs   Lab 10/23/22  1750   WBC 8.3   HGB 13.1   HCT 41.0   MCV 94        Recent Labs   Lab 10/23/22  2222 10/23/22  1831 10/23/22  1750   NA  --   --  136   POTASSIUM  --   --  4.0   CHLORIDE  --   --  100   CO2  --   --  26   ANIONGAP  --   --  10   GLC  --  110* 117*   BUN  --   --  9.0   CR 0.87  --  0.87   GFRESTIMATED 66  --  66   KRISTEL  --   --  8.9     No results for input(s): CULT in the last 168 hours.    Imaging:  Recent Results (from the past 24 hour(s))   CT Head w/o Contrast    Narrative    EXAM: CT HEAD PERFUSION W CONTRAST, CTA HEAD NECK W CONTRAST, CT HEAD W/O CONTRAST  LOCATION: [facility]  DATE/TIME: 10/23/2022 6:56 PM    INDICATION: Code stroke  COMPARISON: MRI brain 06/06/2022  TECHNIQUE: Head and neck CT angiogram with IV contrast. Noncontrast head CT followed by axial helical CT images of the head and neck vessels obtained during the arterial phase of  intravenous contrast administration. Axial 2D reconstructed images and   multiplanar 3D MIP reconstructed images of the head and neck vessels were performed by the technologist. Additional CT cerebral perfusion was performed utilizing a second contrast bolus. Perfusion data were post processed with generation of standard   perfusion maps and estimation of ischemic/infarcted volumes utilizing standard threshold values. Dose reduction techniques were used. All stenosis measurements made according to NASCET criteria unless otherwise specified.  CONTRAST: 120mL Isovue 370 (accession [id number]), 50mL Isovue 370 (accession [id number]), 50mL Isovue 370 (accession [id number])  COMPARISON: MRI brain 06/06/2022    FINDINGS:   NONCONTRAST HEAD CT:   INTRACRANIAL CONTENTS: No intracranial hemorrhage, extraaxial collection, or mass effect.  No CT evidence of acute infarct. Mild presumed chronic small vessel ischemic changes. Moderate generalized volume loss. No hydrocephalus.     VISUALIZED ORBITS/SINUSES/MASTOIDS: Prior bilateral cataract surgery. Visualized portions of the orbits are otherwise unremarkable. No paranasal sinus mucosal disease. No middle ear or mastoid effusion.    BONES/SOFT TISSUES: No acute abnormality.    HEAD CTA:  ANTERIOR CIRCULATION: No stenosis/occlusion, aneurysm, or high flow vascular malformation. There is nonstenotic atherosclerotic calcification of the bilateral carotid siphons. Fetal origin of the left posterior cerebral artery from the anterior   circulation.    POSTERIOR CIRCULATION: No stenosis/occlusion, aneurysm, or high flow vascular malformation. Balanced vertebral arteries supply a normal basilar artery.     DURAL VENOUS SINUSES: Not well evaluated on a technical basis.    NECK CTA:  RIGHT CAROTID: No measurable stenosis or dissection.    LEFT CAROTID: No measurable stenosis or dissection.    VERTEBRAL ARTERIES: No focal high-grade stenosis or dissection. There are mild to moderate  narrowings of the bilateral vertebral arteries at the C3-C4 level secondary to severe facet hypertrophy and uncovertebral hypertrophy at this levels. Balanced   vertebral arteries.    AORTIC ARCH: Classic aortic arch anatomy with no significant stenosis at the origin of the great vessels.    NONVASCULAR STRUCTURES: Unremarkable.    CT PERFUSION:  PERFUSION MAPS: Symmetrical cerebral perfusion. No focal deficits in cerebral blood flow or volume to suggest ischemia/oligemia.    RAPID ANALYSIS:  CBF<30%: 0  Tmax>6sec: 0  Mismatch volume: 0  Mismatch ratio: 0      Impression    IMPRESSION:   HEAD CT:  1.  No CT evidence for acute intracranial process.  2.  Brain atrophy and presumed chronic microvascular ischemic changes as above.  3.  Left frontal and ethmoid sinusitis.    HEAD CTA:   1.  Normal CTA [place].    NECK CTA:  1.  No high-grade carotid or vertebral artery stenosis, occlusion, or dissection.  2.  Mild to moderate stenoses of the bilateral vertebral arteries at the C3-C4 level secondary to severe facet and uncovertebral hypertrophy at this level.    CT PERFUSION:  1.  Normal cerebral perfusion.    2.  I discussed the negatives stroke findings with Dr. Perez at 7:05 PM 10/23/2022   CTA Head Neck w Contrast    Narrative    EXAM: CT HEAD PERFUSION W CONTRAST, CTA HEAD NECK W CONTRAST, CT HEAD W/O CONTRAST  LOCATION: [facility]  DATE/TIME: 10/23/2022 6:56 PM    INDICATION: Code stroke  COMPARISON: MRI brain 06/06/2022  TECHNIQUE: Head and neck CT angiogram with IV contrast. Noncontrast head CT followed by axial helical CT images of the head and neck vessels obtained during the arterial phase of intravenous contrast administration. Axial 2D reconstructed images and   multiplanar 3D MIP reconstructed images of the head and neck vessels were performed by the technologist. Additional CT cerebral perfusion was performed utilizing a second contrast bolus. Perfusion data were post  processed with generation of standard   perfusion maps and estimation of ischemic/infarcted volumes utilizing standard threshold values. Dose reduction techniques were used. All stenosis measurements made according to NASCET criteria unless otherwise specified.  CONTRAST: 120mL Isovue 370 (accession [id number]), 50mL Isovue 370 (accession [id number]), 50mL Isovue 370 (accession [id number])  COMPARISON: MRI brain 06/06/2022    FINDINGS:   NONCONTRAST HEAD CT:   INTRACRANIAL CONTENTS: No intracranial hemorrhage, extraaxial collection, or mass effect.  No CT evidence of acute infarct. Mild presumed chronic small vessel ischemic changes. Moderate generalized volume loss. No hydrocephalus.     VISUALIZED ORBITS/SINUSES/MASTOIDS: Prior bilateral cataract surgery. Visualized portions of the orbits are otherwise unremarkable. No paranasal sinus mucosal disease. No middle ear or mastoid effusion.    BONES/SOFT TISSUES: No acute abnormality.    HEAD CTA:  ANTERIOR CIRCULATION: No stenosis/occlusion, aneurysm, or high flow vascular malformation. There is nonstenotic atherosclerotic calcification of the bilateral carotid siphons. Fetal origin of the left posterior cerebral artery from the anterior   circulation.    POSTERIOR CIRCULATION: No stenosis/occlusion, aneurysm, or high flow vascular malformation. Balanced vertebral arteries supply a normal basilar artery.     DURAL VENOUS SINUSES: Not well evaluated on a technical basis.    NECK CTA:  RIGHT CAROTID: No measurable stenosis or dissection.    LEFT CAROTID: No measurable stenosis or dissection.    VERTEBRAL ARTERIES: No focal high-grade stenosis or dissection. There are mild to moderate narrowings of the bilateral vertebral arteries at the C3-C4 level secondary to severe facet hypertrophy and uncovertebral hypertrophy at this levels. Balanced   vertebral arteries.    AORTIC ARCH: Classic aortic arch anatomy with no significant stenosis at the origin of the great  vessels.    NONVASCULAR STRUCTURES: Unremarkable.    CT PERFUSION:  PERFUSION MAPS: Symmetrical cerebral perfusion. No focal deficits in cerebral blood flow or volume to suggest ischemia/oligemia.    RAPID ANALYSIS:  CBF<30%: 0  Tmax>6sec: 0  Mismatch volume: 0  Mismatch ratio: 0      Impression    IMPRESSION:   HEAD CT:  1.  No CT evidence for acute intracranial process.  2.  Brain atrophy and presumed chronic microvascular ischemic changes as above.  3.  Left frontal and ethmoid sinusitis.    HEAD CTA:   1.  Normal CTA [place].    NECK CTA:  1.  No high-grade carotid or vertebral artery stenosis, occlusion, or dissection.  2.  Mild to moderate stenoses of the bilateral vertebral arteries at the C3-C4 level secondary to severe facet and uncovertebral hypertrophy at this level.    CT PERFUSION:  1.  Normal cerebral perfusion.    2.  I discussed the negatives stroke findings with Dr. Perez at 7:05 PM 10/23/2022   CT Head Perfusion w Contrast    Narrative    EXAM: CT HEAD PERFUSION W CONTRAST, CTA HEAD NECK W CONTRAST, CT HEAD W/O CONTRAST  LOCATION: [facility]  DATE/TIME: 10/23/2022 6:56 PM    INDICATION: Code stroke  COMPARISON: MRI brain 06/06/2022  TECHNIQUE: Head and neck CT angiogram with IV contrast. Noncontrast head CT followed by axial helical CT images of the head and neck vessels obtained during the arterial phase of intravenous contrast administration. Axial 2D reconstructed images and   multiplanar 3D MIP reconstructed images of the head and neck vessels were performed by the technologist. Additional CT cerebral perfusion was performed utilizing a second contrast bolus. Perfusion data were post processed with generation of standard   perfusion maps and estimation of ischemic/infarcted volumes utilizing standard threshold values. Dose reduction techniques were used. All stenosis measurements made according to NASCET criteria unless otherwise specified.  CONTRAST: 120mL  Isovue 370 (accession [id number]), 50mL Isovue 370 (accession [id number]), 50mL Isovue 370 (accession [id number])  COMPARISON: MRI brain 06/06/2022    FINDINGS:   NONCONTRAST HEAD CT:   INTRACRANIAL CONTENTS: No intracranial hemorrhage, extraaxial collection, or mass effect.  No CT evidence of acute infarct. Mild presumed chronic small vessel ischemic changes. Moderate generalized volume loss. No hydrocephalus.     VISUALIZED ORBITS/SINUSES/MASTOIDS: Prior bilateral cataract surgery. Visualized portions of the orbits are otherwise unremarkable. No paranasal sinus mucosal disease. No middle ear or mastoid effusion.    BONES/SOFT TISSUES: No acute abnormality.    HEAD CTA:  ANTERIOR CIRCULATION: No stenosis/occlusion, aneurysm, or high flow vascular malformation. There is nonstenotic atherosclerotic calcification of the bilateral carotid siphons. Fetal origin of the left posterior cerebral artery from the anterior   circulation.    POSTERIOR CIRCULATION: No stenosis/occlusion, aneurysm, or high flow vascular malformation. Balanced vertebral arteries supply a normal basilar artery.     DURAL VENOUS SINUSES: Not well evaluated on a technical basis.    NECK CTA:  RIGHT CAROTID: No measurable stenosis or dissection.    LEFT CAROTID: No measurable stenosis or dissection.    VERTEBRAL ARTERIES: No focal high-grade stenosis or dissection. There are mild to moderate narrowings of the bilateral vertebral arteries at the C3-C4 level secondary to severe facet hypertrophy and uncovertebral hypertrophy at this levels. Balanced   vertebral arteries.    AORTIC ARCH: Classic aortic arch anatomy with no significant stenosis at the origin of the great vessels.    NONVASCULAR STRUCTURES: Unremarkable.    CT PERFUSION:  PERFUSION MAPS: Symmetrical cerebral perfusion. No focal deficits in cerebral blood flow or volume to suggest ischemia/oligemia.    RAPID ANALYSIS:  CBF<30%: 0  Tmax>6sec: 0  Mismatch volume: 0  Mismatch ratio: 0       Impression    IMPRESSION:   HEAD CT:  1.  No CT evidence for acute intracranial process.  2.  Brain atrophy and presumed chronic microvascular ischemic changes as above.  3.  Left frontal and ethmoid sinusitis.    HEAD CTA:   1.  Normal CTA [place].    NECK CTA:  1.  No high-grade carotid or vertebral artery stenosis, occlusion, or dissection.  2.  Mild to moderate stenoses of the bilateral vertebral arteries at the C3-C4 level secondary to severe facet and uncovertebral hypertrophy at this level.    CT PERFUSION:  1.  Normal cerebral perfusion.    2.  I discussed the negatives stroke findings with Dr. Perez at 7:05 PM 10/23/2022   XR Chest 2 Views    Narrative    EXAM: XR CHEST 2 VIEWS  LOCATION: [facility]  DATE/TIME: 10/23/2022 7:35 PM    INDICATION: Shortness of breath and hypoxia.  COMPARISON: None.      Impression    IMPRESSION: Normal heart size and pulmonary vascularity. No focal alveolar infiltrate, consolidation or significant pleural fluid. No pneumothorax. Tortuous and ectatic calcified thoracic aorta. Degenerative changes in the spine and shoulders.   Echo Limited   Result Value    LVEF  >70%    Narrative    [id number]  [id number]  [id number]  [id number]^JOSELYN^JOE^JULIA     [facility]  Echocardiography Laboratory  [address] [place] [address]     Name: KIM VU  MRN: [id number]  : 1940  Study Date: 10/24/2022 07:51 AM  Age: 82 yrs  Gender: Female  Patient Location: [facility]  Reason For Study: Syncope  Ordering Physician: JOE ALVES  Performed By: Miesha Vincent     BSA: 1.8 m2  Height: 63 in  Weight: 173 lb  HR: 67  ______________________________________________________________________________  Procedure  Limited Portable Echo Adult.  ______________________________________________________________________________  Interpretation Summary     Technically limited study due to patient body habitus.      Hyperdynamic left ventricular function. The visual ejection fraction is >70%.  Endocardial borders are not optimally visualized, however no clear wall motion  abnormalities are seen.  Right ventricle is not well visualized, however global systolic function is  probably normal.  Valves not well visualized, assessment limited. There is trace aortic  regurgitation.     There are no prior studies available for comparison.  ______________________________________________________________________________  Left Ventricle  The left ventricle is not well visualized. Hyperdynamic left ventricular  function. The visual ejection fraction is >70%. Regional wall motion  abnormalities cannot be excluded due to limited visualization. Endocardial  borders are not optimally visualized, however no clear wall motion  abnormalities are seen.     Right Ventricle  The right ventricle is not well visualized. Right ventricle is not well  visualized, however global systolic function is probably normal.     Mitral Valve  There is trace mitral regurgitation.     Tricuspid Valve  The tricuspid valve is not well visualized. The tricuspid valve is not well  visualized, but is grossly normal. Right ventricular systolic pressure could  not be approximated due to inadequate tricuspid regurgitation.     Aortic Valve  The aortic valve is not well visualized. There is trace aortic regurgitation.     Pulmonic Valve  The pulmonic valve is not well visualized.     Vessels  The aortic root is not well visualized. The inferior vena cava cannot be  assessed.     Pericardium  The pericardium is not well visualized.     ______________________________________________________________________________  Doppler Measurements & Calculations  MV E max maria d: 68.7 cm/sec  MV A max maria d: 123.7 cm/sec  MV E/A: 0.56  MV max P.9 mmHg  MV mean P.0 mmHg  MV V2 VTI: 30.9 cm     MV dec time: 0.29 sec  E/E' avg: 10.3  Lateral E/e': 9.7  Medial E/e': 10.9      ______________________________________________________________________________  Report approved by: Nataly Yun 10/24/2022 09:35 AM               Brennan Villanueva DO [address]  [place] [id number]  10/24/2022

## 2022-10-24 NOTE — PLAN OF CARE
End of shift summary ([id number])    Pt disoriented to time/place, forgetful. VSS on 3L O2 per NC except elevated BP. Denied pain. Tele SR w/ BBB, denied CP. PIV x 2 to right intact, SL. Up Ax1-2 GB W, unsteady gait (fall prevention measures taken including, but not limited to, non-skid socks, bed and chair alarms in use, GB and W, WAR). Started IV remdesivir this shift. Had ECHO and CT this shift, awaiting MRI readiness for pt. Maintained covid iso precautions. Will continue POC.     Goal Outcome Evaluation:      Plan of Care Reviewed With: patient    Overall Patient Progress: no changeOverall Patient Progress: no change

## 2022-10-24 NOTE — PLAN OF CARE
A/Ox3. Forgetful. Confusion has improved since being in ED. Ax1 w/ GB. Pt slept most of shift. 2 IV's have been loss, able to get another IV in R hand. 4 L NC. Tele: . Covid positive. 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 Trace edema in BLE. Continue POC.

## 2022-10-24 NOTE — ED NOTES
[facility]  ED Nurse Handoff Report    Keisha Kolb is a 82 year old female   ED Chief complaint: Fall and Altered Mental Status  . ED Diagnosis:   Final diagnoses:   Fall, initial encounter   Altered mental status, unspecified altered mental status type   Acute respiratory failure with hypoxia (H)   Acute cough     Allergies: No Known Allergies    Code Status: Full Code  Activity level - Baseline/Home:  Independent. Activity Level - Current:   Assist X 1. Lift room needed: No. Bariatric: No   Needed: No   Isolation: Yes. Infection: Not Applicable  COVID r/o and special precautions.     Vital Signs:   Vitals:    10/23/22 1732 10/23/22 2005   BP: (!) 140/75 134/74   Pulse: 83 75   Resp: 20 25   Temp: 100.2  F (37.9  C)    SpO2: 96%        Cardiac Rhythm:  ,      Pain level:    Patient confused: Yes. Patient Falls Risk: Yes.   Elimination Status: Has voided   Patient Report - Initial Complaint:  Pt more weak at home with a cough for the last couple days.  Fell at home today.  More confused today.  Covid positive.  Tests Performed: . Abnormal Results: [facility]  ED Nurse Handoff Report    Keisha Kolb is a 82 year old female   ED Chief complaint: Fall and Altered Mental Status  . ED Diagnosis:   Final diagnoses:   Fall, initial encounter   Altered mental status, unspecified altered mental status type   Acute respiratory failure with hypoxia (H)   Acute cough     Allergies: No Known Allergies    Code Status: Full Code  Activity level - Baseline/Home:  Independent. Activity Level - Current:   Assist X 1. Lift room needed: No. Bariatric: No   Needed: No   Isolation: Yes. Infection: Not Applicable  COVID r/o and special precautions.     Vital Signs:   Vitals:    10/23/22 1732 10/23/22 2005   BP: (!) 140/75 134/74   Pulse: 83 75   Resp: 20 25   Temp: 100.2  F (37.9  C)    SpO2: 96%        Cardiac Rhythm:  ,      Pain level:    Patient confused: Yes. Patient  Falls Risk: Yes.   Elimination Status: Has voided   Patient Report - Initial Complaint: Pt more weak at home the last couple days, cough started.  Fell at home today, confusion noted.  Covid positive.  Tests Performed:  Labs Ordered and Resulted from Time of ED Arrival to Time of ED Departure   BASIC METABOLIC PANEL - Abnormal       Result Value    Sodium 136      Potassium 4.0      Chloride 100      Carbon Dioxide (CO2) 26      Anion Gap 10      Urea Nitrogen 9.0      Creatinine 0.87      Calcium 8.9      Glucose 117 (*)     GFR Estimate 66     BLOOD GAS VENOUS - Abnormal    pH Venous 7.44 (*)     pCO2 Venous 42      pO2 Venous 44      Bicarbonate Venous 28      Base Excess/Deficit (+/-) 3.5 (*)     FIO2 4     ROUTINE UA WITH MICROSCOPIC REFLEX TO CULTURE - Abnormal    Color Urine Yellow      Appearance Urine Slightly Cloudy (*)     Glucose Urine Negative      Bilirubin Urine Negative      Ketones Urine Negative      Specific Gravity Urine 1.010      Blood Urine Trace (*)     pH Urine 7.5 (*)     Protein Albumin Urine Negative      Urobilinogen Urine Normal      Nitrite Urine Negative      Leukocyte Esterase Urine Moderate (*)     Bacteria Urine Few (*)     RBC Urine 2      WBC Urine 4      Squamous Epithelials Urine 2 (*)    INFLUENZA A/B & SARS-COV2 PCR MULTIPLEX - Abnormal    Influenza A PCR Negative      Influenza B PCR Negative      RSV PCR Negative      SARS CoV2 PCR Positive (*)    CBC WITH PLATELETS AND DIFFERENTIAL - Abnormal    WBC Count 8.3      RBC Count 4.37      Hemoglobin 13.1      Hematocrit 41.0      MCV 94      MCH 30.0      MCHC 32.0      RDW 13.3      Platelet Count 161      % Neutrophils 85      % Lymphocytes 7      % Monocytes 6      % Eosinophils 1      % Basophils 0      % Immature Granulocytes 1      NRBCs per 100 WBC 0      Absolute Neutrophils 7.2      Absolute Lymphocytes 0.6 (*)     Absolute Monocytes 0.5      Absolute Eosinophils 0.0      Absolute Basophils 0.0      Absolute Immature  Granulocytes 0.0      Absolute NRBCs 0.0     GLUCOSE BY METER - Abnormal    GLUCOSE BY METER POCT 110 (*)    TROPONIN T, HIGH SENSITIVITY - Normal    Troponin T, High Sensitivity 12     NT PROBNP INPATIENT - Normal    N terminal Pro BNP Inpatient 188     LACTIC ACID WHOLE BLOOD - Normal    Lactic Acid 0.8     CK TOTAL - Normal    CK 62     BLOOD CULTURE   BLOOD CULTURE   URINE CULTURE      XR Chest 2 Views   Final Result   IMPRESSION: Normal heart size and pulmonary vascularity. No focal alveolar infiltrate, consolidation or significant pleural fluid. No pneumothorax. Tortuous and ectatic calcified thoracic aorta. Degenerative changes in the spine and shoulders.      CT Head Perfusion w Contrast   Final Result   IMPRESSION:    HEAD CT:   1.  No CT evidence for acute intracranial process.   2.  Brain atrophy and presumed chronic microvascular ischemic changes as above.   3.  Left frontal and ethmoid sinusitis.      HEAD CTA:    1.  Normal CTA [place].      NECK CTA:   1.  No high-grade carotid or vertebral artery stenosis, occlusion, or dissection.   2.  Mild to moderate stenoses of the bilateral vertebral arteries at the C3-C4 level secondary to severe facet and uncovertebral hypertrophy at this level.      CT PERFUSION:   1.  Normal cerebral perfusion.      2.  I discussed the negatives stroke findings with Dr. Perez at 7:05 PM 10/23/2022      CTA Head Neck w Contrast   Final Result   IMPRESSION:    HEAD CT:   1.  No CT evidence for acute intracranial process.   2.  Brain atrophy and presumed chronic microvascular ischemic changes as above.   3.  Left frontal and ethmoid sinusitis.      HEAD CTA:    1.  Normal CTA [place].      NECK CTA:   1.  No high-grade carotid or vertebral artery stenosis, occlusion, or dissection.   2.  Mild to moderate stenoses of the bilateral vertebral arteries at the C3-C4 level secondary to severe facet and uncovertebral hypertrophy at this level.      CT PERFUSION:    1.  Normal cerebral perfusion.      2.  I discussed the negatives stroke findings with Dr. Perez at 7:05 PM 10/23/2022      CT Head w/o Contrast   Final Result   IMPRESSION:    HEAD CT:   1.  No CT evidence for acute intracranial process.   2.  Brain atrophy and presumed chronic microvascular ischemic changes as above.   3.  Left frontal and ethmoid sinusitis.      HEAD CTA:    1.  Normal CTA [place].      NECK CTA:   1.  No high-grade carotid or vertebral artery stenosis, occlusion, or dissection.   2.  Mild to moderate stenoses of the bilateral vertebral arteries at the C3-C4 level secondary to severe facet and uncovertebral hypertrophy at this level.      CT PERFUSION:   1.  Normal cerebral perfusion.      2.  I discussed the negatives stroke findings with Dr. Perez at 7:05 PM 10/23/2022       .   Treatments provided: decadyessi tylenol  Family Comments: lives with granddaughter who is full time caregiver.  OBS brochure/video discussed/provided to patient:  N/A  ED Medications:   Medications   acetaminophen (TYLENOL) Suppository 650 mg (650 mg Rectal Not Given 10/23/22 2013)   acetaminophen (TYLENOL) tablet 325 mg (325 mg Oral Not Given 10/23/22 2013)   iopamidol (ISOVUE-370) solution 500 mL (120 mLs Intravenous Given 10/23/22 1846)   Sodium Chloride for CT Scan Flush Use (95 mLs Intravenous Given 10/23/22 1846)   dexamethasone PF (DECADRON) injection 6 mg (6 mg Intravenous Given 10/23/22 2026)   acetaminophen (TYLENOL) tablet 650 mg (650 mg Oral Given 10/23/22 2025)     Drips infusing:  No  For the majority of the shift, the patient's behavior Green. Interventions performed were none.    Sepsis treatment initiated: No     Patient tested for COVID 19 prior to admission: YES    ED Nurse Name/Phone Number: Elizabeth Bonilla RN,   8:57 PM  ;   Treatments provided: tylenol, decadron  Family Comments: lives with granddaughter  OBS brochure/video discussed/provided to patient:  N/A  ED Medications:   Medications    acetaminophen (TYLENOL) Suppository 650 mg (650 mg Rectal Not Given 10/23/22 2013)   acetaminophen (TYLENOL) tablet 325 mg (325 mg Oral Not Given 10/23/22 2013)   iopamidol (ISOVUE-370) solution 500 mL (120 mLs Intravenous Given 10/23/22 1846)   Sodium Chloride for CT Scan Flush Use (95 mLs Intravenous Given 10/23/22 1846)   dexamethasone PF (DECADRON) injection 6 mg (6 mg Intravenous Given 10/23/22 2026)   acetaminophen (TYLENOL) tablet 650 mg (650 mg Oral Given 10/23/22 2025)     Drips infusing:  No  For the majority of the shift, the patient's behavior Green. Interventions performed were none.    Sepsis treatment initiated: No     Patient tested for COVID 19 prior to admission: YES    ED Nurse Name/Phone Number: Elizabeth Bonilla RN,   8:52 PM     RECEIVING UNIT ED HANDOFF REVIEW    Above ED Nurse Handoff Report was reviewed: Yes  Reviewed by: Haider Carias RN on October 23, 2022 at 9:17 PM

## 2022-10-25 ENCOUNTER — APPOINTMENT (OUTPATIENT)
Dept: PHYSICAL THERAPY | Facility: CLINIC | Age: 82
DRG: 177 | End: 2022-10-25
Payer: COMMERCIAL

## 2022-10-25 LAB
ANION GAP SERPL CALCULATED.3IONS-SCNC: 9 MMOL/L (ref 7–15)
BACTERIA UR CULT: NORMAL
BUN SERPL-MCNC: 19.9 MG/DL (ref 8–23)
CALCIUM SERPL-MCNC: 8.9 MG/DL (ref 8.8–10.2)
CHLORIDE SERPL-SCNC: 106 MMOL/L (ref 98–107)
CREAT SERPL-MCNC: 0.75 MG/DL (ref 0.51–0.95)
CRP SERPL-MCNC: 23.55 MG/L
D DIMER PPP FEU-MCNC: 0.81 UG/ML FEU (ref 0–0.5)
DEPRECATED HCO3 PLAS-SCNC: 25 MMOL/L (ref 22–29)
ERYTHROCYTE [DISTWIDTH] IN BLOOD BY AUTOMATED COUNT: 13.3 % (ref 10–15)
GFR SERPL CREATININE-BSD FRML MDRD: 79 ML/MIN/1.73M2
GLUCOSE BLDC GLUCOMTR-MCNC: 172 MG/DL (ref 70–99)
GLUCOSE SERPL-MCNC: 162 MG/DL (ref 70–99)
HCT VFR BLD AUTO: 39.4 % (ref 35–47)
HGB BLD-MCNC: 12.3 G/DL (ref 11.7–15.7)
MCH RBC QN AUTO: 29.6 PG (ref 26.5–33)
MCHC RBC AUTO-ENTMCNC: 31.2 G/DL (ref 31.5–36.5)
MCV RBC AUTO: 95 FL (ref 78–100)
PLATELET # BLD AUTO: 172 10E3/UL (ref 150–450)
POTASSIUM SERPL-SCNC: 4 MMOL/L (ref 3.4–5.3)
RBC # BLD AUTO: 4.15 10E6/UL (ref 3.8–5.2)
SODIUM SERPL-SCNC: 140 MMOL/L (ref 136–145)
WBC # BLD AUTO: 13 10E3/UL (ref 4–11)

## 2022-10-25 PROCEDURE — 250N000011 HC RX IP 250 OP 636: Performed by: HOSPITALIST

## 2022-10-25 PROCEDURE — 97116 GAIT TRAINING THERAPY: CPT | Mod: GP | Performed by: PHYSICAL THERAPIST

## 2022-10-25 PROCEDURE — 120N000001 HC R&B MED SURG/OB

## 2022-10-25 PROCEDURE — 250N000013 HC RX MED GY IP 250 OP 250 PS 637: Performed by: INTERNAL MEDICINE

## 2022-10-25 PROCEDURE — 86140 C-REACTIVE PROTEIN: CPT | Performed by: HOSPITALIST

## 2022-10-25 PROCEDURE — 258N000003 HC RX IP 258 OP 636: Performed by: HOSPITALIST

## 2022-10-25 PROCEDURE — 250N000011 HC RX IP 250 OP 636: Performed by: INTERNAL MEDICINE

## 2022-10-25 PROCEDURE — 99232 SBSQ HOSP IP/OBS MODERATE 35: CPT | Performed by: HOSPITALIST

## 2022-10-25 PROCEDURE — 85379 FIBRIN DEGRADATION QUANT: CPT | Performed by: HOSPITALIST

## 2022-10-25 PROCEDURE — 97530 THERAPEUTIC ACTIVITIES: CPT | Mod: GP | Performed by: PHYSICAL THERAPIST

## 2022-10-25 PROCEDURE — 80048 BASIC METABOLIC PNL TOTAL CA: CPT | Performed by: HOSPITALIST

## 2022-10-25 PROCEDURE — 85027 COMPLETE CBC AUTOMATED: CPT | Performed by: HOSPITALIST

## 2022-10-25 PROCEDURE — 36415 COLL VENOUS BLD VENIPUNCTURE: CPT | Performed by: HOSPITALIST

## 2022-10-25 PROCEDURE — 250N000013 HC RX MED GY IP 250 OP 250 PS 637: Performed by: HOSPITALIST

## 2022-10-25 RX ORDER — LORAZEPAM 0.5 MG/1
0.5 TABLET ORAL EVERY 6 HOURS PRN
Status: DISCONTINUED | OUTPATIENT
Start: 2022-10-25 | End: 2022-10-26 | Stop reason: HOSPADM

## 2022-10-25 RX ORDER — LORAZEPAM 0.5 MG/1
0.5 TABLET ORAL
Status: COMPLETED | OUTPATIENT
Start: 2022-10-25 | End: 2022-10-25

## 2022-10-25 RX ADMIN — SODIUM CHLORIDE 50 ML: 9 INJECTION, SOLUTION INTRAVENOUS at 17:32

## 2022-10-25 RX ADMIN — ENOXAPARIN SODIUM 40 MG: 40 INJECTION SUBCUTANEOUS at 22:58

## 2022-10-25 RX ADMIN — REMDESIVIR 100 MG: 100 INJECTION, POWDER, LYOPHILIZED, FOR SOLUTION INTRAVENOUS at 17:24

## 2022-10-25 RX ADMIN — LORAZEPAM 0.5 MG: 0.5 TABLET ORAL at 12:03

## 2022-10-25 RX ADMIN — GUAIFENESIN 10 ML: 200 SOLUTION ORAL at 09:52

## 2022-10-25 RX ADMIN — CETIRIZINE HYDROCHLORIDE 10 MG: 10 TABLET, FILM COATED ORAL at 09:38

## 2022-10-25 RX ADMIN — CITALOPRAM HYDROBROMIDE 40 MG: 20 TABLET ORAL at 21:31

## 2022-10-25 RX ADMIN — DEXAMETHASONE 6 MG: 2 TABLET ORAL at 13:29

## 2022-10-25 RX ADMIN — SIMVASTATIN 20 MG: 20 TABLET, FILM COATED ORAL at 21:31

## 2022-10-25 RX ADMIN — CETIRIZINE HYDROCHLORIDE 10 MG: 10 TABLET, FILM COATED ORAL at 21:31

## 2022-10-25 RX ADMIN — LORAZEPAM 0.5 MG: 0.5 TABLET ORAL at 09:52

## 2022-10-25 ASSESSMENT — ACTIVITIES OF DAILY LIVING (ADL)
ADLS_ACUITY_SCORE: 39
ADLS_ACUITY_SCORE: 35
ADLS_ACUITY_SCORE: 36
ADLS_ACUITY_SCORE: 40
ADLS_ACUITY_SCORE: 35
ADLS_ACUITY_SCORE: 35
ADLS_ACUITY_SCORE: 36
ADLS_ACUITY_SCORE: 35
ADLS_ACUITY_SCORE: 35
ADLS_ACUITY_SCORE: 39
ADLS_ACUITY_SCORE: 39
ADLS_ACUITY_SCORE: 40

## 2022-10-25 NOTE — PROGRESS NOTES
[facility]    Hospitalist Progress Note  Name: Keisha Kolb    MRN: [id number]  Provider:  Brennan Villanueva DO MPH  Date of Service: 10/25/2022    Summary of Stay: Keisha Kolb is a 82 year old female with PMH including anxiety who presents with a fall episode and altered mental status. she was normal around 2 PM though has had a cough that has been present for couple of days.  However, she was later found down on the ground at around 4:45 PM and she doesn't recall how she got there.  She has had subjective fevers and some nausea.  Her granddaughter noticed that she was confused and had some slurred speech so called EMS.  She was ultimately able to walk down the stairs to EMS.  On arrival her oxygen saturation was 88% on room air.  She was brought into the emergency room.     Here she continued to require supplemental oxygen.  She had a temp of 100.2.  Lab work-up including CBC, BMP and urine analysis were relatively unrevealing but she did test positive for COVID-19.  Stroke neurology was consulted as she was noted to have a positive Romberg sign and she had CT head and CTA head and neck which were negative.  Nonemergent MRI was recommended for completion of her stroke evaluation.  She was started on Decadron and admitted.     Assessment and Plan:   Covid-19 with acute hypoxemic respiratory failure: Continues to require about 3L O2 by NC.  Feeling slightly better this morning.  She is fully vaccinated and boosted against COVID infection.  Her granddaughter who she lives with is also ill with COVID.  CXR shows no infiltrate.  --Continue Decadron 6 mg for total of 10 days.  --Started remdesivir given stable creatinine and LFTs, she is agreeable.  --Lovenox for DVT prophylaxis.  --Checked d-dimer as requiring oxygen without infiltrates seen on CXR, CT negative for PE.  No obvious RV abnormalities on TTE.     Generalized weakness, falls, possible syncope: Suspect secondary to #1.  CT head  and CTA head and neck as part of code stroke negative.  We will check an MRI for completeness given that she has some instability on exam.  Otherwise work-up has been unrevealing of acute pathology.  UA negative for infection, chest x-ray negative for focal infiltrates and no other major metabolic issue identified.  Can't fully rule out seizure or other cause of syncope.  --PT and social work consults, has 24/7 supervision at home.  --MRI brain for completeness was negative.  --Monitor on tele as remdesivir can cause bradycardia.  --TTE grossly normal.     Toxic metabolic encephalopathy: Most likely related to COVID-19 and hypoxemic respiratory failure.  Checking MRI brain.  Mental status baseline today.     History of anxiety:   --Resumed her home citalopram.    --She apparently takes 1 mg of Ativan daily.  Will reduce this dose to 0.5 mg and use as needed for now.  She is reporting some increased anxiety.     History of hives: We will resume her home antihistamines.    DVT Prophylaxis: Enoxaparin (Lovenox) SQ  Code Status: No CPR- Pre-arrest intubation OK  Diet: Combination Diet Regular Diet Adult    Izaguirre Catheter: Not present  Disposition: Expected discharge in 1 days to home. Goals prior to discharge include manage respiratory failure and anxiety.   Incidental Findings: As above.  Family updated today: Yes son by phone.     Interval History   The patient reports doing well. No confusion, reports anxiety. No chest pain or shortness of breath. No nausea, vomiting, diarrhea, constipation. No fevers. No other specific complaints identified.     -Data reviewed today: I personally reviewed all new labs and imaging results over the last 24 hours.     Physical Exam   Temp: 97.7  F (36.5  C) Temp src: Oral BP: 134/64 Pulse: 69   Resp: 18 SpO2: 94 % O2 Device: None (Room air) Oxygen Delivery: 3 LPM  Vitals:    10/23/22 2210   Weight: 78.6 kg (173 lb 4.8 oz)     Vital Signs with Ranges  Temp:  [96.9  F (36.1  C)-97.7  F  (36.5  C)] 97.7  F (36.5  C)  Pulse:  [66-87] 69  Resp:  [16-20] 18  BP: (128-171)/() 134/64  SpO2:  [93 %-98 %] 94 %  I/O last 3 completed shifts:  In: 120 [P.O.:120]  Out: -     GENERAL: No apparent distress. Awake, alert, and fully oriented.  HEENT: Normocephalic, atraumatic. Extraocular movements intact.  CARDIOVASCULAR: Regular rate and rhythm without murmurs or rubs. No S3.  PULMONARY: Clear bilaterally.  GASTROINTESTINAL: Soft, non-tender, non-distended. Bowel sounds normoactive.   EXTREMITIES: No cyanosis or clubbing. No edema.  NEUROLOGICAL: CN 2-12 grossly intact, no focal neurological deficits.  DERMATOLOGICAL: No rash, ulcer, bruising, nor jaundice.     Medications       remdesivir  100 mg Intravenous Q24H    And     sodium chloride 0.9%  50 mL Intravenous Q24H     cetirizine  10 mg Oral BID     citalopram  40 mg Oral At Bedtime     dexamethasone  6 mg Oral Daily     enoxaparin ANTICOAGULANT  40 mg Subcutaneous Q24H     simvastatin  20 mg Oral At Bedtime     sodium chloride (PF)  3 mL Intracatheter Q8H     Data     Laboratory:  Recent Labs   Lab 10/25/22  0706 10/23/22  1750   WBC 13.0* 8.3   HGB 12.3 13.1   HCT 39.4 41.0   MCV 95 94    161     Recent Labs   Lab 10/25/22  1135 10/25/22  0706 10/23/22  2222 10/23/22  1831 10/23/22  1750   NA  --  140  --   --  136   POTASSIUM  --  4.0  --   --  4.0   CHLORIDE  --  106  --   --  100   CO2  --  25  --   --  26   ANIONGAP  --  9  --   --  10   * 162*  --  110* 117*   BUN  --  19.9  --   --  9.0   CR  --  0.75 0.87  --  0.87   GFRESTIMATED  --  79 66  --  66   KRISTEL  --  8.9  --   --  8.9     No results for input(s): CULT in the last 168 hours.    Imaging:  Recent Results (from the past 24 hour(s))   CT Chest Pulmonary Embolism w Contrast    Narrative    CT CHEST PULMONARY EMBOLISM WITH CONTRAST  10/24/2022 2:29 PM    HISTORY: Covid, hypoxia, rule out pulmonary embolism      TECHNIQUE: Scans obtained from the apices through the diaphragm  with  IV contrast. 61mL Isovue-370 IV injected. Radiation dose for this scan  was reduced using automated exposure control, adjustment of the mA  and/or kV according to patient size, or iterative reconstruction  technique.    COMPARISON:  Chest x-ray on 10/23/2022    FINDINGS:  Chest/mediastinum: No evidence of pulmonary embolism. Cardiomegaly. No  significant pericardial effusion. No significant atherosclerotic  vascular calcification of the coronary arteries. Multiple prominent  mediastinal and hilar lymph nodes, likely reactive.    Lungs and pleura: No pleural effusion or pneumothorax. Basilar  pulmonary opacities, likely atelectasis.    Upper abdomen: Limited evaluation of upper abdomen due to lack of  coverage and timing of contrast.    Bones and soft tissue: Multilevel degenerative changes of the spine.  No suspicious osseous lesion.      Impression    IMPRESSION:   1. No evidence of pulmonary embolism.  2. Cardiomegaly.  3. Mild bibasilar pulmonary opacities, likely atelectasis.    LORRIE MODI MD         SYSTEM ID:  [id number]   MR Brain w/o Contrast    Narrative    EXAM: MR BRAIN W/O CONTRAST  LOCATION: [facility]  DATE/TIME: 10/24/2022 5:49 PM    INDICATION: falls, confusion, positive romberg.  eval for CVA  COMPARISON: CT head 10/23/2022  TECHNIQUE: Routine multiplanar multisequence head MRI without intravenous contrast.    FINDINGS:  INTRACRANIAL CONTENTS: No acute or subacute infarct. No mass, acute hemorrhage, or extra-axial fluid collections. Scattered nonspecific T2/FLAIR hyperintensities within the cerebral white matter most consistent with mild chronic microvascular ischemic   change. Moderate generalized cerebral atrophy. No hydrocephalus. Normal position of the cerebellar tonsils.     SELLA: No abnormality accounting for technique.    OSSEOUS STRUCTURES/SOFT TISSUES: Normal marrow signal. The major intracranial vascular flow voids are maintained.     ORBITS: No  abnormality accounting for technique.     SINUSES/MASTOIDS: Mild mucosal thickening scattered about the paranasal sinuses. Mild opacification right mastoid air cells.       Impression    IMPRESSION:  1.  No acute infarct, mass, mass effect, or hemorrhage.  2.  Mild to moderate chronic small vessel ischemia.  3.  Moderate atrophy.         Brennan Villanueva DO MPH  [facility] Hospitalist  [address] [place] [address]  10/25/2022

## 2022-10-25 NOTE — PLAN OF CARE
Goal Outcome Evaluation:       Admitting Diagnosis: AMS/ ARF   Pertinent History: Anxiety,  a fall episode.  For vitals and assessment please see flow sheet.   Living Situation: Home with family.   Pain plan:  denies pain.  Mobility:  assistance, 1 person/gb/w  Baseline activity:with assistive equipment.   Alarms/Safety:BA.   LDA's:  2 PIV.   Pertinent test results:  K+ 4.0, CR: 0.87.   Consults: PT following.   Abnormals/Pending: None  Other Cares/Comments: Alert to self/ forgetful, VSS except BP/ see flow sheet. Tele SR, LS clear/diminished. O2 93% 3L NC.  MRI/CT today.   Discharge Disposition:  Possible 1-2 days.   Discharge Time:  TBD.

## 2022-10-25 NOTE — PLAN OF CARE
"End of shift summary ([id number])    Pt disoriented to time, forgetful. VSS on RA except elevated BP. Denied pain. PRN PO ativan given per pt request for reported anxiety. Pt verbalized anxiety r/t discharge plans and that she would like to discuss options w/ PT and SW regarding home w/ HC vs discharging to a facility w/ assistance (PT informed via [employer] message). Tele SR w/ BBB, denied CP but did report feeling a \"racing heart\" w/ associated anxiety which resolved w/ PRN ativan, conversation and sleep; MD aware. PIV x 2 to right intact, SL. Purewick placed per pt request for reports of \"just not feeling like getting up, I am dizzy.\" Poor PO intake. Awaiting PT eval this afternoon. Discharge plan TBD. Maintained covid iso precautions. Will continue POC.     Goal Outcome Evaluation:      Plan of Care Reviewed With: patient    Overall Patient Progress: improvingOverall Patient Progress: improving           "

## 2022-10-25 NOTE — PLAN OF CARE
Goal Outcome Evaluation:       VS stable. 98% on 3L 02. Diminished LS with nonproductive cough. Alert x 2. Forgetful. Tele is sinus rhythm. No complaints of pain. Slept well throughout the night.

## 2022-10-26 ENCOUNTER — APPOINTMENT (OUTPATIENT)
Dept: PHYSICAL THERAPY | Facility: CLINIC | Age: 82
DRG: 177 | End: 2022-10-26
Payer: COMMERCIAL

## 2022-10-26 VITALS
TEMPERATURE: 97.4 F | SYSTOLIC BLOOD PRESSURE: 176 MMHG | BODY MASS INDEX: 30.71 KG/M2 | DIASTOLIC BLOOD PRESSURE: 76 MMHG | HEART RATE: 64 BPM | HEIGHT: 63 IN | OXYGEN SATURATION: 94 % | RESPIRATION RATE: 15 BRPM | WEIGHT: 173.3 LBS

## 2022-10-26 LAB
CREAT SERPL-MCNC: 0.78 MG/DL (ref 0.51–0.95)
GFR SERPL CREATININE-BSD FRML MDRD: 75 ML/MIN/1.73M2
PLATELET # BLD AUTO: 202 10E3/UL (ref 150–450)

## 2022-10-26 PROCEDURE — 85049 AUTOMATED PLATELET COUNT: CPT | Performed by: INTERNAL MEDICINE

## 2022-10-26 PROCEDURE — 250N000013 HC RX MED GY IP 250 OP 250 PS 637: Performed by: HOSPITALIST

## 2022-10-26 PROCEDURE — 99239 HOSP IP/OBS DSCHRG MGMT >30: CPT | Performed by: HOSPITALIST

## 2022-10-26 PROCEDURE — 250N000013 HC RX MED GY IP 250 OP 250 PS 637: Performed by: INTERNAL MEDICINE

## 2022-10-26 PROCEDURE — 97530 THERAPEUTIC ACTIVITIES: CPT | Mod: GP | Performed by: PHYSICAL THERAPIST

## 2022-10-26 PROCEDURE — 82565 ASSAY OF CREATININE: CPT | Performed by: INTERNAL MEDICINE

## 2022-10-26 PROCEDURE — 97116 GAIT TRAINING THERAPY: CPT | Mod: GP | Performed by: PHYSICAL THERAPIST

## 2022-10-26 PROCEDURE — 250N000011 HC RX IP 250 OP 636: Performed by: INTERNAL MEDICINE

## 2022-10-26 PROCEDURE — 36415 COLL VENOUS BLD VENIPUNCTURE: CPT | Performed by: INTERNAL MEDICINE

## 2022-10-26 RX ADMIN — LORAZEPAM 0.5 MG: 0.5 TABLET ORAL at 09:33

## 2022-10-26 RX ADMIN — CETIRIZINE HYDROCHLORIDE 10 MG: 10 TABLET, FILM COATED ORAL at 09:33

## 2022-10-26 RX ADMIN — DEXAMETHASONE 6 MG: 2 TABLET ORAL at 13:47

## 2022-10-26 RX ADMIN — LORAZEPAM 0.5 MG: 0.5 TABLET ORAL at 00:55

## 2022-10-26 ASSESSMENT — ACTIVITIES OF DAILY LIVING (ADL)
ADLS_ACUITY_SCORE: 41
ADLS_ACUITY_SCORE: 41
ADLS_ACUITY_SCORE: 39
ADLS_ACUITY_SCORE: 39
ADLS_ACUITY_SCORE: 44
ADLS_ACUITY_SCORE: 39
ADLS_ACUITY_SCORE: 41
ADLS_ACUITY_SCORE: 39
ADLS_ACUITY_SCORE: 39

## 2022-10-26 NOTE — PLAN OF CARE
Goal Outcome Evaluation:       Pt A&Ox4, forgetful. Denies pain, SOB, dizziness. SBA w/ RW & GB. Per tele tech NSR. Discharge information reviewed with patient, questions answered. Pt discharged home with granddaughter. All belongings sent home with patient.

## 2022-10-26 NOTE — PLAN OF CARE
End of shift summary ([id number])    Pt disoriented to time/situation, forgetful. VSS on RA except elevated BP. Denied pain. PRN PO ativan given per pt request for reported anxiety. Tele SR w/ BBB, denied CP. Denied SOB. PIV x 2 to right intact, SL. Up Ax1 GB W w/ unsteady gait, incontinent at times. Discharge plan pending; pt expressing great concern about discharging home. PT evaluated pt and has been in contact w/ SW, awaiting discharge plan. Maintained covid iso precautions. Will continue POC.     Goal Outcome Evaluation:      Plan of Care Reviewed With: patient    Overall Patient Progress: no changeOverall Patient Progress: no change

## 2022-10-26 NOTE — PLAN OF CARE
Goal Outcome Evaluation:       VS stable. Diminished LS. BAZAN. Forgetful. No complaints of pain. Ativan given for restlessness/ anxiety. Tele is sinus rhythm. Slept on and off throughout the night.

## 2022-10-26 NOTE — PROGRESS NOTES
Care Management Discharge Note    Discharge Date: 10/27/2022       Discharge Disposition: Home with home care for PT and OT    Discharge Services:  Home care for PT and OT    Discharge DME: none     Discharge Transportation:  Annika Alfaro to transport    Private pay costs discussed: Not applicable    PAS Confirmation Code:  Not applicable  Patient/family educated on Medicare website which has current facility and service quality ratings: Not applicable     Education Provided on the Discharge Plan:  yes  Persons Notified of Discharge Plans: patient, granddaughter, Annika, floor RN, charge and HUC.  Patient/Family in Agreement with the Plan:  yes    Handoff Referral Completed: No    Additional Information:  Spoke with patient via phone as SW can not go into patient room due to patient's covid. We discussed her discharge planning. She has been talking with her granddaughter Annika who lives in patient's home. Patient reports that her granddaughter and her family live with patient. Patient reports that the granddaughter is ready for patient at home. Patient would like home PT and OT and referral has been made.    Granddaughter will transport between 6-630.    Updated floor RN, Charge and HUC.    SAVITA Alarcon   Inpatient Care Coordination   Supervisor  [facility]  [phone]        SAVITA Lee

## 2022-10-26 NOTE — DISCHARGE SUMMARY
Hospitalist Discharge Summary  [facility]    Keisha Kolb MRN# [id number]   YOB: 1940 Age: 82 year old     Date of Admission:  10/23/2022  Date of Discharge:  10/26/2022  Admitting Physician:  Kalpesh Latham MD  Discharge Physician:  Brennan Villanueva DO  Discharging Service:  Hospitalist     Primary Provider: [facility] [place]          Discharge Diagnosis:     1.  Acute hypoxic respiratory failure.  2.  COVID-19 pneumonia.  3.  Generalized weakness and deconditioning.  4.  Mechanical fall versus possible syncope.  5.  Infectious encephalopathy.  6.  Anxiety.             Discharge Disposition:     Discharged to home           Allergies:     No Known Allergies           Discharge Medications:     Current Discharge Medication List      CONTINUE these medications which have NOT CHANGED    Details   cetirizine (ZYRTEC) 10 MG tablet Take 10 mg by mouth 2 times daily      CITALOPRAM HYDROBROMIDE PO Take 40 mg by mouth At Bedtime      famotidine (PEPCID) 20 MG tablet Take 20 mg by mouth as needed 1 tablet daily prn with zyrtec      LORAZEPAM PO Take 1 mg by mouth every morning      SIMVASTATIN PO Take 20 mg by mouth At Bedtime      vitamin D3 (CHOLECALCIFEROL) 50 mcg (2000 units) tablet Take 1 tablet by mouth daily      acetaminophen (TYLENOL) 325 MG tablet Take 2 tablets (650 mg) by mouth every 6 hours as needed for mild pain or other (and adjunct with moderate or severe pain or per patient request)    Associated Diagnoses: Hives      EPINEPHrine (ANY BX GENERIC EQUIV) 0.3 MG/0.3ML injection 2-pack Inject 0.3 mLs (0.3 mg) into the muscle once as needed for anaphylaxis May repeat one time in 5-15 minutes if response to initial dose is inadequate.  Qty: 2 each, Refills: 0      triamcinolone (KENALOG) 0.1 % external cream Apply topically 2 times daily as needed for irritation (itchiness - don't place over open wounds)  Qty: 28.4 g, Refills: 0    Associated  "Diagnoses: Hives         STOP taking these medications       hydrOXYzine (ATARAX) 25 MG tablet Comments:   Reason for Stopping:                      Condition on Discharge:     Discharge condition: Stable   Discharge vitals: Blood pressure (!) 160/68, pulse 54, temperature 97.5  F (36.4  C), temperature source Oral, resp. rate 18, height 1.6 m (5' 3\"), weight 78.6 kg (173 lb 4.8 oz), SpO2 98 %.   Code status on discharge: DNR / DNI      BASIC PHYSICAL EXAMINATION:  GENERAL: No apparent distress.  CARDIOVASCULAR: Regular rate and rhythm without murmurs.  PULMONARY: Clear to auscultation bilaterally.   GASTROINTESTINAL: Abdomen soft, non-tender.  EXTREMITIES: No edema, pulses intact.  NEUROLOGIC: No focal deficits.            History of Illness:   See detailed admission note for full details.               Procedures excluding imaging which is summarized below:     Please see details in the electronic medical record.           Consultations:     PHYSICAL THERAPY ADULT IP CONSULT  SOCIAL WORK IP CONSULT  CARE MANAGEMENT / SOCIAL WORK IP CONSULT          Significant Results:     Results for orders placed or performed during the hospital encounter of 10/23/22   XR Chest 2 Views    Narrative    EXAM: XR CHEST 2 VIEWS  LOCATION: [facility]  DATE/TIME: 10/23/2022 7:35 PM    INDICATION: Shortness of breath and hypoxia.  COMPARISON: None.      Impression    IMPRESSION: Normal heart size and pulmonary vascularity. No focal alveolar infiltrate, consolidation or significant pleural fluid. No pneumothorax. Tortuous and ectatic calcified thoracic aorta. Degenerative changes in the spine and shoulders.   CT Head w/o Contrast    Narrative    EXAM: CT HEAD PERFUSION W CONTRAST, CTA HEAD NECK W CONTRAST, CT HEAD W/O CONTRAST  LOCATION: [facility]  DATE/TIME: 10/23/2022 6:56 PM    INDICATION: Code stroke  COMPARISON: MRI brain 06/06/2022  TECHNIQUE: Head and neck CT angiogram with IV " contrast. Noncontrast head CT followed by axial helical CT images of the head and neck vessels obtained during the arterial phase of intravenous contrast administration. Axial 2D reconstructed images and   multiplanar 3D MIP reconstructed images of the head and neck vessels were performed by the technologist. Additional CT cerebral perfusion was performed utilizing a second contrast bolus. Perfusion data were post processed with generation of standard   perfusion maps and estimation of ischemic/infarcted volumes utilizing standard threshold values. Dose reduction techniques were used. All stenosis measurements made according to NASCET criteria unless otherwise specified.  CONTRAST: 120mL Isovue 370 (accession [id number]), 50mL Isovue 370 (accession [id number]), 50mL Isovue 370 (accession [id number])  COMPARISON: MRI brain 06/06/2022    FINDINGS:   NONCONTRAST HEAD CT:   INTRACRANIAL CONTENTS: No intracranial hemorrhage, extraaxial collection, or mass effect.  No CT evidence of acute infarct. Mild presumed chronic small vessel ischemic changes. Moderate generalized volume loss. No hydrocephalus.     VISUALIZED ORBITS/SINUSES/MASTOIDS: Prior bilateral cataract surgery. Visualized portions of the orbits are otherwise unremarkable. No paranasal sinus mucosal disease. No middle ear or mastoid effusion.    BONES/SOFT TISSUES: No acute abnormality.    HEAD CTA:  ANTERIOR CIRCULATION: No stenosis/occlusion, aneurysm, or high flow vascular malformation. There is nonstenotic atherosclerotic calcification of the bilateral carotid siphons. Fetal origin of the left posterior cerebral artery from the anterior   circulation.    POSTERIOR CIRCULATION: No stenosis/occlusion, aneurysm, or high flow vascular malformation. Balanced vertebral arteries supply a normal basilar artery.     DURAL VENOUS SINUSES: Not well evaluated on a technical basis.    NECK CTA:  RIGHT CAROTID: No measurable stenosis or dissection.    LEFT CAROTID: No  measurable stenosis or dissection.    VERTEBRAL ARTERIES: No focal high-grade stenosis or dissection. There are mild to moderate narrowings of the bilateral vertebral arteries at the C3-C4 level secondary to severe facet hypertrophy and uncovertebral hypertrophy at this levels. Balanced   vertebral arteries.    AORTIC ARCH: Classic aortic arch anatomy with no significant stenosis at the origin of the great vessels.    NONVASCULAR STRUCTURES: Unremarkable.    CT PERFUSION:  PERFUSION MAPS: Symmetrical cerebral perfusion. No focal deficits in cerebral blood flow or volume to suggest ischemia/oligemia.    RAPID ANALYSIS:  CBF<30%: 0  Tmax>6sec: 0  Mismatch volume: 0  Mismatch ratio: 0      Impression    IMPRESSION:   HEAD CT:  1.  No CT evidence for acute intracranial process.  2.  Brain atrophy and presumed chronic microvascular ischemic changes as above.  3.  Left frontal and ethmoid sinusitis.    HEAD CTA:   1.  Normal CTA [place].    NECK CTA:  1.  No high-grade carotid or vertebral artery stenosis, occlusion, or dissection.  2.  Mild to moderate stenoses of the bilateral vertebral arteries at the C3-C4 level secondary to severe facet and uncovertebral hypertrophy at this level.    CT PERFUSION:  1.  Normal cerebral perfusion.    2.  I discussed the negatives stroke findings with Dr. Perez at 7:05 PM 10/23/2022   CT Head Perfusion w Contrast    Narrative    EXAM: CT HEAD PERFUSION W CONTRAST, CTA HEAD NECK W CONTRAST, CT HEAD W/O CONTRAST  LOCATION: [facility]  DATE/TIME: 10/23/2022 6:56 PM    INDICATION: Code stroke  COMPARISON: MRI brain 06/06/2022  TECHNIQUE: Head and neck CT angiogram with IV contrast. Noncontrast head CT followed by axial helical CT images of the head and neck vessels obtained during the arterial phase of intravenous contrast administration. Axial 2D reconstructed images and   multiplanar 3D MIP reconstructed images of the head and neck vessels were performed by  the technologist. Additional CT cerebral perfusion was performed utilizing a second contrast bolus. Perfusion data were post processed with generation of standard   perfusion maps and estimation of ischemic/infarcted volumes utilizing standard threshold values. Dose reduction techniques were used. All stenosis measurements made according to NASCET criteria unless otherwise specified.  CONTRAST: 120mL Isovue 370 (accession [id number]), 50mL Isovue 370 (accession [id number]), 50mL Isovue 370 (accession [id number])  COMPARISON: MRI brain 06/06/2022    FINDINGS:   NONCONTRAST HEAD CT:   INTRACRANIAL CONTENTS: No intracranial hemorrhage, extraaxial collection, or mass effect.  No CT evidence of acute infarct. Mild presumed chronic small vessel ischemic changes. Moderate generalized volume loss. No hydrocephalus.     VISUALIZED ORBITS/SINUSES/MASTOIDS: Prior bilateral cataract surgery. Visualized portions of the orbits are otherwise unremarkable. No paranasal sinus mucosal disease. No middle ear or mastoid effusion.    BONES/SOFT TISSUES: No acute abnormality.    HEAD CTA:  ANTERIOR CIRCULATION: No stenosis/occlusion, aneurysm, or high flow vascular malformation. There is nonstenotic atherosclerotic calcification of the bilateral carotid siphons. Fetal origin of the left posterior cerebral artery from the anterior   circulation.    POSTERIOR CIRCULATION: No stenosis/occlusion, aneurysm, or high flow vascular malformation. Balanced vertebral arteries supply a normal basilar artery.     DURAL VENOUS SINUSES: Not well evaluated on a technical basis.    NECK CTA:  RIGHT CAROTID: No measurable stenosis or dissection.    LEFT CAROTID: No measurable stenosis or dissection.    VERTEBRAL ARTERIES: No focal high-grade stenosis or dissection. There are mild to moderate narrowings of the bilateral vertebral arteries at the C3-C4 level secondary to severe facet hypertrophy and uncovertebral hypertrophy at this levels. Balanced    vertebral arteries.    AORTIC ARCH: Classic aortic arch anatomy with no significant stenosis at the origin of the great vessels.    NONVASCULAR STRUCTURES: Unremarkable.    CT PERFUSION:  PERFUSION MAPS: Symmetrical cerebral perfusion. No focal deficits in cerebral blood flow or volume to suggest ischemia/oligemia.    RAPID ANALYSIS:  CBF<30%: 0  Tmax>6sec: 0  Mismatch volume: 0  Mismatch ratio: 0      Impression    IMPRESSION:   HEAD CT:  1.  No CT evidence for acute intracranial process.  2.  Brain atrophy and presumed chronic microvascular ischemic changes as above.  3.  Left frontal and ethmoid sinusitis.    HEAD CTA:   1.  Normal CTA [place].    NECK CTA:  1.  No high-grade carotid or vertebral artery stenosis, occlusion, or dissection.  2.  Mild to moderate stenoses of the bilateral vertebral arteries at the C3-C4 level secondary to severe facet and uncovertebral hypertrophy at this level.    CT PERFUSION:  1.  Normal cerebral perfusion.    2.  I discussed the negatives stroke findings with Dr. Perez at 7:05 PM 10/23/2022   CTA Head Neck w Contrast    Narrative    EXAM: CT HEAD PERFUSION W CONTRAST, CTA HEAD NECK W CONTRAST, CT HEAD W/O CONTRAST  LOCATION: [facility]  DATE/TIME: 10/23/2022 6:56 PM    INDICATION: Code stroke  COMPARISON: MRI brain 06/06/2022  TECHNIQUE: Head and neck CT angiogram with IV contrast. Noncontrast head CT followed by axial helical CT images of the head and neck vessels obtained during the arterial phase of intravenous contrast administration. Axial 2D reconstructed images and   multiplanar 3D MIP reconstructed images of the head and neck vessels were performed by the technologist. Additional CT cerebral perfusion was performed utilizing a second contrast bolus. Perfusion data were post processed with generation of standard   perfusion maps and estimation of ischemic/infarcted volumes utilizing standard threshold values. Dose reduction techniques  were used. All stenosis measurements made according to NASCET criteria unless otherwise specified.  CONTRAST: 120mL Isovue 370 (accession [id number]), 50mL Isovue 370 (accession [id number]), 50mL Isovue 370 (accession [id number])  COMPARISON: MRI brain 06/06/2022    FINDINGS:   NONCONTRAST HEAD CT:   INTRACRANIAL CONTENTS: No intracranial hemorrhage, extraaxial collection, or mass effect.  No CT evidence of acute infarct. Mild presumed chronic small vessel ischemic changes. Moderate generalized volume loss. No hydrocephalus.     VISUALIZED ORBITS/SINUSES/MASTOIDS: Prior bilateral cataract surgery. Visualized portions of the orbits are otherwise unremarkable. No paranasal sinus mucosal disease. No middle ear or mastoid effusion.    BONES/SOFT TISSUES: No acute abnormality.    HEAD CTA:  ANTERIOR CIRCULATION: No stenosis/occlusion, aneurysm, or high flow vascular malformation. There is nonstenotic atherosclerotic calcification of the bilateral carotid siphons. Fetal origin of the left posterior cerebral artery from the anterior   circulation.    POSTERIOR CIRCULATION: No stenosis/occlusion, aneurysm, or high flow vascular malformation. Balanced vertebral arteries supply a normal basilar artery.     DURAL VENOUS SINUSES: Not well evaluated on a technical basis.    NECK CTA:  RIGHT CAROTID: No measurable stenosis or dissection.    LEFT CAROTID: No measurable stenosis or dissection.    VERTEBRAL ARTERIES: No focal high-grade stenosis or dissection. There are mild to moderate narrowings of the bilateral vertebral arteries at the C3-C4 level secondary to severe facet hypertrophy and uncovertebral hypertrophy at this levels. Balanced   vertebral arteries.    AORTIC ARCH: Classic aortic arch anatomy with no significant stenosis at the origin of the great vessels.    NONVASCULAR STRUCTURES: Unremarkable.    CT PERFUSION:  PERFUSION MAPS: Symmetrical cerebral perfusion. No focal deficits in cerebral blood flow or volume to  suggest ischemia/oligemia.    RAPID ANALYSIS:  CBF<30%: 0  Tmax>6sec: 0  Mismatch volume: 0  Mismatch ratio: 0      Impression    IMPRESSION:   HEAD CT:  1.  No CT evidence for acute intracranial process.  2.  Brain atrophy and presumed chronic microvascular ischemic changes as above.  3.  Left frontal and ethmoid sinusitis.    HEAD CTA:   1.  Normal CTA [place].    NECK CTA:  1.  No high-grade carotid or vertebral artery stenosis, occlusion, or dissection.  2.  Mild to moderate stenoses of the bilateral vertebral arteries at the C3-C4 level secondary to severe facet and uncovertebral hypertrophy at this level.    CT PERFUSION:  1.  Normal cerebral perfusion.    2.  I discussed the negatives stroke findings with Dr. Perez at 7:05 PM 10/23/2022   MR Brain w/o Contrast    Narrative    EXAM: MR BRAIN W/O CONTRAST  LOCATION: [facility]  DATE/TIME: 10/24/2022 5:49 PM    INDICATION: falls, confusion, positive romberg.  eval for CVA  COMPARISON: CT head 10/23/2022  TECHNIQUE: Routine multiplanar multisequence head MRI without intravenous contrast.    FINDINGS:  INTRACRANIAL CONTENTS: No acute or subacute infarct. No mass, acute hemorrhage, or extra-axial fluid collections. Scattered nonspecific T2/FLAIR hyperintensities within the cerebral white matter most consistent with mild chronic microvascular ischemic   change. Moderate generalized cerebral atrophy. No hydrocephalus. Normal position of the cerebellar tonsils.     SELLA: No abnormality accounting for technique.    OSSEOUS STRUCTURES/SOFT TISSUES: Normal marrow signal. The major intracranial vascular flow voids are maintained.     ORBITS: No abnormality accounting for technique.     SINUSES/MASTOIDS: Mild mucosal thickening scattered about the paranasal sinuses. Mild opacification right mastoid air cells.       Impression    IMPRESSION:  1.  No acute infarct, mass, mass effect, or hemorrhage.  2.  Mild to moderate chronic small vessel  ischemia.  3.  Moderate atrophy.   CT Chest Pulmonary Embolism w Contrast    Narrative    CT CHEST PULMONARY EMBOLISM WITH CONTRAST  10/24/2022 2:29 PM    HISTORY: Covid, hypoxia, rule out pulmonary embolism      TECHNIQUE: Scans obtained from the apices through the diaphragm with  IV contrast. 61mL Isovue-370 IV injected. Radiation dose for this scan  was reduced using automated exposure control, adjustment of the mA  and/or kV according to patient size, or iterative reconstruction  technique.    COMPARISON:  Chest x-ray on 10/23/2022    FINDINGS:  Chest/mediastinum: No evidence of pulmonary embolism. Cardiomegaly. No  significant pericardial effusion. No significant atherosclerotic  vascular calcification of the coronary arteries. Multiple prominent  mediastinal and hilar lymph nodes, likely reactive.    Lungs and pleura: No pleural effusion or pneumothorax. Basilar  pulmonary opacities, likely atelectasis.    Upper abdomen: Limited evaluation of upper abdomen due to lack of  coverage and timing of contrast.    Bones and soft tissue: Multilevel degenerative changes of the spine.  No suspicious osseous lesion.      Impression    IMPRESSION:   1. No evidence of pulmonary embolism.  2. Cardiomegaly.  3. Mild bibasilar pulmonary opacities, likely atelectasis.    LORRIE MODI MD         SYSTEM ID:  [id number]   Echo Limited     Value    LVEF  >70%    Narrative    [id number]  [facility]  [id number]  [id number]^JOSELYN^JOE^[facility]  Echocardiography Laboratory  [address] [place] [address]     Name: KIM VU  MRN: [id number]  : 1940  Study Date: 10/24/2022 07:51 AM  Age: 82 yrs  Gender: Female  Patient Location: [facility]  Reason For Study: Syncope  Ordering Physician: JOE ALVES  Performed By: Miesha Vincent     BSA: 1.8 m2  Height: 63 in  Weight: 173 lb  HR:  67  ______________________________________________________________________________  Procedure  Limited Portable Echo Adult.  ______________________________________________________________________________  Interpretation Summary     Technically limited study due to patient body habitus.     Hyperdynamic left ventricular function. The visual ejection fraction is >70%.  Endocardial borders are not optimally visualized, however no clear wall motion  abnormalities are seen.  Right ventricle is not well visualized, however global systolic function is  probably normal.  Valves not well visualized, assessment limited. There is trace aortic  regurgitation.     There are no prior studies available for comparison.  ______________________________________________________________________________  Left Ventricle  The left ventricle is not well visualized. Hyperdynamic left ventricular  function. The visual ejection fraction is >70%. Regional wall motion  abnormalities cannot be excluded due to limited visualization. Endocardial  borders are not optimally visualized, however no clear wall motion  abnormalities are seen.     Right Ventricle  The right ventricle is not well visualized. Right ventricle is not well  visualized, however global systolic function is probably normal.     Mitral Valve  There is trace mitral regurgitation.     Tricuspid Valve  The tricuspid valve is not well visualized. The tricuspid valve is not well  visualized, but is grossly normal. Right ventricular systolic pressure could  not be approximated due to inadequate tricuspid regurgitation.     Aortic Valve  The aortic valve is not well visualized. There is trace aortic regurgitation.     Pulmonic Valve  The pulmonic valve is not well visualized.     Vessels  The aortic root is not well visualized. The inferior vena cava cannot be  assessed.     Pericardium  The pericardium is not well visualized.      ______________________________________________________________________________  Doppler Measurements & Calculations  MV E max maria d: 68.7 cm/sec  MV A max maria d: 123.7 cm/sec  MV E/A: 0.56  MV max P.9 mmHg  MV mean P.0 mmHg  MV V2 VTI: 30.9 cm     MV dec time: 0.29 sec  E/E' avg: 10.3  Lateral E/e': 9.7  Medial E/e': 10.9     ______________________________________________________________________________  Report approved by: Nataly Yun 10/24/2022 09:35 AM               Transthoracic Echocardiogram Results:  No results found for this or any previous visit (from the past 4320 hour(s)).             Pending Results:     Unresulted Labs Ordered in the Past 30 Days of this Admission     Date and Time Order Name Status Description    10/23/2022  5:51 PM Blood Culture Arm, Left Preliminary     10/23/2022  5:51 PM Blood Culture Arm, Left Preliminary                       Discharge Instructions and Follow-Up:     Discharge instructions and follow-up:   Discharge Procedure Orders   Home Care Referral   Referral Priority: Routine: Next available opening Referral Type: Home Health Therapies & Aides   Number of Visits Requested: 1     Reason for your hospital stay   Order Comments: COVID.     Activity   Order Comments: Your activity upon discharge: activity as tolerated     Order Specific Question Answer Comments   Is discharge order? Yes      Follow-up and recommended labs and tests    Order Comments: Follow up with primary care provider, [facility], within 7 days for hospital follow- up.  No follow up labs or test are needed.  Isolation for 7 more days.     No CPR- Pre-arrest intubation OK     Order Specific Question Answer Comments   Code status determined by: Discussion with patient/ legal decision maker      Diet   Order Comments: Follow this diet upon discharge: Orders Placed This Encounter      Combination Diet Regular Diet Adult     Order Specific Question Answer Comments   Is discharge order? Yes               Hospital Course:     82-year-old female with a history of anxiety, presented to [facility] following an episode of falling and some confusion.  The patient had been coughing for a couple of days.  She resides with her granddaughter as well as her family who all have COVID-19 infection.  The patient is fully vaccinated and has received a booster for COVID-19 prevention.  The patient was found down on the ground at about 4:45 on the day of admission.  She did not recall the series of events leading to the fall.  It is unclear if she had a syncopal episode versus mechanical fall.  The patient was noted to be confused by her granddaughter as well as some slurred speech, so EMS was called and brought her to the Emergency Department.    In the Emergency Department, she has stable vital signs, except for mild hypoxia at 88% on room air.  She had a low-grade temperature of 100.2.  Labs were otherwise fairly unremarkable.  COVID-19 testing was positive.  She was placed in isolation and started on dexamethasone.  Creatinine and liver function tests are normal, so she was also started on remdesivir per her consent.  Head CT and CTA of the neck were negative and stroke neurology was contacted.  Neurology has low suspicion for stroke, so MRI was recommended, which was ultimately performed and showed no evidence of acute infarct or other significant abnormalities.  Mental status did improve and essentially has been back to baseline.    The patient improved quite impressively.  She has now been off of oxygen and done well on room air for several days.  Her energy is much improved.  She has worked with physical therapy, who has cleared her for discharge home with home therapies.  She does have 24/7 supervision with close monitoring by her granddaughter and other family members.  She was on enoxaparin for deep venous thrombosis prophylaxis during the hospitalization, but given that she is showing good improvement, I  will discontinue all COVID-related treatments including dexamethasone, remdesivir and anticoagulation.  I have recommended 7 more days of isolation.    The patient was seen, examined, and counseled on this day. The hospitalization and plan of care was reviewed with the patient and son Gonzales navarro. All questions were addressed and the patient agreed to follow-up as noted above.      Total time spent in face to face contact with the patient and coordinating discharge was:  35 Minutes    Brennan Villanueva DO, MPH  [facility] Hospitalist  [address] [place] [address]  10/26/2022    D: 10/26/2022   T: 10/26/2022   MT: [email or web address]    Name:     KIM VU  MRN:      [id number]        Account:      [id number]   :      1940           Service Date: 10/26/2022       Document: [id number]

## 2022-10-26 NOTE — PLAN OF CARE
Goal Outcome Evaluation:    A/O x3  VSS ex elevated BP on RA. Up with 1 assist. Denied CP. Will continue POC.

## 2022-10-27 NOTE — PLAN OF CARE
Physical Therapy Discharge Summary     Reason for therapy discharge:    Discharged to home with home PT     Progress towards therapy goal(s). See goals on Care Plan in [facility] electronic health record for goal details.  Goals not met.  Barriers to achieving goals:  discharge home with home PT    Therapy recommendation(s):    Continued therapy is recommended.  Rationale/Recommendations:  Pt is below baseline with functional mobility and strength and would benefit from continued PT to progress skills.

## 2022-10-28 ENCOUNTER — PATIENT OUTREACH (OUTPATIENT)
Dept: CARE COORDINATION | Facility: CLINIC | Age: 82
End: 2022-10-28

## 2022-10-28 LAB
BACTERIA BLD CULT: NO GROWTH
BACTERIA BLD CULT: NO GROWTH

## 2022-10-28 NOTE — PROGRESS NOTES
Connected Care Resource Center Contact  [facility]/Voicemail     Clinical Data: Transitional Care Management Outreach     Outreach attempted x 2.  Left message on patient's voicemail, providing [facility]'s 24/7 scheduling and nurse triage phone number [phone] ([phone]) for questions/concerns and/or to schedule an appt with an [facility] provider, if they do not have a PCP.      Plan:  [facility] will do no further outreaches at this time.       Zahraa Luciano  Connected [facility] Resource Center, [facility]    *Connected Care Resource Team does NOT follow patient ongoing. Referrals are identified based on internal discharge reports and the outreach is to ensure patient has an understanding of their discharge instructions.

## 2022-12-12 ENCOUNTER — OFFICE VISIT (OUTPATIENT)
Dept: DERMATOLOGY | Facility: CLINIC | Age: 82
End: 2022-12-12
Payer: COMMERCIAL

## 2022-12-12 DIAGNOSIS — L29.9 PRURITUS, UNSPECIFIED: ICD-10-CM

## 2022-12-12 DIAGNOSIS — L50.9 HIVES: ICD-10-CM

## 2022-12-12 DIAGNOSIS — L57.0 ACTINIC KERATOSIS: Primary | ICD-10-CM

## 2022-12-12 LAB — TSH SERPL DL<=0.005 MIU/L-ACNC: 1.54 UIU/ML (ref 0.3–4.2)

## 2022-12-12 PROCEDURE — 99000 SPECIMEN HANDLING OFFICE-LAB: CPT | Performed by: STUDENT IN AN ORGANIZED HEALTH CARE EDUCATION/TRAINING PROGRAM

## 2022-12-12 PROCEDURE — 36415 COLL VENOUS BLD VENIPUNCTURE: CPT | Performed by: STUDENT IN AN ORGANIZED HEALTH CARE EDUCATION/TRAINING PROGRAM

## 2022-12-12 PROCEDURE — 84443 ASSAY THYROID STIM HORMONE: CPT | Performed by: STUDENT IN AN ORGANIZED HEALTH CARE EDUCATION/TRAINING PROGRAM

## 2022-12-12 PROCEDURE — 17003 DESTRUCT PREMALG LES 2-14: CPT | Performed by: STUDENT IN AN ORGANIZED HEALTH CARE EDUCATION/TRAINING PROGRAM

## 2022-12-12 PROCEDURE — 83516 IMMUNOASSAY NONANTIBODY: CPT | Mod: 90 | Performed by: STUDENT IN AN ORGANIZED HEALTH CARE EDUCATION/TRAINING PROGRAM

## 2022-12-12 PROCEDURE — 17000 DESTRUCT PREMALG LESION: CPT | Performed by: STUDENT IN AN ORGANIZED HEALTH CARE EDUCATION/TRAINING PROGRAM

## 2022-12-12 PROCEDURE — 99204 OFFICE O/P NEW MOD 45 MIN: CPT | Mod: 25 | Performed by: STUDENT IN AN ORGANIZED HEALTH CARE EDUCATION/TRAINING PROGRAM

## 2022-12-12 NOTE — PATIENT INSTRUCTIONS
Switch to allegra it has a longer half life    Start off 1 pill in the morning and 1 pill at night  Go up to 2 pills in the morning and 2 pills at night after ~3-4 days if 1 pill is not cutting it    We will check some blood work to see if there is an underlying explanation for the hives     CRYOTHERAPY    What is it?  Use of a very cold liquid, such as liquid nitrogen, to freeze and destroy abnormal skin cells that need to be removed    What should I expect?  Tenderness and redness  A small blister that might grow and fill with dark purple blood.  More than one treatment may be needed if the lesions do not go away.    How do I care for the treated area?  Gently wash the area with your hands when bathing.  Use a thin layer of VaselineÆ to help with healing.   The area should heal within 7-10 days.  Do not use an antibiotic or NeosporinÆ ointment.   You may take acetaminophen (TylenolÆ) for pain.     Call your Doctor if you have:  Severe pain  Signs of infection (warmth, redness, cloudy yellow drainage, and or a bad smell)  Questions or concerns

## 2022-12-12 NOTE — LETTER
12/12/2022         RE: Keisha Kolb  [address] [facility] [address]        Dear Colleague,    Thank you for referring your patient, Keisha Kolb, to the [facility]. Please see a copy of my visit note below.    [facility] Dermatology Note    Encounter Date: Dec 12, 2022    Dermatology Problem List:  # HIVes  12/12/22     Major PMHx  #cognitive impairment  ______________________________________    Impression/Plan:  Keisha was seen today for derm problem.    Diagnoses and all orders for this visit:    Actinic keratosis  -     DESTRUCT PREMALIGNANT LESION, FIRST  -     DESTRUCT PREMALIGNANT LESION, 2-14  - 6 aks on dorsal hands  - see procedure note    Hives  -     Adult Dermatology Referral  -     TSH with free T4 reflex  -     Anti IgE Antibody; Future  - chronic intermittent  - Response to antihistamines was given hydroxyzine but told she could use this long-term, currently using Zyrtec twice a day but has itching worse at nighttime  - Switch to Allegra 180 mg up to 4 a day preferred for its longer half-life  - Check TSH and anti-IgE antibodies  - We will trial this increased dose of antihistamines and if not effective consider other treatments like hydroxychloroquine, mycophenolate, omalizumab. Declines these tx for now  -To message me on MyChart in 1 month if this regimen not effective can discuss these other treatments at that time so that when we see her back in 2 months she is at a month of the new treatment    Pruritus, unspecified  -     TSH with free T4 reflex        Cryotherapy procedure note: After verbal consent and discussion of risks and benefits including but no limited to dyspigmentation/scar, blister, and pain, 6 aks on hands was(were) treated with 1-2mm freeze border for 2 cycles with liquid nitrogen. Post cryotherapy instructions were provided.       Follow-up in 2 mo.       Staff Involved:  Staff Only    Romulo  MD Jillian   of Dermatology  Department of Dermatology  [facility] [employer]      CC:   Chief Complaint   Patient presents with     Derm Problem     Fbs   Hives        History of Present Illness:  Ms. Keisha Kolb is a 82 year old female who presents as a new patient.    Hives  No nsaids    Saw allergy in September recommended antihistamines    Labs:      Physical exam:  Vitals: There were no vitals taken for this visit.  GEN: well developed, well-nourished, in no acute distress, in a pleasant mood.     SKIN: Gregory phototype 1  - Full skin, which includes the head/face, both arms, chest, back, abdomen,both legs, genitalia and/or groin buttocks, digits and/or nails, was examined.  -Scattered urticarial plaques on trunk and extremities  - Gritty pink papules on bilateral dorsal hands  - Scattered brown macules on trunk and extremities  - No other lesions of concern on areas examined.     Past Medical History:   Past Medical History:   Diagnosis Date     Anxiety      Past Surgical History:   Procedure Laterality Date     masectomy         Social History:   reports that she has never smoked. She does not have any smokeless tobacco history on file. She reports that she does not drink alcohol and does not use drugs.    Family History:  History reviewed. No pertinent family history.    Medications:  Current Outpatient Medications   Medication Sig Dispense Refill     acetaminophen (TYLENOL) 325 MG tablet Take 2 tablets (650 mg) by mouth every 6 hours as needed for mild pain or other (and adjunct with moderate or severe pain or per patient request)       cetirizine (ZYRTEC) 10 MG tablet Take 10 mg by mouth 2 times daily       CITALOPRAM HYDROBROMIDE PO Take 40 mg by mouth At Bedtime       EPINEPHrine (ANY BX GENERIC EQUIV) 0.3 MG/0.3ML injection 2-pack Inject 0.3 mLs (0.3 mg) into the muscle once as needed for anaphylaxis May repeat one time in 5-15 minutes if response  to initial dose is inadequate. 2 each 0     famotidine (PEPCID) 20 MG tablet Take 20 mg by mouth as needed 1 tablet daily prn with zyrtec       LORAZEPAM PO Take 1 mg by mouth every morning       SIMVASTATIN PO Take 20 mg by mouth At Bedtime       triamcinolone (KENALOG) 0.1 % external cream Apply topically 2 times daily as needed for irritation (itchiness - don't place over open wounds) 28.4 g 0     vitamin D3 (CHOLECALCIFEROL) 50 mcg (2000 units) tablet Take 1 tablet by mouth daily       No Known Allergies                Again, thank you for allowing me to participate in the care of your patient.        Sincerely,        Romulo Walsh MD

## 2022-12-12 NOTE — PROGRESS NOTES
[facility] Dermatology Note    Encounter Date: Dec 12, 2022    Dermatology Problem List:  # HIVes  12/12/22     Major PMHx  #cognitive impairment  ______________________________________    Impression/Plan:  Keisha was seen today for derm problem.    Diagnoses and all orders for this visit:    Actinic keratosis  -     DESTRUCT PREMALIGNANT LESION, FIRST  -     DESTRUCT PREMALIGNANT LESION, 2-14  - 6 aks on dorsal hands  - see procedure note    Hives  -     Adult Dermatology Referral  -     TSH with free T4 reflex  -     Anti IgE Antibody; Future  - chronic intermittent  - Response to antihistamines was given hydroxyzine but told she could use this long-term, currently using Zyrtec twice a day but has itching worse at nighttime  - Switch to Allegra 180 mg up to 4 a day preferred for its longer half-life  - Check TSH and anti-IgE antibodies  - We will trial this increased dose of antihistamines and if not effective consider other treatments like hydroxychloroquine, mycophenolate, omalizumab. Declines these tx for now  -To message me on MyChart in 1 month if this regimen not effective can discuss these other treatments at that time so that when we see her back in 2 months she is at a month of the new treatment    Pruritus, unspecified  -     TSH with free T4 reflex        Cryotherapy procedure note: After verbal consent and discussion of risks and benefits including but no limited to dyspigmentation/scar, blister, and pain, 6 aks on hands was(were) treated with 1-2mm freeze border for 2 cycles with liquid nitrogen. Post cryotherapy instructions were provided.       Follow-up in 2 mo.       Staff Involved:  Staff Only    Romulo Walsh MD   of Dermatology  Department of Dermatology  [facility] [employer] [facility]      CC:   Chief Complaint   Patient presents with     Derm Problem     Fbscs   Hives        History of Present Illness:  Ms. Keisha Kolb is a 82 year old  female who presents as a new patient.    Hives  No nsaids    Saw allergy in September recommended antihistamines    Labs:      Physical exam:  Vitals: There were no vitals taken for this visit.  GEN: well developed, well-nourished, in no acute distress, in a pleasant mood.     SKIN: Gregory phototype 1  - Full skin, which includes the head/face, both arms, chest, back, abdomen,both legs, genitalia and/or groin buttocks, digits and/or nails, was examined.  -Scattered urticarial plaques on trunk and extremities  - Gritty pink papules on bilateral dorsal hands  - Scattered brown macules on trunk and extremities  - No other lesions of concern on areas examined.     Past Medical History:   Past Medical History:   Diagnosis Date     Anxiety      Past Surgical History:   Procedure Laterality Date     masectomy         Social History:   reports that she has never smoked. She does not have any smokeless tobacco history on file. She reports that she does not drink alcohol and does not use drugs.    Family History:  History reviewed. No pertinent family history.    Medications:  Current Outpatient Medications   Medication Sig Dispense Refill     acetaminophen (TYLENOL) 325 MG tablet Take 2 tablets (650 mg) by mouth every 6 hours as needed for mild pain or other (and adjunct with moderate or severe pain or per patient request)       cetirizine (ZYRTEC) 10 MG tablet Take 10 mg by mouth 2 times daily       CITALOPRAM HYDROBROMIDE PO Take 40 mg by mouth At Bedtime       EPINEPHrine (ANY BX GENERIC EQUIV) 0.3 MG/0.3ML injection 2-pack Inject 0.3 mLs (0.3 mg) into the muscle once as needed for anaphylaxis May repeat one time in 5-15 minutes if response to initial dose is inadequate. 2 each 0     famotidine (PEPCID) 20 MG tablet Take 20 mg by mouth as needed 1 tablet daily prn with zyrtec       LORAZEPAM PO Take 1 mg by mouth every morning       SIMVASTATIN PO Take 20 mg by mouth At Bedtime       triamcinolone (KENALOG) 0.1 %  external cream Apply topically 2 times daily as needed for irritation (itchiness - don't place over open wounds) 28.4 g 0     vitamin D3 (CHOLECALCIFEROL) 50 mcg (2000 units) tablet Take 1 tablet by mouth daily       No Known Allergies

## 2022-12-22 LAB — ANTI IGE ANTIBODY: NORMAL

## 2022-12-23 ENCOUNTER — TELEPHONE (OUTPATIENT)
Dept: DERMATOLOGY | Facility: CLINIC | Age: 82
End: 2022-12-23

## 2022-12-23 NOTE — TELEPHONE ENCOUNTER
----- Message from Romulo Walsh MD sent at 12/22/2022  7:16 AM CST -----  Pt informed via [place]. No further action necessary.     The labs were negative for any autoimmune cause of the hives

## 2023-02-15 ENCOUNTER — OFFICE VISIT (OUTPATIENT)
Dept: DERMATOLOGY | Facility: CLINIC | Age: 83
End: 2023-02-15
Payer: COMMERCIAL

## 2023-02-15 DIAGNOSIS — L57.0 ACTINIC KERATOSIS: ICD-10-CM

## 2023-02-15 DIAGNOSIS — L50.9 URTICARIA: Primary | ICD-10-CM

## 2023-02-15 DIAGNOSIS — D48.5 NEOPLASM OF UNCERTAIN BEHAVIOR OF SKIN: ICD-10-CM

## 2023-02-15 PROCEDURE — 17000 DESTRUCT PREMALG LESION: CPT | Mod: XS | Performed by: STUDENT IN AN ORGANIZED HEALTH CARE EDUCATION/TRAINING PROGRAM

## 2023-02-15 PROCEDURE — 88305 TISSUE EXAM BY PATHOLOGIST: CPT | Performed by: DERMATOLOGY

## 2023-02-15 PROCEDURE — 17003 DESTRUCT PREMALG LES 2-14: CPT | Performed by: STUDENT IN AN ORGANIZED HEALTH CARE EDUCATION/TRAINING PROGRAM

## 2023-02-15 PROCEDURE — 99214 OFFICE O/P EST MOD 30 MIN: CPT | Mod: 25 | Performed by: STUDENT IN AN ORGANIZED HEALTH CARE EDUCATION/TRAINING PROGRAM

## 2023-02-15 PROCEDURE — 11102 TANGNTL BX SKIN SINGLE LES: CPT | Performed by: STUDENT IN AN ORGANIZED HEALTH CARE EDUCATION/TRAINING PROGRAM

## 2023-02-15 NOTE — PROGRESS NOTES
[facility] Dermatology Note    Encounter Date: Feb 15, 2023    Dermatology Problem List:  #AKs    #Hives  - TSH anti IGE negative. Chronic intermittent. Respond to antihistamines. Allegra 4x daily    Major PMHx  -   ______________________________________    Impression/Plan:  Keisha was seen today for derm problem.    Diagnoses and all orders for this visit:    Urticaria  - Stable continue allegra 360 mg BID    Neoplasm of uncertain behavior of skin  -     Dermatological Path Order and Indications; Standing  -     Dermatological Path Order and Indications  - R dorsal hand ddx SCC vs other  - see procedure note    AKs  - b/l dorsal hands  - see procedure note  - discussed sunprotective behaviors, clothing, and the use of sunscreen        Cryotherapy procedure note: After verbal consent and discussion of risks and benefits including but no limited to dyspigmentation/scar, blister, and pain, 4 aks on b/l dorsal hands was(were) treated with 1-2mm freeze border for 2 cycles with liquid nitrogen. Post cryotherapy instructions were provided.    and     - SHAVE BIOPSY PROCEDURE NOTE: After written informed consent was obtained, a time out was taken to identify the patient and the correct site for biopsy. The lesion on the R dorsal ahnd was cleansed with a 70% isopropyl alcohol wipe, and then injected with 2cc of lidocaine 1% with epinephrine 1:100,000. Once anesthesia was ensured, the visible surface of the lesion was biopsied using a [place] blade in standard technique. Hemostasis was obtained with pressure and aluminum chloride 20% solution. The specimen was placed in a labeled formalin container and sent to pathology for sectioning and analysis. The wound was dressed with white petrolatum and an adhesive bandage. The patient tolerated the procedure well. Post-procedure instructions and recommendations were provided both verbally and in writing.    Follow-up in 6 mo.       Staff Involved:  Staff  Only    Romulo Walsh MD   of Dermatology  Department of Dermatology  [facility]      CC:   Chief Complaint   Patient presents with     Derm Problem     2 month follow up       History of Present Illness:  Ms. Keisha Kolb is a 82 year old female who presents as a return patient.    Persistent areas of scaling on dorsal hands.  Persistent indurated pink papule/nodule on dorsal hand.    Urticaria and itching significantly improved with Allegra taking 2 pills twice daily with good control no anticholinergic side effects    Labs:      Physical exam:  Vitals: There were no vitals taken for this visit.  GEN: well developed, well-nourished, in no acute distress, in a pleasant mood.     SKIN: Gregory phototype 1  - Sun-exposed skin, which includes the head/face, neck, both arms, digits, and/or nails was examined.   - gritty pink papules on dorsal hands  - 8mm pink scaly papule R dorsal hand  - no visible hives  - No other lesions of concern on areas examined.     Past Medical History:   Past Medical History:   Diagnosis Date     Anxiety      Past Surgical History:   Procedure Laterality Date     masectomy         Social History:   reports that she has never smoked. She does not have any smokeless tobacco history on file. She reports that she does not drink alcohol and does not use drugs.    Family History:  No family history on file.    Medications:  Current Outpatient Medications   Medication Sig Dispense Refill     acetaminophen (TYLENOL) 325 MG tablet Take 2 tablets (650 mg) by mouth every 6 hours as needed for mild pain or other (and adjunct with moderate or severe pain or per patient request)       cetirizine (ZYRTEC) 10 MG tablet Take 10 mg by mouth 2 times daily       CITALOPRAM HYDROBROMIDE PO Take 40 mg by mouth At Bedtime       EPINEPHrine (ANY BX GENERIC EQUIV) 0.3 MG/0.3ML injection 2-pack Inject 0.3 mLs (0.3 mg) into the muscle once as needed for  anaphylaxis May repeat one time in 5-15 minutes if response to initial dose is inadequate. 2 each 0     famotidine (PEPCID) 20 MG tablet Take 20 mg by mouth as needed 1 tablet daily prn with zyrtec       LORAZEPAM PO Take 1 mg by mouth every morning       SIMVASTATIN PO Take 20 mg by mouth At Bedtime       triamcinolone (KENALOG) 0.1 % external cream Apply topically 2 times daily as needed for irritation (itchiness - don't place over open wounds) 28.4 g 0     vitamin D3 (CHOLECALCIFEROL) 50 mcg (2000 units) tablet Take 1 tablet by mouth daily       No Known Allergies

## 2023-02-15 NOTE — PATIENT INSTRUCTIONS

## 2023-02-15 NOTE — LETTER
2/15/2023         RE: Keisha Kolb  [address] [place] [facility] [address]        Dear Colleague,    Thank you for referring your patient, Keisha Kolb, to the [facility]. Please see a copy of my visit note below.    [facility] Dermatology Note    Encounter Date: Feb 15, 2023    Dermatology Problem List:  #AKs    #Hives  - TSH anti IGE negative. Chronic intermittent. Respond to antihistamines. Allegra 4x daily    Major PMHx  -   ______________________________________    Impression/Plan:  Keisha was seen today for derm problem.    Diagnoses and all orders for this visit:    Urticaria  - Stable continue allegra 360 mg BID    Neoplasm of uncertain behavior of skin  -     Dermatological Path Order and Indications; Standing  -     Dermatological Path Order and Indications  - R dorsal hand ddx SCC vs other  - see procedure note    AKs  - b/l dorsal hands  - see procedure note  - discussed sunprotective behaviors, clothing, and the use of sunscreen        Cryotherapy procedure note: After verbal consent and discussion of risks and benefits including but no limited to dyspigmentation/scar, blister, and pain, 4 aks on b/l dorsal hands was(were) treated with 1-2mm freeze border for 2 cycles with liquid nitrogen. Post cryotherapy instructions were provided.    and     - SHAVE BIOPSY PROCEDURE NOTE: After written informed consent was obtained, a time out was taken to identify the patient and the correct site for biopsy. The lesion on the R dorsal ahnd was cleansed with a 70% isopropyl alcohol wipe, and then injected with 2cc of lidocaine 1% with epinephrine 1:100,000. Once anesthesia was ensured, the visible surface of the lesion was biopsied using a [place] blade in standard technique. Hemostasis was obtained with pressure and aluminum chloride 20% solution. The specimen was placed in a labeled formalin container and sent to pathology for sectioning  and analysis. The wound was dressed with white petrolatum and an adhesive bandage. The patient tolerated the procedure well. Post-procedure instructions and recommendations were provided both verbally and in writing.    Follow-up in 6 mo.       Staff Involved:  Staff Only    Romulo Walsh MD   of Dermatology  Department of Dermatology  [facility]      CC:   Chief Complaint   Patient presents with     Derm Problem     2 month follow up       History of Present Illness:  Ms. Keisha Kolb is a 82 year old female who presents as a return patient.    Persistent areas of scaling on dorsal hands.  Persistent indurated pink papule/nodule on dorsal hand.    Urticaria and itching significantly improved with Allegra taking 2 pills twice daily with good control no anticholinergic side effects    Labs:      Physical exam:  Vitals: There were no vitals taken for this visit.  GEN: well developed, well-nourished, in no acute distress, in a pleasant mood.     SKIN: Gregory phototype 1  - Sun-exposed skin, which includes the head/face, neck, both arms, digits, and/or nails was examined.   - gritty pink papules on dorsal hands  - 8mm pink scaly papule R dorsal hand  - no visible hives  - No other lesions of concern on areas examined.     Past Medical History:   Past Medical History:   Diagnosis Date     Anxiety      Past Surgical History:   Procedure Laterality Date     masectomy         Social History:   reports that she has never smoked. She does not have any smokeless tobacco history on file. She reports that she does not drink alcohol and does not use drugs.    Family History:  No family history on file.    Medications:  Current Outpatient Medications   Medication Sig Dispense Refill     acetaminophen (TYLENOL) 325 MG tablet Take 2 tablets (650 mg) by mouth every 6 hours as needed for mild pain or other (and adjunct with moderate or severe pain or per patient request)        cetirizine (ZYRTEC) 10 MG tablet Take 10 mg by mouth 2 times daily       CITALOPRAM HYDROBROMIDE PO Take 40 mg by mouth At Bedtime       EPINEPHrine (ANY BX GENERIC EQUIV) 0.3 MG/0.3ML injection 2-pack Inject 0.3 mLs (0.3 mg) into the muscle once as needed for anaphylaxis May repeat one time in 5-15 minutes if response to initial dose is inadequate. 2 each 0     famotidine (PEPCID) 20 MG tablet Take 20 mg by mouth as needed 1 tablet daily prn with zyrtec       LORAZEPAM PO Take 1 mg by mouth every morning       SIMVASTATIN PO Take 20 mg by mouth At Bedtime       triamcinolone (KENALOG) 0.1 % external cream Apply topically 2 times daily as needed for irritation (itchiness - don't place over open wounds) 28.4 g 0     vitamin D3 (CHOLECALCIFEROL) 50 mcg (2000 units) tablet Take 1 tablet by mouth daily       No Known Allergies                Again, thank you for allowing me to participate in the care of your patient.        Sincerely,        Romulo Walsh MD

## 2023-02-17 ENCOUNTER — TELEPHONE (OUTPATIENT)
Dept: DERMATOLOGY | Facility: CLINIC | Age: 83
End: 2023-02-17
Payer: COMMERCIAL

## 2023-02-17 LAB
PATH REPORT.COMMENTS IMP SPEC: ABNORMAL
PATH REPORT.COMMENTS IMP SPEC: ABNORMAL
PATH REPORT.COMMENTS IMP SPEC: YES
PATH REPORT.FINAL DX SPEC: ABNORMAL
PATH REPORT.GROSS SPEC: ABNORMAL
PATH REPORT.MICROSCOPIC SPEC OTHER STN: ABNORMAL
PATH REPORT.RELEVANT HX SPEC: ABNORMAL

## 2023-02-17 NOTE — TELEPHONE ENCOUNTER
----- Message from Romulo Walsh MD sent at 2/17/2023  2:21 PM CST -----  Please call and schedule for mohs w/ Dr. Parkinson. Thank you!    A. Right dorsal hand:  - Squamous cell carcinoma, well-differentiated - (see description)

## 2023-02-17 NOTE — TELEPHONE ENCOUNTER
Called patient:  Patient notified and educated on test results   Mohs procedure explained- all questions answered  appointment scheduled- mohs packet mailed.     Thank you,  Devi FARRELL RN  Dermatology   [phone]

## 2023-02-23 ENCOUNTER — HOSPITAL ENCOUNTER (EMERGENCY)
Facility: CLINIC | Age: 83
Discharge: HOME | End: 2023-02-23
Attending: EMERGENCY MEDICINE | Admitting: EMERGENCY MEDICINE
Payer: COMMERCIAL

## 2023-02-23 ENCOUNTER — APPOINTMENT (OUTPATIENT)
Dept: GENERAL RADIOLOGY | Facility: CLINIC | Age: 83
End: 2023-02-23
Attending: EMERGENCY MEDICINE
Payer: COMMERCIAL

## 2023-02-23 VITALS
RESPIRATION RATE: 24 BRPM | SYSTOLIC BLOOD PRESSURE: 144 MMHG | HEART RATE: 89 BPM | DIASTOLIC BLOOD PRESSURE: 72 MMHG | TEMPERATURE: 97.8 F | OXYGEN SATURATION: 95 %

## 2023-02-23 DIAGNOSIS — J40 BRONCHITIS: ICD-10-CM

## 2023-02-23 LAB
ANION GAP SERPL CALCULATED.3IONS-SCNC: 9 MMOL/L (ref 7–15)
BASOPHILS # BLD AUTO: 0 10E3/UL (ref 0–0.2)
BASOPHILS NFR BLD AUTO: 0 %
BUN SERPL-MCNC: 18.1 MG/DL (ref 8–23)
CALCIUM SERPL-MCNC: 8.9 MG/DL (ref 8.8–10.2)
CHLORIDE SERPL-SCNC: 101 MMOL/L (ref 98–107)
CREAT SERPL-MCNC: 0.84 MG/DL (ref 0.51–0.95)
DEPRECATED HCO3 PLAS-SCNC: 27 MMOL/L (ref 22–29)
EOSINOPHIL # BLD AUTO: 0.3 10E3/UL (ref 0–0.7)
EOSINOPHIL NFR BLD AUTO: 4 %
ERYTHROCYTE [DISTWIDTH] IN BLOOD BY AUTOMATED COUNT: 13.7 % (ref 10–15)
FLUAV RNA SPEC QL NAA+PROBE: NEGATIVE
FLUBV RNA RESP QL NAA+PROBE: NEGATIVE
GFR SERPL CREATININE-BSD FRML MDRD: 69 ML/MIN/1.73M2
GLUCOSE SERPL-MCNC: 106 MG/DL (ref 70–99)
HCT VFR BLD AUTO: 42 % (ref 35–47)
HGB BLD-MCNC: 13.6 G/DL (ref 11.7–15.7)
IMM GRANULOCYTES # BLD: 0 10E3/UL
IMM GRANULOCYTES NFR BLD: 1 %
LYMPHOCYTES # BLD AUTO: 1.7 10E3/UL (ref 0.8–5.3)
LYMPHOCYTES NFR BLD AUTO: 21 %
MCH RBC QN AUTO: 30.3 PG (ref 26.5–33)
MCHC RBC AUTO-ENTMCNC: 32.4 G/DL (ref 31.5–36.5)
MCV RBC AUTO: 94 FL (ref 78–100)
MONOCYTES # BLD AUTO: 0.6 10E3/UL (ref 0–1.3)
MONOCYTES NFR BLD AUTO: 8 %
NEUTROPHILS # BLD AUTO: 5.4 10E3/UL (ref 1.6–8.3)
NEUTROPHILS NFR BLD AUTO: 66 %
NRBC # BLD AUTO: 0 10E3/UL
NRBC BLD AUTO-RTO: 0 /100
PLATELET # BLD AUTO: 178 10E3/UL (ref 150–450)
POTASSIUM SERPL-SCNC: 4 MMOL/L (ref 3.4–5.3)
RBC # BLD AUTO: 4.49 10E6/UL (ref 3.8–5.2)
RSV RNA SPEC NAA+PROBE: NEGATIVE
SARS-COV-2 RNA RESP QL NAA+PROBE: NEGATIVE
SODIUM SERPL-SCNC: 137 MMOL/L (ref 136–145)
WBC # BLD AUTO: 8.2 10E3/UL (ref 4–11)

## 2023-02-23 PROCEDURE — 71046 X-RAY EXAM CHEST 2 VIEWS: CPT

## 2023-02-23 PROCEDURE — 250N000009 HC RX 250: Performed by: EMERGENCY MEDICINE

## 2023-02-23 PROCEDURE — 99285 EMERGENCY DEPT VISIT HI MDM: CPT | Mod: CS,25

## 2023-02-23 PROCEDURE — 94640 AIRWAY INHALATION TREATMENT: CPT

## 2023-02-23 PROCEDURE — 250N000013 HC RX MED GY IP 250 OP 250 PS 637: Performed by: EMERGENCY MEDICINE

## 2023-02-23 PROCEDURE — 93005 ELECTROCARDIOGRAM TRACING: CPT

## 2023-02-23 PROCEDURE — 87637 SARSCOV2&INF A&B&RSV AMP PRB: CPT | Performed by: EMERGENCY MEDICINE

## 2023-02-23 PROCEDURE — C9803 HOPD COVID-19 SPEC COLLECT: HCPCS

## 2023-02-23 PROCEDURE — 250N000012 HC RX MED GY IP 250 OP 636 PS 637: Performed by: EMERGENCY MEDICINE

## 2023-02-23 PROCEDURE — 36415 COLL VENOUS BLD VENIPUNCTURE: CPT | Performed by: EMERGENCY MEDICINE

## 2023-02-23 PROCEDURE — 85025 COMPLETE CBC W/AUTO DIFF WBC: CPT | Performed by: EMERGENCY MEDICINE

## 2023-02-23 PROCEDURE — 80048 BASIC METABOLIC PNL TOTAL CA: CPT | Performed by: EMERGENCY MEDICINE

## 2023-02-23 RX ORDER — PREDNISONE 20 MG/1
40 TABLET ORAL ONCE
Status: COMPLETED | OUTPATIENT
Start: 2023-02-23 | End: 2023-02-23

## 2023-02-23 RX ORDER — PREDNISONE 20 MG/1
TABLET ORAL
Qty: 10 TABLET | Refills: 0 | Status: SHIPPED | OUTPATIENT
Start: 2023-02-23 | End: 2023-06-23

## 2023-02-23 RX ORDER — ALBUTEROL SULFATE 90 UG/1
2 AEROSOL, METERED RESPIRATORY (INHALATION) EVERY 6 HOURS PRN
Qty: 18 G | Refills: 0 | Status: ON HOLD | OUTPATIENT
Start: 2023-02-23 | End: 2024-01-22

## 2023-02-23 RX ORDER — DOXYCYCLINE 100 MG/1
100 CAPSULE ORAL ONCE
Status: COMPLETED | OUTPATIENT
Start: 2023-02-23 | End: 2023-02-23

## 2023-02-23 RX ORDER — DOXYCYCLINE 100 MG/1
100 CAPSULE ORAL 2 TIMES DAILY
Qty: 12 CAPSULE | Refills: 0 | Status: SHIPPED | OUTPATIENT
Start: 2023-02-23 | End: 2023-03-01

## 2023-02-23 RX ORDER — IPRATROPIUM BROMIDE AND ALBUTEROL SULFATE 2.5; .5 MG/3ML; MG/3ML
3 SOLUTION RESPIRATORY (INHALATION)
Status: COMPLETED | OUTPATIENT
Start: 2023-02-23 | End: 2023-02-23

## 2023-02-23 RX ADMIN — DOXYCYCLINE HYCLATE 100 MG: 100 CAPSULE ORAL at 21:38

## 2023-02-23 RX ADMIN — IPRATROPIUM BROMIDE AND ALBUTEROL SULFATE 3 ML: .5; 3 SOLUTION RESPIRATORY (INHALATION) at 20:04

## 2023-02-23 RX ADMIN — IPRATROPIUM BROMIDE AND ALBUTEROL SULFATE 3 ML: .5; 3 SOLUTION RESPIRATORY (INHALATION) at 21:20

## 2023-02-23 RX ADMIN — PREDNISONE 40 MG: 20 TABLET ORAL at 21:38

## 2023-02-23 ASSESSMENT — ACTIVITIES OF DAILY LIVING (ADL): ADLS_ACUITY_SCORE: 35

## 2023-02-24 LAB
ATRIAL RATE - MUSE: 91 BPM
DIASTOLIC BLOOD PRESSURE - MUSE: NORMAL MMHG
INTERPRETATION ECG - MUSE: NORMAL
P AXIS - MUSE: 43 DEGREES
PR INTERVAL - MUSE: 138 MS
QRS DURATION - MUSE: 122 MS
QT - MUSE: 392 MS
QTC - MUSE: 482 MS
R AXIS - MUSE: 52 DEGREES
SYSTOLIC BLOOD PRESSURE - MUSE: NORMAL MMHG
T AXIS - MUSE: 9 DEGREES
VENTRICULAR RATE- MUSE: 91 BPM

## 2023-02-24 NOTE — ED TRIAGE NOTES
Pt BIBA from home for SOB since 0800. Granddaughter called EMS d/t worsening difficulty breathing. SOB worse w exertion. Pt 95% O2 on RA. Pt wheezing and dyspnea on exertion. Pt has hx of pneumonia since pt has covid. Duoneb given en route by EMS with little improvement. VSS except hypertensive. ABCs intact, A&Ox4, .     Triage Assessment     Row Name 02/1940       Triage Assessment (Adult)    Airway WDL WDL       Respiratory WDL    Respiratory WDL X;all    Rhythm/Pattern, Respiratory shortness of breath;dyspnea on exertion    Cough Frequency frequent       Skin Circulation/Temperature WDL    Skin Circulation/Temperature WDL WDL       Cardiac WDL    Cardiac WDL X;all       Chest Pain Assessment    Associated Signs/Symptoms hypertension       Peripheral/Neurovascular WDL    Peripheral Neurovascular WDL WDL       Cognitive/Neuro/Behavioral WDL    Cognitive/Neuro/Behavioral WDL WDL

## 2023-02-24 NOTE — DISCHARGE INSTRUCTIONS
I believe your diagnosis is most consistent with a bronchitis.  I have recommended using inhaler every 4-6 hours as needed for shortness of breath.  I also prescribed antibiotics and steroids to help with the bronchitis.  There is no signs of pneumonia.  Return the ER if having fevers, chills, chest pain, shortness of breath not improving with medications or other concerns.  Closely follow-up with your primary doctor ensure you are getting better.    Discharge Instructions  Bronchitis, Pneumonia, Bronchospasm    You were seen today for a chest infection or inflammation. If your provider decided this was due to a bacterial infection, you may need an antibiotic. Sometimes these are caused by a virus, and then an antibiotic will not help.     Generally, every Emergency Department visit should have a follow-up clinic visit with either a primary or a specialty clinic/provider. Please follow-up as instructed by your emergency provider today.    Return to the Emergency Department if:  Your breathing gets much worse.  You are very weak, or feel much more ill.  You develop new symptoms, such as chest pain.  You cough up blood.  You are vomiting (throwing up) enough that you cannot keep fluids or your medicine down.    What can I do to help myself?  Fill any prescriptions the provider gave you and take them right away--especially antibiotics. Be sure to finish the whole antibiotic prescription.  You may be given a prescription for an inhaler, which can help loosen tight air passages.  Use this as needed, but not more often than directed. Inhalers work much better when used with a spacer.   You may be given a prescription for a steroid to reduce inflammation. Used long-term, these can have side effects, but for short-term use they are safe. You may notice restlessness or increased appetite.      You may use non-prescription cough or cold medicines. Cough medicines may help, but don t make the cough go away completely.   Avoid  smoke, because this can make your symptoms worse. If you smoke, this may be a good time to quit! Consider using nicotine lozenges, gum, or patches to reduce cravings.   If you have a fever, Tylenol  (acetaminophen), Motrin  (ibuprofen), or Advil  (ibuprofen) may help bring fever down and may help you feel more comfortable. Be sure to read and follow the package directions, and ask your provider if you have questions.  Be sure to get your flu shot each year.  For certain ages, the pneumonia shot can help prevent pneumonia.  If you were given a prescription for medicine here today, be sure to read all of the information (including the package insert) that comes with your prescription.  This will include important information about the medicine, its side effects, and any warnings that you need to know about.  The pharmacist who fills the prescription can provide more information and answer questions you may have about the medicine.  If you have questions or concerns that the pharmacist cannot address, please call or return to the Emergency Department.     Remember that you can always come back to the Emergency Department if you are not able to see your regular provider in the amount of time listed above, if you get any new symptoms, or if there is anything that worries you.

## 2023-02-24 NOTE — ED PROVIDER NOTES
History     Chief Complaint:  Shortness of Breath       HPI   Keisha Kolb is a 82 year old female with a history of memory impairment who presents with shortness of breath.  Patient woke up today with cough, wheezing.  Throughout the day this worsened.  She had wheezing noted as well.  She difficulty walking across the room because she felt somewhat short of breath as well.  No fevers.  No chest pain.  She lives with her granddaughter who was concerned and called EMS to have her oxygen checked which was normal.  Despite this they recommend that she come into the ER.  Granddaughter does have a small child who has had a recent cold.  No other known sick contacts.  Patient was a smoker many years ago.  Not a current smoker.      Independent Historian:    justice, who is caregiver.    Review of External Notes: reviewed discharge summary from 10/26/22     ROS:  Review of Systems   All other systems reviewed and are negative.      Allergies:  No Known Allergies     Medications:    albuterol (PROAIR HFA/PROVENTIL HFA/VENTOLIN HFA) 108 (90 Base) MCG/ACT inhaler  doxycycline hyclate (VIBRAMYCIN) 100 MG capsule  predniSONE (DELTASONE) 20 MG tablet  acetaminophen (TYLENOL) 325 MG tablet  cetirizine (ZYRTEC) 10 MG tablet  CITALOPRAM HYDROBROMIDE PO  EPINEPHrine (ANY BX GENERIC EQUIV) 0.3 MG/0.3ML injection 2-pack  famotidine (PEPCID) 20 MG tablet  LORAZEPAM PO  SIMVASTATIN PO  triamcinolone (KENALOG) 0.1 % external cream  vitamin D3 (CHOLECALCIFEROL) 50 mcg (2000 units) tablet        Past Medical History:    Past Medical History:   Diagnosis Date     Anxiety        Past Surgical History:    Past Surgical History:   Procedure Laterality Date     masectomy          Family History:    family history is not on file.    Social History:   reports that she has never smoked. She does not have any smokeless tobacco history on file. She reports that she does not drink alcohol and does not use drugs.  PCP: [facility],  [facility] [place]     Physical Exam     Patient Vitals for the past 24 hrs:   BP Temp Temp src Pulse Resp SpO2   02/23/23 2130 -- -- -- -- -- 95 %   02/23/23 2120 -- -- -- -- -- 94 %   02/23/23 2110 -- -- -- -- -- 100 %   02/23/23 2100 (!) 144/72 -- -- 89 -- 92 %   02/23/23 2030 (!) 142/76 -- -- 93 -- 97 %   02/23/23 2000 (!) 153/75 -- -- 81 -- 93 %   [birth year]/1940 (!) 170/87 97.8  F (36.6  C) Oral 85 24 97 %        Physical Exam  General: Resting on the bed.  Head: No obvious trauma to head.  Ears, Nose, Throat:  External ears normal.  Nose normal.  No pharyngeal erythema, swelling or exudate.  Midline uvula.    Eyes:  Conjunctivae clear.  Pupils are equal, round, and reactive.   Neck: Normal range of motion.  Neck supple.   CV: Regular rate and rhythm.  No murmurs.      Respiratory: Effort normal, no retractions.  There are some scattered wheezing appreciable on the posterior aspect of lung examination.  No retractions, crackles.  Gastrointestinal: Soft.  No distension. There is no tenderness.  There is no rigidity, no rebound and no guarding.   Musculoskeletal: Non tender non edematous calves  Neuro: Alert. Moving all extremities appropriately.  Normal speech.    Skin: Skin is warm and dry.  No rash noted.       Emergency Department Course   ECG  ECG taken at 2038  Normal sinus rhythm.  Right bundle branch block.     When compared with prior ECG, dated 10/23/22, no changes from prior   Rate 91 bpm. MO interval 138 ms. QRS duration 122 ms. QT/QTc 392/484 ms. P-R-T axes     Imaging:  XR Chest 2 Views   Final Result   IMPRESSION: Moderate degenerative change thoracic spine. Chest otherwise negative. No change from prior.        Report per radiology    Laboratory:  Labs Ordered and Resulted from Time of ED Arrival to Time of ED Departure   BASIC METABOLIC PANEL - Abnormal       Result Value    Sodium 137      Potassium 4.0      Chloride 101      Carbon Dioxide (CO2) 27      Anion Gap 9      Urea Nitrogen 18.1       Creatinine 0.84      Calcium 8.9      Glucose 106 (*)     GFR Estimate 69     INFLUENZA A/B & SARS-COV2 PCR MULTIPLEX - Normal    Influenza A PCR Negative      Influenza B PCR Negative      RSV PCR Negative      SARS CoV2 PCR Negative     CBC WITH PLATELETS AND DIFFERENTIAL    WBC Count 8.2      RBC Count 4.49      Hemoglobin 13.6      Hematocrit 42.0      MCV 94      MCH 30.3      MCHC 32.4      RDW 13.7      Platelet Count 178      % Neutrophils 66      % Lymphocytes 21      % Monocytes 8      % Eosinophils 4      % Basophils 0      % Immature Granulocytes 1      NRBCs per 100 WBC 0      Absolute Neutrophils 5.4      Absolute Lymphocytes 1.7      Absolute Monocytes 0.6      Absolute Eosinophils 0.3      Absolute Basophils 0.0      Absolute Immature Granulocytes 0.0      Absolute NRBCs 0.0          Procedures     Emergency Department Course & Assessments:             Interventions:  Medications   ipratropium - albuterol 0.5 mg/2.5 mg/3 mL (DUONEB) neb solution 3 mL (3 mLs Nebulization $Given 23)   predniSONE (DELTASONE) tablet 40 mg (40 mg Oral $Given 23)   doxycycline hyclate (VIBRAMYCIN) capsule 100 mg (100 mg Oral $Given 23)        Independent Interpretation (X-rays, CTs, rhythm strip):  I reviewed patient's x-ray I do not see any pneumonia, pneumothorax or effusion    Consultations/Discussion of Management or Tests:  N/A       Social Determinants of Health affecting care:  Patient does have memory impairment.  She does receive cares from her granddaughter.  This does complicate her medical cares.      Assessments:  1950 I spoke with patient and examined her     Disposition:  The patient was discharged to home.     Impression & Plan      Medical Decision Makin-year-old female presents with shortness of breath and cough.  Vitals are reassuring.  Broad differentials pursued include not limited to bronchitis, asthma, pneumonia, pneumothorax, effusion, ACS, arrhythmia, PE,  COVID-19, influenza, RSV, anemia, etc.  CBC without acute leukocytosis or anemia.  BMP without acute electrolyte, Metabolic or renal dysfunction.  COVID, influenza, RSV negative.  Chest x-ray clear without evidence acute pneumonia, pneumothorax or effusion.  Patient has no hypoxia, no pleuritic chest pain, no tachycardia I do not think symptoms are consistent with a PE.  EKG shows sinus rhythm, no acute ischemic changes.  No signs of arrhythmia.  She has no chest pain.  I do not suspect ACS.  She endorses more of a cough which is more consistent with a viral illness and/or bronchitis.  On examination she does have wheezing which resolved with nebulizer treatments.  Patient was given treatment for bronchitis she is a prior smoker and likely has some underlying COPD.  Patient was road tested and felt okay.  She has no other symptoms.  She feels improved and wishes to go home.  Plan to manage as bronchitis with close PCP follow-up.  Return precautions were given and patient was discharged home.    Diagnosis:    ICD-10-CM    1. Bronchitis  J40            Discharge Medications:  Discharge Medication List as of 2/23/2023  9:34 PM      START taking these medications    Details   albuterol (PROAIR HFA/PROVENTIL HFA/VENTOLIN HFA) 108 (90 Base) MCG/ACT inhaler Inhale 2 puffs into the lungs every 6 hours as needed for shortness of breath, wheezing or cough, Disp-18 g, R-0, E-PrescribePharmacy may dispense brand covered by insurance (Proair, or proventil or ventolin or generic albuterol inhaler)      doxycycline hyclate (VIBRAMYCIN) 100 MG capsule Take 1 capsule (100 mg) by mouth 2 times daily for 6 days, Disp-12 capsule, R-0, E-Prescribe      predniSONE (DELTASONE) 20 MG tablet Take two tablets (= 40mg) each day for 5 (five) days, Disp-10 tablet, R-0, E-Prescribe             2/23/2023   Nighat Moya MD Bennett, Jennifer L, MD  02/23/23 2945

## 2023-03-26 ENCOUNTER — HEALTH MAINTENANCE LETTER (OUTPATIENT)
Age: 83
End: 2023-03-26

## 2023-04-19 NOTE — PROGRESS NOTES
Surgical Office Location:  [facility]  [address] [place] [id number]

## 2023-04-20 ENCOUNTER — OFFICE VISIT (OUTPATIENT)
Dept: DERMATOLOGY | Facility: CLINIC | Age: 83
End: 2023-04-20
Payer: COMMERCIAL

## 2023-04-20 DIAGNOSIS — C44.622 SQUAMOUS CELL CANCER OF SKIN OF RIGHT HAND: ICD-10-CM

## 2023-04-20 DIAGNOSIS — L82.1 SEBORRHEIC KERATOSIS: Primary | ICD-10-CM

## 2023-04-20 DIAGNOSIS — D18.01 ANGIOMA OF SKIN: ICD-10-CM

## 2023-04-20 DIAGNOSIS — D23.9 DERMAL NEVUS: ICD-10-CM

## 2023-04-20 DIAGNOSIS — L81.4 LENTIGO: ICD-10-CM

## 2023-04-20 PROCEDURE — 17311 MOHS 1 STAGE H/N/HF/G: CPT | Performed by: DERMATOLOGY

## 2023-04-20 PROCEDURE — 12041 INTMD RPR N-HF/GENIT 2.5CM/<: CPT | Performed by: DERMATOLOGY

## 2023-04-20 PROCEDURE — 99213 OFFICE O/P EST LOW 20 MIN: CPT | Mod: 25 | Performed by: DERMATOLOGY

## 2023-04-20 ASSESSMENT — PAIN SCALES - GENERAL: PAINLEVEL: NO PAIN (0)

## 2023-04-20 NOTE — PROGRESS NOTES
Keisha Kolb , a 83 year old year old female patient, I was asked to see by Dr. 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  Today she notes spots on legs.  Associated symptoms: rough.  Patient has no other skin complaints today.  Remainder of the HPI, Meds, PMH, Allergies, FH, and SH was reviewed in chart.      Past Medical History:   Diagnosis Date     Anxiety      Squamous cell carcinoma of skin, unspecified        Past Surgical History:   Procedure Laterality Date     masectomy          Family History   Problem Relation Age of Onset     Squamous cell carcinoma Daughter      Squamous cell carcinoma Daughter        Social History     Socioeconomic History     Marital status:      Spouse name: Not on file     Number of children: Not on file     Years of education: Not on file     Highest education level: Not on file   Occupational History     Not on file   Tobacco Use     Smoking status: Former     Types: Cigarettes     Smokeless tobacco: Never   Vaping Use     Vaping status: Not on file   Substance and Sexual Activity     Alcohol use: No     Drug use: No     Sexual activity: Yes   Other Topics Concern     Not on file   Social History Narrative     Not on file     Social Determinants of Health     Financial Resource Strain: Not on file   Food Insecurity: Not on file   Transportation Needs: Not on file   Physical Activity: Not on file   Stress: Not on file   Social Connections: Not on file   Intimate Partner Violence: Not on file   Housing Stability: Not on file       Outpatient Encounter Medications as of 4/20/2023   Medication Sig Dispense Refill     acetaminophen (TYLENOL) 325 MG tablet Take 2 tablets (650 mg) by mouth every 6 hours as needed for mild pain or other (and adjunct with moderate or severe pain or per patient request)       albuterol (PROAIR HFA/PROVENTIL HFA/VENTOLIN HFA) 108 (90 Base) MCG/ACT inhaler Inhale 2 puffs into the lungs every 6 hours as needed for shortness of breath,  wheezing or cough 18 g 0     cetirizine (ZYRTEC) 10 MG tablet Take 10 mg by mouth 2 times daily       CITALOPRAM HYDROBROMIDE PO Take 40 mg by mouth At Bedtime       EPINEPHrine (ANY BX GENERIC EQUIV) 0.3 MG/0.3ML injection 2-pack Inject 0.3 mLs (0.3 mg) into the muscle once as needed for anaphylaxis May repeat one time in 5-15 minutes if response to initial dose is inadequate. 2 each 0     famotidine (PEPCID) 20 MG tablet Take 20 mg by mouth as needed 1 tablet daily prn with zyrtec       LORAZEPAM PO Take 1 mg by mouth every morning       predniSONE (DELTASONE) 20 MG tablet Take two tablets (= 40mg) each day for 5 (five) days 10 tablet 0     SIMVASTATIN PO Take 20 mg by mouth At Bedtime       triamcinolone (KENALOG) 0.1 % external cream Apply topically 2 times daily as needed for irritation (itchiness - don't place over open wounds) (Patient not taking: Reported on 4/20/2023) 28.4 g 0     vitamin D3 (CHOLECALCIFEROL) 50 mcg (2000 units) tablet Take 1 tablet by mouth daily       No facility-administered encounter medications on file as of 4/20/2023.             Review Of Systems  Skin: As above  Eyes: negative  Ears/Nose/Throat: negative  Respiratory: No shortness of breath, dyspnea on exertion, cough, or hemoptysis  Cardiovascular: negative  Gastrointestinal: negative  Genitourinary: negative  Musculoskeletal: negative  Neurologic: negative  Psychiatric: negative  Hematologic/Lymphatic/Immunologic: negative  Endocrine: negative      O:   NAD, WDWN, Alert & Oriented, Mood & Affect wnl, Vitals stable   Here today with family    General appearance maria isabel ii   Vitals stable   Alert, oriented and in no acute distress  No antecubital lad      R hand 8mm scaly papule    Stuck on papules and brown macules on trunk and ext    Red papules on trunk   Flesh colored papules on trunk          Eyes: Conjunctivae/lids:Normal     ENT: Lips, buccal mucosa, tongue: normal    MSK:Normal    Cardiovascular: peripheral edema slight      Pulm: Breathing Normal    Neuro/Psych: Orientation:Normal; Mood/Affect:Normal      A/P:  1. Seborrheic keratosis, lentigo, angioma, dermal nevus  2. R hand squamous cell carcinoma   It was a pleasure speaking to Keisha JONI Kolb today.  Previous clinic  notes and pertinent laboratory tests were reviewed prior to Keisha Kolb's visit.  Nature of benign skin lesions dicussed with patient.  Signs and Symptoms of skin cancer discussed with patient.  Patient encouraged to perform monthly skin exams.  UV precautions reviewed with patient.  Risks of non-melanoma skin cancer discussed with patient   Return to clinic 6 months  PROCEDURE NOTE  R hand squamous cell carcinoma   MOHS:   Location    The rationale for Mohs surgery was discussed with the patient and consent was obtained.  The risks and benefits as well as alternatives to therapy were discussed, in detail.  Specifically, the risks of infection, scarring, bleeding, prolonged wound healing, incomplete removal, allergy to anesthesia, nerve injury and recurrence were addressed.  Indication for Mohs was Location. Prior to the procedure, the treatment site was clearly identified and, if available, confirmed with previous photos and confirmed by the patient   All components of the Universal Protocol/PAUSE rule were completed.  The Mohs surgeon operated in two distinct and integrated capacities as the surgeon and pathologist.      The area was prepped with Betasept.  A rim of normal appearing skin was marked circumferentially around the lesion.  The area was infiltrated with local anesthesia.  The tumor was first debulked to remove all clinically apparent tumor.  An incision following the standard Mohs approach was done and the specimen was oriented,mapped and placed in 1 block(s).  Each specimen was then chromacoded and processed in the Mohs laboratory using standard Mohs technique and submitted for frozen section histology.  Frozen section analysis showed no  residual tumor but CLEAR MARGINS.      The tumor was excised using standard Mohs technique in 1 stages(s).  CLEAR MARGINS OBTAINED and Final defect size was 1.5 cm.     We discussed the options for wound management in full with the patient including risks/benefits/ possible outcomes.        Because of the relatively superficial nature of the wound and patient s preference, an intermediate repair was planned.  Guiding sutures were carefully placed across the wound to control the direction of wound closure, to prevent distortion from wound contraction, and to minimize wound size to facilitate wound care and healing.  No complications; EBL minimal.  Routine wound care discussed with patient.

## 2023-04-20 NOTE — PATIENT INSTRUCTIONS
Open Wound Care     for ______________        No strenuous activity for 48 hours    Take Tylenol as needed for discomfort.                                                .         Do not drink alcoholic beverages for 48 hours.    Keep the pressure bandage in place for 24 hours. If the bandage becomes blood tinged or loose, reinforce it with gauze and tape.        (Refer to the reverse side of this page for management of bleeding).    Remove bandage in 24 hours and begin wound care as follows:     Clean area with tap water using a Q tip or gauze pad, (shower / bathe normally)  Dry wound with Q tip or gauze pad  Apply Aquaphor, Vaseline, Polysporin or Bacitracin Ointment with a Q tip  Do NOT use Neosporin Ointment *  Cover the wound with a band-aid or nonstick gauze pad and paper tape.  Repeat wound care once a day until wound is completely healed.    It is an old wives tale that a wound heals better when it is exposed to air and allowed to dry out. The wound will heal faster with a better cosmetic result if it is kept moist with ointment and covered with a bandage.  Do not let the wound dry out.      Supplies Needed:                Qtips or gauze pads                Polysporin or Bacitracin Ointment                Bandaids or nonstick gauze pads and paper tape    Wound care kits and brown paper tape are available for purchase at   the pharmacy.    BLEEDING:    Use tightly rolled up gauze or cloth to apply direct pressure over the bandage for 20   minutes.  Reapply pressure for an additional 20 minutes if necessary  Call the office or go to the nearest emergency room if pressure fails to stop the bleeding.  Use additional gauze and tape to maintain pressure once the bleeding has stopped.  Begin wound care 24 hours after surgery as directed.                  WOUND HEALING    One week after surgery a pink / red halo will form around the outside of the wound.   This is new skin.  The center of the wound will appear  yellowish white and produce some drainage.  The pink halo will slowly migrate in toward the center of the wound until the wound is covered with new shiny pink skin.  There will be no more drainage when the wound is completely healed.  It will take six months to one year for the redness to fade.  The scar may be itchy, tight and sensitive to extreme temperatures for a year after the surgery.  Massaging the area several times a day for several minutes after the wound is completely healed will help the scar soften and normalize faster. Begin massage only after healing is complete.      In case of emergency call: Dr Parkinson: [phone]    [facility]: [phone]    [facility]:[phone]

## 2023-04-20 NOTE — LETTER
4/20/2023         RE: Keisha Kolb  [address] [facility] [address]        Dear Colleague,    Thank you for referring your patient, Keisha Kolb, to the [facility]. Please see a copy of my visit note below.    Surgical Office Location:  [facility]  [address] [place] [id number]      Keisha Kolb , a 83 year old year old female patient, I was asked to see by Dr. 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  Today she notes spots on legs.  Associated symptoms: rough.  Patient has no other skin complaints today.  Remainder of the HPI, Meds, PMH, Allergies, FH, and SH was reviewed in chart.      Past Medical History:   Diagnosis Date     Anxiety      Squamous cell carcinoma of skin, unspecified        Past Surgical History:   Procedure Laterality Date     masectomy          Family History   Problem Relation Age of Onset     Squamous cell carcinoma Daughter      Squamous cell carcinoma Daughter        Social History     Socioeconomic History     Marital status:      Spouse name: Not on file     Number of children: Not on file     Years of education: Not on file     Highest education level: Not on file   Occupational History     Not on file   Tobacco Use     Smoking status: Former     Types: Cigarettes     Smokeless tobacco: Never   Vaping Use     Vaping status: Not on file   Substance and Sexual Activity     Alcohol use: No     Drug use: No     Sexual activity: Yes   Other Topics Concern     Not on file   Social History Narrative     Not on file     Social Determinants of Health     Financial Resource Strain: Not on file   Food Insecurity: Not on file   Transportation Needs: Not on file   Physical Activity: Not on file   Stress: Not on file   Social Connections: Not on file   Intimate Partner Violence: Not on file   Housing Stability: Not on file       Outpatient Encounter Medications as of 4/20/2023   Medication  Sig Dispense Refill     acetaminophen (TYLENOL) 325 MG tablet Take 2 tablets (650 mg) by mouth every 6 hours as needed for mild pain or other (and adjunct with moderate or severe pain or per patient request)       albuterol (PROAIR HFA/PROVENTIL HFA/VENTOLIN HFA) 108 (90 Base) MCG/ACT inhaler Inhale 2 puffs into the lungs every 6 hours as needed for shortness of breath, wheezing or cough 18 g 0     cetirizine (ZYRTEC) 10 MG tablet Take 10 mg by mouth 2 times daily       CITALOPRAM HYDROBROMIDE PO Take 40 mg by mouth At Bedtime       EPINEPHrine (ANY BX GENERIC EQUIV) 0.3 MG/0.3ML injection 2-pack Inject 0.3 mLs (0.3 mg) into the muscle once as needed for anaphylaxis May repeat one time in 5-15 minutes if response to initial dose is inadequate. 2 each 0     famotidine (PEPCID) 20 MG tablet Take 20 mg by mouth as needed 1 tablet daily prn with zyrtec       LORAZEPAM PO Take 1 mg by mouth every morning       predniSONE (DELTASONE) 20 MG tablet Take two tablets (= 40mg) each day for 5 (five) days 10 tablet 0     SIMVASTATIN PO Take 20 mg by mouth At Bedtime       triamcinolone (KENALOG) 0.1 % external cream Apply topically 2 times daily as needed for irritation (itchiness - don't place over open wounds) (Patient not taking: Reported on 4/20/2023) 28.4 g 0     vitamin D3 (CHOLECALCIFEROL) 50 mcg (2000 units) tablet Take 1 tablet by mouth daily       No facility-administered encounter medications on file as of 4/20/2023.             Review Of Systems  Skin: As above  Eyes: negative  Ears/Nose/Throat: negative  Respiratory: No shortness of breath, dyspnea on exertion, cough, or hemoptysis  Cardiovascular: negative  Gastrointestinal: negative  Genitourinary: negative  Musculoskeletal: negative  Neurologic: negative  Psychiatric: negative  Hematologic/Lymphatic/Immunologic: negative  Endocrine: negative      O:   NAD, WDWN, Alert & Oriented, Mood & Affect wnl, Vitals stable   Here today with family    General appearance maria isabel  ii   Vitals stable   Alert, oriented and in no acute distress  No antecubital lad      R hand 8mm scaly papule    Stuck on papules and brown macules on trunk and ext    Red papules on trunk   Flesh colored papules on trunk          Eyes: Conjunctivae/lids:Normal     ENT: Lips, buccal mucosa, tongue: normal    MSK:Normal    Cardiovascular: peripheral edema slight     Pulm: Breathing Normal    Neuro/Psych: Orientation:Normal; Mood/Affect:Normal      A/P:  1. Seborrheic keratosis, lentigo, angioma, dermal nevus  2. R hand squamous cell carcinoma   It was a pleasure speaking to Keisha Kolb today.  Previous clinic  notes and pertinent laboratory tests were reviewed prior to Keisha Kolb's visit.  Nature of benign skin lesions dicussed with patient.  Signs and Symptoms of skin cancer discussed with patient.  Patient encouraged to perform monthly skin exams.  UV precautions reviewed with patient.  Risks of non-melanoma skin cancer discussed with patient   Return to clinic 6 months  PROCEDURE NOTE  R hand squamous cell carcinoma   MOHS:   Location    The rationale for Mohs surgery was discussed with the patient and consent was obtained.  The risks and benefits as well as alternatives to therapy were discussed, in detail.  Specifically, the risks of infection, scarring, bleeding, prolonged wound healing, incomplete removal, allergy to anesthesia, nerve injury and recurrence were addressed.  Indication for Mohs was Location. Prior to the procedure, the treatment site was clearly identified and, if available, confirmed with previous photos and confirmed by the patient   All components of the Universal Protocol/PAUSE rule were completed.  The Mohs surgeon operated in two distinct and integrated capacities as the surgeon and pathologist.      The area was prepped with Betasept.  A rim of normal appearing skin was marked circumferentially around the lesion.  The area was infiltrated with local anesthesia.  The tumor  was first debulked to remove all clinically apparent tumor.  An incision following the standard Mohs approach was done and the specimen was oriented,mapped and placed in 1 block(s).  Each specimen was then chromacoded and processed in the Mohs laboratory using standard Mohs technique and submitted for frozen section histology.  Frozen section analysis showed no residual tumor but CLEAR MARGINS.      The tumor was excised using standard Mohs technique in 1 stages(s).  CLEAR MARGINS OBTAINED and Final defect size was 1.5 cm.     We discussed the options for wound management in full with the patient including risks/benefits/ possible outcomes.        Because of the relatively superficial nature of the wound and patient s preference, an intermediate repair was planned.  Guiding sutures were carefully placed across the wound to control the direction of wound closure, to prevent distortion from wound contraction, and to minimize wound size to facilitate wound care and healing.  No complications; EBL minimal.  Routine wound care discussed with patient.          Again, thank you for allowing me to participate in the care of your patient.        Sincerely,        Cooper Parkinson MD

## 2023-06-14 ENCOUNTER — TELEPHONE (OUTPATIENT)
Dept: DERMATOLOGY | Facility: CLINIC | Age: 83
End: 2023-06-14
Payer: COMMERCIAL

## 2023-06-14 NOTE — TELEPHONE ENCOUNTER
[facility] Call Center    Phone Message    May a detailed message be left on voicemail: yes     Reason for Call: Other: Pts Granddaughter nAnika is calling to reschedule her 1 month mohs follow up. Please call [phone] to reschedule, thanks!      Action Taken: Message routed to:  Other: [facility] DERM    Travel Screening: Not Applicable

## 2023-06-15 ENCOUNTER — OFFICE VISIT (OUTPATIENT)
Dept: DERMATOLOGY | Facility: CLINIC | Age: 83
End: 2023-06-15
Payer: COMMERCIAL

## 2023-06-15 DIAGNOSIS — L82.1 SEBORRHEIC KERATOSIS: ICD-10-CM

## 2023-06-15 DIAGNOSIS — Z85.828 HISTORY OF SKIN CANCER: ICD-10-CM

## 2023-06-15 DIAGNOSIS — D23.9 DERMAL NEVUS: ICD-10-CM

## 2023-06-15 DIAGNOSIS — L81.4 LENTIGO: Primary | ICD-10-CM

## 2023-06-15 DIAGNOSIS — D18.01 ANGIOMA OF SKIN: ICD-10-CM

## 2023-06-15 DIAGNOSIS — C44.622 SQUAMOUS CELL CANCER OF SKIN OF RIGHT HAND: ICD-10-CM

## 2023-06-15 DIAGNOSIS — L57.0 AK (ACTINIC KERATOSIS): ICD-10-CM

## 2023-06-15 PROCEDURE — 17000 DESTRUCT PREMALG LESION: CPT | Mod: 59 | Performed by: DERMATOLOGY

## 2023-06-15 PROCEDURE — 88331 PATH CONSLTJ SURG 1 BLK 1SPC: CPT | Performed by: DERMATOLOGY

## 2023-06-15 PROCEDURE — 11102 TANGNTL BX SKIN SINGLE LES: CPT | Performed by: DERMATOLOGY

## 2023-06-15 PROCEDURE — 99213 OFFICE O/P EST LOW 20 MIN: CPT | Mod: 25 | Performed by: DERMATOLOGY

## 2023-06-15 PROCEDURE — 11103 TANGNTL BX SKIN EA SEP/ADDL: CPT | Performed by: DERMATOLOGY

## 2023-06-15 NOTE — PROGRESS NOTES
Keisha Kolb is an extremely pleasant 83 year old year old female patient here today for new spot on right hand and r ankle and left hand.   .   Patient states this has been present for a while.  Patient reports the following symptoms:  Tender on hand.  .  Patient reports the following previous treatments none.  These treatments did not work.  Patient reports the following modifying factors none.  Associated symptoms: none.  Patient has no other skin complaints today.  Remainder of the HPI, Meds, PMH, Allergies, FH, and SH was reviewed in chart.      Past Medical History:   Diagnosis Date     Anxiety      Squamous cell carcinoma of skin, unspecified        Past Surgical History:   Procedure Laterality Date     masectomy          Family History   Problem Relation Age of Onset     Squamous cell carcinoma Daughter      Squamous cell carcinoma Daughter        Social History     Socioeconomic History     Marital status:      Spouse name: Not on file     Number of children: Not on file     Years of education: Not on file     Highest education level: Not on file   Occupational History     Not on file   Tobacco Use     Smoking status: Former     Types: Cigarettes     Smokeless tobacco: Never   Vaping Use     Vaping status: Not on file   Substance and Sexual Activity     Alcohol use: No     Drug use: No     Sexual activity: Yes   Other Topics Concern     Not on file   Social History Narrative     Not on file     Social Determinants of Health     Financial Resource Strain: Not on file   Food Insecurity: Not on file   Transportation Needs: Not on file   Physical Activity: Not on file   Stress: Not on file   Social Connections: Not on file   Intimate Partner Violence: Not on file   Housing Stability: Not on file       Outpatient Encounter Medications as of 6/15/2023   Medication Sig Dispense Refill     acetaminophen (TYLENOL) 325 MG tablet Take 2 tablets (650 mg) by mouth every 6 hours as needed for mild pain or  other (and adjunct with moderate or severe pain or per patient request)       albuterol (PROAIR HFA/PROVENTIL HFA/VENTOLIN HFA) 108 (90 Base) MCG/ACT inhaler Inhale 2 puffs into the lungs every 6 hours as needed for shortness of breath, wheezing or cough 18 g 0     cetirizine (ZYRTEC) 10 MG tablet Take 10 mg by mouth 2 times daily       CITALOPRAM HYDROBROMIDE PO Take 40 mg by mouth At Bedtime       EPINEPHrine (ANY BX GENERIC EQUIV) 0.3 MG/0.3ML injection 2-pack Inject 0.3 mLs (0.3 mg) into the muscle once as needed for anaphylaxis May repeat one time in 5-15 minutes if response to initial dose is inadequate. 2 each 0     famotidine (PEPCID) 20 MG tablet Take 20 mg by mouth as needed 1 tablet daily prn with zyrtec       LORAZEPAM PO Take 1 mg by mouth every morning       predniSONE (DELTASONE) 20 MG tablet Take two tablets (= 40mg) each day for 5 (five) days 10 tablet 0     SIMVASTATIN PO Take 20 mg by mouth At Bedtime       vitamin D3 (CHOLECALCIFEROL) 50 mcg (2000 units) tablet Take 1 tablet by mouth daily       triamcinolone (KENALOG) 0.1 % external cream Apply topically 2 times daily as needed for irritation (itchiness - don't place over open wounds) (Patient not taking: Reported on 4/20/2023) 28.4 g 0     No facility-administered encounter medications on file as of 6/15/2023.             O:   NAD, WDWN, Alert & Oriented, Mood & Affect wnl, Vitals stable   Here today with family    General appearance normal   Vitals stable   Alert, oriented and in no acute distress   L hand gritty scaly papule   R hand keratotic papule on edge of scar 1cm  R ankle red scaly plaque 1.3cm      Stuck on papules and brown macules on trunk and ext   Red papules on trunk  Flesh colored papules on trunk         Eyes: Conjunctivae/lids:Normal     ENT: Lips, buccal mucosa, tongue: normal    MSK:Normal    Cardiovascular: peripheral edema none    Pulm: Breathing Normal    Lymph Nodes: No antecubital lad     Neuro/Psych: Orientation:Alert  and Orientedx3 ; Mood/Affect:normal       MICRO:     R hand (red):There is a proliferation of irregular nests of abnormal squamous cells arising from the epidermis and invading the dermis. These are well differentiated. The dermis shows a variable superficial perivascular inflammatory infiltrate.   R ankle :There is hyperkeratosis and focal parakeratosis of the epidermis, overlying atypical keratinocytes,  by areas of orthokeratosis, there are scattered basal atypical keratinocytes: with varying degrees of overlying loss of maturation, hyperchromatism, pleomorphism, increased and abnormal mitoses, dyskeratosis.  The dermis shows a variable inflammatory infiltrate.   A/P:  1. Seborrheic keratosis, lentigo, angioma, dermal nevus, hx of non-melanoma skin cancer   2. L hand actinic keratosis   LN2:  Treated with LN2 for 5s for 1-2 cycles. Warned risks of blistering, pain, pigment change, scarring, and incomplete resolution.  Advised patient to return if lesions do not completely resolve.  Wound care sheet given.  3. R/o squamous cell carcinoma   TANGENTIAL BIOPSY IN HOUSE:  After consent, anesthesia with LEC and prep, tangential excision performed and dx above confirmed with frozen section histology.  No complications and routine wound care.  Patient is not on  anticoagulants and risk of bleeding discussed with patient.       I have personally reviewed all specimens and/or slides and used them with my medical judgement to determine or confirm the final diagnosis.     Patient told result   R hand squamous cell carcinoma schedule excision   R ankle actinic keratosis cryo .    It was a pleasure speaking to Keisha Kolb today.  Previous clinic notes and pertinent laboratory tests were reviewed prior to Keisha Kolb's visit.  Signs and Symptoms of skin cancer discussed with patient.  Patient encouraged to perform monthly skin exams.  UV precautions reviewed with patient.  Risks of non-melanoma skin  cancer discussed with patient   Return to clinic next appt

## 2023-06-15 NOTE — Clinical Note
6/15/2023         RE: Keisha Kolb  [address] [facility] [address]        Dear Colleague,    Thank you for referring your patient, Keisha Kolb, to the [facility]. Please see a copy of my visit note below.    Keisha Kolb is an extremely pleasant 83 year old year old female patient here today for new spot on right hand and r ankle and left hand.   .   Patient states this has been present for a while.  Patient reports the following symptoms:  Tender on hand.  .  Patient reports the following previous treatments none.  These treatments did not work.  Patient reports the following modifying factors none.  Associated symptoms: none.  Patient has no other skin complaints today.  Remainder of the HPI, Meds, PMH, Allergies, FH, and SH was reviewed in chart.      Past Medical History:   Diagnosis Date     Anxiety      Squamous cell carcinoma of skin, unspecified        Past Surgical History:   Procedure Laterality Date     masectomy          Family History   Problem Relation Age of Onset     Squamous cell carcinoma Daughter      Squamous cell carcinoma Daughter        Social History     Socioeconomic History     Marital status:      Spouse name: Not on file     Number of children: Not on file     Years of education: Not on file     Highest education level: Not on file   Occupational History     Not on file   Tobacco Use     Smoking status: Former     Types: Cigarettes     Smokeless tobacco: Never   Vaping Use     Vaping status: Not on file   Substance and Sexual Activity     Alcohol use: No     Drug use: No     Sexual activity: Yes   Other Topics Concern     Not on file   Social History Narrative     Not on file     Social Determinants of Health     Financial Resource Strain: Not on file   Food Insecurity: Not on file   Transportation Needs: Not on file   Physical Activity: Not on file   Stress: Not on file   Social Connections: Not on file    Intimate Partner Violence: Not on file   Housing Stability: Not on file       Outpatient Encounter Medications as of 6/15/2023   Medication Sig Dispense Refill     acetaminophen (TYLENOL) 325 MG tablet Take 2 tablets (650 mg) by mouth every 6 hours as needed for mild pain or other (and adjunct with moderate or severe pain or per patient request)       albuterol (PROAIR HFA/PROVENTIL HFA/VENTOLIN HFA) 108 (90 Base) MCG/ACT inhaler Inhale 2 puffs into the lungs every 6 hours as needed for shortness of breath, wheezing or cough 18 g 0     cetirizine (ZYRTEC) 10 MG tablet Take 10 mg by mouth 2 times daily       CITALOPRAM HYDROBROMIDE PO Take 40 mg by mouth At Bedtime       EPINEPHrine (ANY BX GENERIC EQUIV) 0.3 MG/0.3ML injection 2-pack Inject 0.3 mLs (0.3 mg) into the muscle once as needed for anaphylaxis May repeat one time in 5-15 minutes if response to initial dose is inadequate. 2 each 0     famotidine (PEPCID) 20 MG tablet Take 20 mg by mouth as needed 1 tablet daily prn with zyrtec       LORAZEPAM PO Take 1 mg by mouth every morning       predniSONE (DELTASONE) 20 MG tablet Take two tablets (= 40mg) each day for 5 (five) days 10 tablet 0     SIMVASTATIN PO Take 20 mg by mouth At Bedtime       vitamin D3 (CHOLECALCIFEROL) 50 mcg (2000 units) tablet Take 1 tablet by mouth daily       triamcinolone (KENALOG) 0.1 % external cream Apply topically 2 times daily as needed for irritation (itchiness - don't place over open wounds) (Patient not taking: Reported on 4/20/2023) 28.4 g 0     No facility-administered encounter medications on file as of 6/15/2023.             O:   NAD, WDWN, Alert & Oriented, Mood & Affect wnl, Vitals stable   Here today with family    General appearance normal   Vitals stable   Alert, oriented and in no acute distress   L hand gritty scaly papule   R hand keratotic papule on edge of scar 1cm  R ankle red scaly plaque 1.3cm      Stuck on papules and brown macules on trunk and ext   Red  papules on trunk  Flesh colored papules on trunk         Eyes: Conjunctivae/lids:Normal     ENT: Lips, buccal mucosa, tongue: normal    MSK:Normal    Cardiovascular: peripheral edema none    Pulm: Breathing Normal    Lymph Nodes: No antecubital lad     Neuro/Psych: Orientation:Alert and Orientedx3 ; Mood/Affect:normal       MICRO:     R hand (red):There is a proliferation of irregular nests of abnormal squamous cells arising from the epidermis and invading the dermis. These are well differentiated. The dermis shows a variable superficial perivascular inflammatory infiltrate.   R ankle :There is hyperkeratosis and focal parakeratosis of the epidermis, overlying atypical keratinocytes,  by areas of orthokeratosis, there are scattered basal atypical keratinocytes: with varying degrees of overlying loss of maturation, hyperchromatism, pleomorphism, increased and abnormal mitoses, dyskeratosis.  The dermis shows a variable inflammatory infiltrate.   A/P:  1. Seborrheic keratosis, lentigo, angioma, dermal nevus, hx of non-melanoma skin cancer   2. L hand actinic keratosis   LN2:  Treated with LN2 for 5s for 1-2 cycles. Warned risks of blistering, pain, pigment change, scarring, and incomplete resolution.  Advised patient to return if lesions do not completely resolve.  Wound care sheet given.  3. R/o squamous cell carcinoma   TANGENTIAL BIOPSY IN HOUSE:  After consent, anesthesia with LEC and prep, tangential excision performed and dx above confirmed with frozen section histology.  No complications and routine wound care.  Patient is not on  anticoagulants and risk of bleeding discussed with patient.       I have personally reviewed all specimens and/or slides and used them with my medical judgement to determine or confirm the final diagnosis.     Patient told result   R hand squamous cell carcinoma schedule excision   R ankle actinic keratosis cryo .    It was a pleasure speaking to Keisha Kolb  today.  Previous clinic notes and pertinent laboratory tests were reviewed prior to Keisha Kolb's visit.  Signs and Symptoms of skin cancer discussed with patient.  Patient encouraged to perform monthly skin exams.  UV precautions reviewed with patient.  Risks of non-melanoma skin cancer discussed with patient   Return to clinic next appt        Again, thank you for allowing me to participate in the care of your patient.        Sincerely,        Cooper Parkinson MD

## 2023-06-23 ENCOUNTER — HOSPITAL ENCOUNTER (EMERGENCY)
Facility: CLINIC | Age: 83
Discharge: HOME | End: 2023-06-23
Attending: EMERGENCY MEDICINE | Admitting: EMERGENCY MEDICINE
Payer: COMMERCIAL

## 2023-06-23 VITALS
RESPIRATION RATE: 18 BRPM | TEMPERATURE: 98.3 F | SYSTOLIC BLOOD PRESSURE: 141 MMHG | OXYGEN SATURATION: 93 % | HEART RATE: 87 BPM | DIASTOLIC BLOOD PRESSURE: 100 MMHG

## 2023-06-23 DIAGNOSIS — R05.9 COUGH, UNSPECIFIED TYPE: ICD-10-CM

## 2023-06-23 DIAGNOSIS — J40 BRONCHITIS: ICD-10-CM

## 2023-06-23 PROCEDURE — 94640 AIRWAY INHALATION TREATMENT: CPT

## 2023-06-23 PROCEDURE — 99284 EMERGENCY DEPT VISIT MOD MDM: CPT | Mod: 25

## 2023-06-23 PROCEDURE — 250N000009 HC RX 250: Performed by: EMERGENCY MEDICINE

## 2023-06-23 RX ORDER — PREDNISONE 20 MG/1
TABLET ORAL
Qty: 10 TABLET | Refills: 0 | Status: SHIPPED | OUTPATIENT
Start: 2023-06-23 | End: 2023-11-20

## 2023-06-23 RX ORDER — IPRATROPIUM BROMIDE AND ALBUTEROL SULFATE 2.5; .5 MG/3ML; MG/3ML
6 SOLUTION RESPIRATORY (INHALATION) ONCE
Status: COMPLETED | OUTPATIENT
Start: 2023-06-23 | End: 2023-06-23

## 2023-06-23 RX ORDER — AZITHROMYCIN 250 MG/1
TABLET, FILM COATED ORAL
Qty: 6 TABLET | Refills: 0 | Status: SHIPPED | OUTPATIENT
Start: 2023-06-23 | End: 2023-06-28

## 2023-06-23 RX ADMIN — IPRATROPIUM BROMIDE AND ALBUTEROL SULFATE 6 ML: .5; 3 SOLUTION RESPIRATORY (INHALATION) at 15:57

## 2023-06-23 ASSESSMENT — ACTIVITIES OF DAILY LIVING (ADL): ADLS_ACUITY_SCORE: 35

## 2023-06-23 NOTE — ED TRIAGE NOTES
Pt arrives via EMS [facility] with c/o upper resp symtoms x3 days. Neg COVID. Pt found 87% on RA. Duo neb and 2 L NC, sats improved to 96%. . Pt with wet cough and diminished lung sounds. EKG WNL. ABC intact. A&Ox4.

## 2023-06-23 NOTE — ED PROVIDER NOTES
"  History     Chief Complaint:  URI Symptoms     The history is provided by a relative and the patient.      Keisha Kolb is a 83 year old female with a history of of hyperlipidemia, asthma, acute respiratory failure with hypoxia, and aspiration pneumonia who presents via EMS with URI symptoms. The patient's son reports that she has had a history of intermittent Bronchitis that seemed to flare up about three days ago, noting that she has had a cough, congestion, and shortness of breath this morning. He adds that she was also seen at urgent care today, where she had a chest X-ray and was also noted to have low oxygen saturation. He notes that she also had nebulizer treatments done today.The patient reports that she has been feeling short of breath, noting that she can hear a \"rattling\" noise in her lungs. She denies anything coming up with her cough. She also denies any chest pain, fever, or abdominal pain. She is not on oxygen at home.     Independent Historian:   Son - They report above    Review of External Notes:   Urgent care note from today reviewed with the patient was sent to the ED for further evaluation due to concern for hypoxemia.  Her oxygen saturation was noted to be 88 to 92%.  She received a DuoNeb and was sent to the ED via EMS.    Medications:    Lipitor  Lorazepam  Celexa  Aspirin 81 mg  Citalopram  Simvastatin    Past Medical History:    Anxiety  Asthma  Aspiration pneumonia  Acute respiratory failure with hypoxia  Squamous cell carcinoma of skin  Spinal stenosis  Cellulitis of right lower extremity  Ductal carcinoma in situ of left breast  Hyperlipidemia  Cataract  Osteopenia  Ocular hypertension    Past Surgical History:    Mastectomy, left  Tubal ligation  Nose surgery  Cataract removal, bilateral  Mohs surgery    Physical Exam     Patient Vitals for the past 24 hrs:   BP Temp Pulse Resp SpO2   06/23/23 1815 -- -- -- -- 93 %   06/23/23 1645 -- -- -- -- 95 %   06/23/23 1639 -- -- -- -- 96 " %   23 1634 -- -- -- -- 96 %   23 1621 -- -- -- -- 96 %   23 1558 -- 98.3  F (36.8  C) 87 18 --   23 1556 -- -- -- -- 95 %   23 1519 -- -- -- -- 93 %   23 1515 (!) 141/100 -- -- -- (!) 88 %        Physical Exam  General: Laying on the ED bed, no distress  HEENT: Normocephalic, atraumatic  Cardiac: Warm and well perfused, regular rate and rhythm  Pulm: Breathing comfortably, no accessory muscle usage, no conversational dyspnea, coarse sounding cough and bilateral expiratory rhonchi  GI: Abdomen soft, nontender, no rigidity or guarding  MSK: No deformities  Skin: Warm and dry  Neuro: Moves all extremities  Psych: Normal mood and affect      Emergency Department Course     Emergency Department Course & Assessments:     Interventions:  Medications   ipratropium - albuterol 0.5 mg/2.5 mg/3 mL (DUONEB) neb solution 6 mL (6 mLs Nebulization $Given 23 155)      Assessments:  ED Course as of 23 I obtained history and examined the patient as noted above.     I rechecked and reevaluated the patient. I also discussed test results and findings.       Independent Interpretation (X-rays, CTs, rhythm strip):  None    Consultations/Discussion of Management or Tests:  None     Social Determinants of Health affecting care:   None    Disposition:  The patient was discharged to home.     Impression & Plan      Medical Decision Makin-year-old female presents with respiratory symptoms as above.  Overall picture is concerning for likely COPD exacerbation versus bronchitis.  She is afebrile here and chest x-ray at urgent care showed no lobar infiltrates.  She was treated here with 2 more DuoNebs and her oxygen saturation was in the mid 90s on room air.  Plan is for discharge home on a steroid burst and course of azithromycin.    Critical Care time:  was 0 minutes for this patient excluding procedures.    Diagnosis:    ICD-10-CM    1. Cough, unspecified  type  R05.9       2. Bronchitis  J40            Discharge Medications:  New Prescriptions    AZITHROMYCIN (ZITHROMAX) 250 MG TABLET    Take 2 tablets (500 mg) by mouth daily for 1 day, THEN 1 tablet (250 mg) daily for 4 days.    PREDNISONE (DELTASONE) 20 MG TABLET    Take two tablets (= 40mg) each day for 5 (five) days        Scribe Disclosure:  I, TABBY RAMOS, am serving as a scribe at 3:21 PM on 6/23/2023 to document services personally performed by Dino Amador MD based on my observations and the provider's statements to me.      6/23/2023   Dino Amador MD King, Colin, MD  06/23/23 1952

## 2023-06-23 NOTE — ED NOTES
Patient eligible for discharge. Patient education including discharge instruction, medication review, pain management, and follow-up care discussed. Patient/representative verbalized understanding of education. Patient/representative able to preform teach back education. All questions answered and concerns addressed. IV removed. All belongings sent home with patient/representative. Patient stable/improved at time of discharge.

## 2023-08-31 ENCOUNTER — TELEPHONE (OUTPATIENT)
Dept: DERMATOLOGY | Facility: CLINIC | Age: 83
End: 2023-08-31

## 2023-08-31 NOTE — TELEPHONE ENCOUNTER
[facility] Call Center    Phone Message    May a detailed message be left on voicemail: yes     Reason for Call: Other: Pt's granddaugther called and said that the pt is not feeling well and will need to r/s her MOHS appt for today. Please call Annika back to r/s. Thanks      Action Taken: Message routed to:  Other: OX DERM    Travel Screening: Not Applicable

## 2023-08-31 NOTE — TELEPHONE ENCOUNTER
Called and spoke with pt daughter and rescheduled Mohs.    Thank you,  Devi FARRELL RN  Dermatology   [phone]

## 2023-10-11 NOTE — PROGRESS NOTES
Surgical Office Location:  [facility]  [address] [place] [id number]

## 2023-10-12 ENCOUNTER — OFFICE VISIT (OUTPATIENT)
Dept: DERMATOLOGY | Facility: CLINIC | Age: 83
End: 2023-10-12
Payer: COMMERCIAL

## 2023-10-12 DIAGNOSIS — C44.622 SQUAMOUS CELL CANCER OF SKIN OF RIGHT HAND: Primary | ICD-10-CM

## 2023-10-12 DIAGNOSIS — L82.1 SEBORRHEIC KERATOSIS: ICD-10-CM

## 2023-10-12 DIAGNOSIS — Z85.828 HISTORY OF SKIN CANCER: ICD-10-CM

## 2023-10-12 DIAGNOSIS — D23.9 DERMAL NEVUS: ICD-10-CM

## 2023-10-12 DIAGNOSIS — D18.01 ANGIOMA OF SKIN: ICD-10-CM

## 2023-10-12 DIAGNOSIS — L81.4 LENTIGO: ICD-10-CM

## 2023-10-12 PROCEDURE — 99213 OFFICE O/P EST LOW 20 MIN: CPT | Mod: 25 | Performed by: DERMATOLOGY

## 2023-10-12 PROCEDURE — 17311 MOHS 1 STAGE H/N/HF/G: CPT | Performed by: DERMATOLOGY

## 2023-10-12 RX ORDER — CITALOPRAM HYDROBROMIDE 40 MG/1
TABLET ORAL
COMMUNITY
Start: 2023-09-14 | End: 2023-11-20

## 2023-10-12 RX ORDER — FLUTICASONE PROPIONATE 100 UG/1
1 POWDER, METERED RESPIRATORY (INHALATION) 2 TIMES DAILY PRN
COMMUNITY

## 2023-10-12 RX ORDER — FEXOFENADINE HCL 60 MG/1
60 TABLET, FILM COATED ORAL 2 TIMES DAILY PRN
COMMUNITY

## 2023-10-12 NOTE — PATIENT INSTRUCTIONS
Open Wound Care     for ______________        No strenuous activity for 48 hours    Take Tylenol as needed for discomfort.                                                .         Do not drink alcoholic beverages for 48 hours.    Keep the pressure bandage in place for 24 hours. If the bandage becomes blood tinged or loose, reinforce it with gauze and tape.        (Refer to the reverse side of this page for management of bleeding).    Remove bandage in 24 hours and begin wound care as follows:     Clean area with tap water using a Q tip or gauze pad, (shower / bathe normally)  Dry wound with Q tip or gauze pad  Apply Aquaphor, Vaseline, Polysporin or Bacitracin Ointment with a Q tip  Do NOT use Neosporin Ointment *  Cover the wound with a band-aid or nonstick gauze pad and paper tape.  Repeat wound care once a day until wound is completely healed.    It is an old wives tale that a wound heals better when it is exposed to air and allowed to dry out. The wound will heal faster with a better cosmetic result if it is kept moist with ointment and covered with a bandage.  Do not let the wound dry out.      Supplies Needed:                Qtips or gauze pads                Polysporin or Bacitracin Ointment                Bandaids or nonstick gauze pads and paper tape    Wound care kits and brown paper tape are available for purchase at   the pharmacy.    BLEEDING:    Use tightly rolled up gauze or cloth to apply direct pressure over the bandage for 20   minutes.  Reapply pressure for an additional 20 minutes if necessary  Call the office or go to the nearest emergency room if pressure fails to stop the bleeding.  Use additional gauze and tape to maintain pressure once the bleeding has stopped.  Begin wound care 24 hours after surgery as directed.                  WOUND HEALING    One week after surgery a pink / red halo will form around the outside of the wound.   This is new skin.  The center of the wound will appear  yellowish white and produce some drainage.  The pink halo will slowly migrate in toward the center of the wound until the wound is covered with new shiny pink skin.  There will be no more drainage when the wound is completely healed.  It will take six months to one year for the redness to fade.  The scar may be itchy, tight and sensitive to extreme temperatures for a year after the surgery.  Massaging the area several times a day for several minutes after the wound is completely healed will help the scar soften and normalize faster. Begin massage only after healing is complete.      In case of emergency call: Dr Parkinson: [phone]    [facility]: [phone]    [facility]:[phone]

## 2023-10-12 NOTE — LETTER
10/12/2023         RE: Keisha Kolb  [address] [facility] [address]        Dear Colleague,    Thank you for referring your patient, Keisha Kolb, to the [facility]. Please see a copy of my visit note below.    Surgical Office Location:  [facility]  [address] [place] [id number]      Keisha Kolb , a 83 year old year old female patient, I was asked to see by Dr. 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  .  Patient has no other skin complaints today.  Remainder of the HPI, Meds, PMH, Allergies, FH, and SH was reviewed in chart.      Past Medical History:   Diagnosis Date     Anxiety      Squamous cell carcinoma of skin, unspecified        Past Surgical History:   Procedure Laterality Date     masectomy          Family History   Problem Relation Age of Onset     Squamous cell carcinoma Daughter      Squamous cell carcinoma Daughter        Social History     Socioeconomic History     Marital status:      Spouse name: Not on file     Number of children: Not on file     Years of education: Not on file     Highest education level: Not on file   Occupational History     Not on file   Tobacco Use     Smoking status: Former     Types: Cigarettes     Smokeless tobacco: Never   Substance and Sexual Activity     Alcohol use: No     Drug use: No     Sexual activity: Yes   Other Topics Concern     Not on file   Social History Narrative     Not on file     Social Determinants of Health     Financial Resource Strain: Not on file   Food Insecurity: Not on file   Transportation Needs: Not on file   Physical Activity: Not on file   Stress: Not on file   Social Connections: Not on file   Interpersonal Safety: Not on file   Housing Stability: Not on file       Outpatient Encounter Medications as of 10/12/2023   Medication Sig Dispense Refill     albuterol (PROAIR HFA/PROVENTIL HFA/VENTOLIN HFA) 108 (90 Base) MCG/ACT inhaler  Inhale 2 puffs into the lungs every 6 hours as needed for shortness of breath, wheezing or cough 18 g 0     citalopram (CELEXA) 40 MG tablet        CITALOPRAM HYDROBROMIDE PO Take 40 mg by mouth At Bedtime       EPINEPHrine (ANY BX GENERIC EQUIV) 0.3 MG/0.3ML injection 2-pack Inject 0.3 mLs (0.3 mg) into the muscle once as needed for anaphylaxis May repeat one time in 5-15 minutes if response to initial dose is inadequate. 2 each 0     fexofenadine (ALLEGRA) 60 MG tablet Take 60 mg by mouth 2 times daily       LORAZEPAM PO Take 1 mg by mouth every morning       SIMVASTATIN PO Take 20 mg by mouth At Bedtime       vitamin D3 (CHOLECALCIFEROL) 50 mcg (2000 units) tablet Take 1 tablet by mouth daily       acetaminophen (TYLENOL) 325 MG tablet Take 2 tablets (650 mg) by mouth every 6 hours as needed for mild pain or other (and adjunct with moderate or severe pain or per patient request) (Patient not taking: Reported on 10/12/2023)       cetirizine (ZYRTEC) 10 MG tablet Take 10 mg by mouth 2 times daily (Patient not taking: Reported on 10/12/2023)       famotidine (PEPCID) 20 MG tablet Take 20 mg by mouth as needed 1 tablet daily prn with zyrtec (Patient not taking: Reported on 10/12/2023)       FLOVENT DISKUS 100 MCG/ACT inhaler INHALE 1 PUFF BY MOUTH TWICE DAILY. RINSE MOUTH /. GARGLE AFTER USE       predniSONE (DELTASONE) 20 MG tablet Take two tablets (= 40mg) each day for 5 (five) days (Patient not taking: Reported on 10/12/2023) 10 tablet 0     triamcinolone (KENALOG) 0.1 % external cream Apply topically 2 times daily as needed for irritation (itchiness - don't place over open wounds) (Patient not taking: Reported on 4/20/2023) 28.4 g 0     No facility-administered encounter medications on file as of 10/12/2023.             Review Of Systems  Skin: As above  Eyes: negative  Ears/Nose/Throat: negative  Respiratory: No shortness of breath, dyspnea on exertion, cough, or hemoptysis  Cardiovascular:  negative  Gastrointestinal: negative  Genitourinary: negative  Musculoskeletal: negative  Neurologic: negative  Psychiatric: negative  Hematologic/Lymphatic/Immunologic: negative  Endocrine: negative      O:   NAD, WDWN, Alert & Oriented, Mood & Affect wnl, Vitals stable   General appearance maria isabel ii   Vitals stable   Alert, oriented and in no acute distress      Following lymph nodes palpated: antecubital no lad  R hand 8mm scaly papule      Stuck on papules and brown macules on trunk and ext    Red papules on trunk   Flesh colored papules on trunk          Eyes: Conjunctivae/lids:Normal     ENT: Lips, buccal mucosa, tongue: normal    MSK:Normal    Cardiovascular: peripheral edema slight     Pulm: Breathing Normal    Neuro/Psych: Orientation:Normal; Mood/Affect:Normal      A/P:  1. Seborrheic keratosis, lentigo, angioma, dermal nevus, hx of non-melanoma skin cancer   2. R hand squamous cell carcinoma   It was a pleasure speaking to Keisha Kolb today.  Previous clinic  notes and pertinent laboratory tests were reviewed prior to Keisha Kolb's visit.  Signs and Symptoms of skin cancer discussed with patient.  Patient encouraged to perform monthly skin exams.  UV precautions reviewed with patient.  Risks of non-melanoma skin cancer discussed with patient   Return to clinic 1 month  PROCEDURE NOTE  R hand squamous cell carcinoma   MOHS:   Location    The rationale for Mohs surgery was discussed with the patient and consent was obtained.  The risks and benefits as well as alternatives to therapy were discussed, in detail.  Specifically, the risks of infection, scarring, bleeding, prolonged wound healing, incomplete removal, allergy to anesthesia, nerve injury and recurrence were addressed.  Indication for Mohs was Location. Prior to the procedure, the treatment site was clearly identified and, if available, confirmed with previous photos and confirmed by the patient   All components of the Universal  Protocol/PAUSE rule were completed.  The Mohs surgeon operated in two distinct and integrated capacities as the surgeon and pathologist.      The area was prepped with Betasept.  A rim of normal appearing skin was marked circumferentially around the lesion.  The area was infiltrated with local anesthesia.  The tumor was first debulked to remove all clinically apparent tumor.  An incision following the standard Mohs approach was done and the specimen was oriented,mapped and placed in 1 block(s).  Each specimen was then chromacoded and processed in the Mohs laboratory using standard Mohs technique and submitted for frozen section histology.  Frozen section analysis showed no residual tumor but CLEAR MARGINS.      The tumor was excised using standard Mohs technique in 1 stages(s).  CLEAR MARGINS OBTAINED and Final defect size was 1.3 cm.     We discussed the options for wound management in full with the patient including risks/benefits/ possible outcomes.      REPAIR SECOND INTENT: We discussed the options for wound management in full with the patient including risks/benefits/possible outcomes. Decision made to allow the wound to heal by second intention. Cautery was used for for hemostasis. EBL minimal; complications none; wound care routine.  The patient was discharged in good condition and will return in one month or prn for wound evaluation.      Again, thank you for allowing me to participate in the care of your patient.        Sincerely,        Cooper Parkinson MD

## 2023-10-12 NOTE — PROGRESS NOTES
Keisha Kolb , a 83 year old year old female patient, I was asked to see by Dr. 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  .  Patient has no other skin complaints today.  Remainder of the HPI, Meds, PMH, Allergies, FH, and SH was reviewed in chart.      Past Medical History:   Diagnosis Date    Anxiety     Squamous cell carcinoma of skin, unspecified        Past Surgical History:   Procedure Laterality Date    masectomy          Family History   Problem Relation Age of Onset    Squamous cell carcinoma Daughter     Squamous cell carcinoma Daughter        Social History     Socioeconomic History    Marital status:      Spouse name: Not on file    Number of children: Not on file    Years of education: Not on file    Highest education level: Not on file   Occupational History    Not on file   Tobacco Use    Smoking status: Former     Types: Cigarettes    Smokeless tobacco: Never   Substance and Sexual Activity    Alcohol use: No    Drug use: No    Sexual activity: Yes   Other Topics Concern    Not on file   Social History Narrative    Not on file     Social Determinants of Health     Financial Resource Strain: Not on file   Food Insecurity: Not on file   Transportation Needs: Not on file   Physical Activity: Not on file   Stress: Not on file   Social Connections: Not on file   Interpersonal Safety: Not on file   Housing Stability: Not on file       Outpatient Encounter Medications as of 10/12/2023   Medication Sig Dispense Refill    albuterol (PROAIR HFA/PROVENTIL HFA/VENTOLIN HFA) 108 (90 Base) MCG/ACT inhaler Inhale 2 puffs into the lungs every 6 hours as needed for shortness of breath, wheezing or cough 18 g 0    citalopram (CELEXA) 40 MG tablet       CITALOPRAM HYDROBROMIDE PO Take 40 mg by mouth At Bedtime      EPINEPHrine (ANY BX GENERIC EQUIV) 0.3 MG/0.3ML injection 2-pack Inject 0.3 mLs (0.3 mg) into the muscle once as needed for anaphylaxis May repeat one time in 5-15 minutes if response to  initial dose is inadequate. 2 each 0    fexofenadine (ALLEGRA) 60 MG tablet Take 60 mg by mouth 2 times daily      LORAZEPAM PO Take 1 mg by mouth every morning      SIMVASTATIN PO Take 20 mg by mouth At Bedtime      vitamin D3 (CHOLECALCIFEROL) 50 mcg (2000 units) tablet Take 1 tablet by mouth daily      acetaminophen (TYLENOL) 325 MG tablet Take 2 tablets (650 mg) by mouth every 6 hours as needed for mild pain or other (and adjunct with moderate or severe pain or per patient request) (Patient not taking: Reported on 10/12/2023)      cetirizine (ZYRTEC) 10 MG tablet Take 10 mg by mouth 2 times daily (Patient not taking: Reported on 10/12/2023)      famotidine (PEPCID) 20 MG tablet Take 20 mg by mouth as needed 1 tablet daily prn with zyrtec (Patient not taking: Reported on 10/12/2023)      FLOVENT DISKUS 100 MCG/ACT inhaler INHALE 1 PUFF BY MOUTH TWICE DAILY. RINSE MOUTH /. GARGLE AFTER USE      predniSONE (DELTASONE) 20 MG tablet Take two tablets (= 40mg) each day for 5 (five) days (Patient not taking: Reported on 10/12/2023) 10 tablet 0    triamcinolone (KENALOG) 0.1 % external cream Apply topically 2 times daily as needed for irritation (itchiness - don't place over open wounds) (Patient not taking: Reported on 4/20/2023) 28.4 g 0     No facility-administered encounter medications on file as of 10/12/2023.             Review Of Systems  Skin: As above  Eyes: negative  Ears/Nose/Throat: negative  Respiratory: No shortness of breath, dyspnea on exertion, cough, or hemoptysis  Cardiovascular: negative  Gastrointestinal: negative  Genitourinary: negative  Musculoskeletal: negative  Neurologic: negative  Psychiatric: negative  Hematologic/Lymphatic/Immunologic: negative  Endocrine: negative      O:   NAD, WDWN, Alert & Oriented, Mood & Affect wnl, Vitals stable   General appearance maria isabel ii   Vitals stable   Alert, oriented and in no acute distress      Following lymph nodes palpated: antecubital no lad  R hand 8mm  scaly papule      Stuck on papules and brown macules on trunk and ext    Red papules on trunk   Flesh colored papules on trunk          Eyes: Conjunctivae/lids:Normal     ENT: Lips, buccal mucosa, tongue: normal    MSK:Normal    Cardiovascular: peripheral edema slight     Pulm: Breathing Normal    Neuro/Psych: Orientation:Normal; Mood/Affect:Normal      A/P:  1. Seborrheic keratosis, lentigo, angioma, dermal nevus, hx of non-melanoma skin cancer   2. R hand squamous cell carcinoma   It was a pleasure speaking to Keisha Kolb today.  Previous clinic  notes and pertinent laboratory tests were reviewed prior to Keisha Kolb's visit.  Signs and Symptoms of skin cancer discussed with patient.  Patient encouraged to perform monthly skin exams.  UV precautions reviewed with patient.  Risks of non-melanoma skin cancer discussed with patient   Return to clinic 1 month  PROCEDURE NOTE  R hand squamous cell carcinoma   MOHS:   Location    The rationale for Mohs surgery was discussed with the patient and consent was obtained.  The risks and benefits as well as alternatives to therapy were discussed, in detail.  Specifically, the risks of infection, scarring, bleeding, prolonged wound healing, incomplete removal, allergy to anesthesia, nerve injury and recurrence were addressed.  Indication for Mohs was Location. Prior to the procedure, the treatment site was clearly identified and, if available, confirmed with previous photos and confirmed by the patient   All components of the Universal Protocol/PAUSE rule were completed.  The Mohs surgeon operated in two distinct and integrated capacities as the surgeon and pathologist.      The area was prepped with Betasept.  A rim of normal appearing skin was marked circumferentially around the lesion.  The area was infiltrated with local anesthesia.  The tumor was first debulked to remove all clinically apparent tumor.  An incision following the standard Mohs approach was done  and the specimen was oriented,mapped and placed in 1 block(s).  Each specimen was then chromacoded and processed in the Mohs laboratory using standard Mohs technique and submitted for frozen section histology.  Frozen section analysis showed no residual tumor but CLEAR MARGINS.      The tumor was excised using standard Mohs technique in 1 stages(s).  CLEAR MARGINS OBTAINED and Final defect size was 1.3 cm.     We discussed the options for wound management in full with the patient including risks/benefits/ possible outcomes.      REPAIR SECOND INTENT: We discussed the options for wound management in full with the patient including risks/benefits/possible outcomes. Decision made to allow the wound to heal by second intention. Cautery was used for for hemostasis. EBL minimal; complications none; wound care routine.  The patient was discharged in good condition and will return in one month or prn for wound evaluation.

## 2023-11-20 ENCOUNTER — APPOINTMENT (OUTPATIENT)
Dept: GENERAL RADIOLOGY | Facility: CLINIC | Age: 83
DRG: 202 | End: 2023-11-20
Attending: EMERGENCY MEDICINE
Payer: COMMERCIAL

## 2023-11-20 ENCOUNTER — HOSPITAL ENCOUNTER (INPATIENT)
Facility: CLINIC | Age: 83
LOS: 6 days | Discharge: HOME HEALTH | DRG: 202 | End: 2023-11-28
Attending: EMERGENCY MEDICINE | Admitting: INTERNAL MEDICINE
Payer: COMMERCIAL

## 2023-11-20 DIAGNOSIS — B33.8 RSV INFECTION: ICD-10-CM

## 2023-11-20 DIAGNOSIS — R06.02 SHORTNESS OF BREATH: ICD-10-CM

## 2023-11-20 DIAGNOSIS — R53.1 GENERALIZED WEAKNESS: ICD-10-CM

## 2023-11-20 LAB
ANION GAP SERPL CALCULATED.3IONS-SCNC: 10 MMOL/L (ref 7–15)
BASOPHILS # BLD AUTO: 0 10E3/UL (ref 0–0.2)
BASOPHILS NFR BLD AUTO: 1 %
BUN SERPL-MCNC: 9.2 MG/DL (ref 8–23)
CALCIUM SERPL-MCNC: 8.9 MG/DL (ref 8.8–10.2)
CHLORIDE SERPL-SCNC: 100 MMOL/L (ref 98–107)
CREAT SERPL-MCNC: 0.83 MG/DL (ref 0.51–0.95)
DEPRECATED HCO3 PLAS-SCNC: 27 MMOL/L (ref 22–29)
EGFRCR SERPLBLD CKD-EPI 2021: 70 ML/MIN/1.73M2
EOSINOPHIL # BLD AUTO: 0.3 10E3/UL (ref 0–0.7)
EOSINOPHIL NFR BLD AUTO: 4 %
ERYTHROCYTE [DISTWIDTH] IN BLOOD BY AUTOMATED COUNT: 12.9 % (ref 10–15)
FLUAV RNA SPEC QL NAA+PROBE: NEGATIVE
FLUBV RNA RESP QL NAA+PROBE: NEGATIVE
GLUCOSE SERPL-MCNC: 109 MG/DL (ref 70–99)
HCT VFR BLD AUTO: 43 % (ref 35–47)
HGB BLD-MCNC: 14.1 G/DL (ref 11.7–15.7)
IMM GRANULOCYTES # BLD: 0 10E3/UL
IMM GRANULOCYTES NFR BLD: 0 %
LACTATE SERPL-SCNC: 1.6 MMOL/L (ref 0.7–2)
LYMPHOCYTES # BLD AUTO: 1.3 10E3/UL (ref 0.8–5.3)
LYMPHOCYTES NFR BLD AUTO: 16 %
MCH RBC QN AUTO: 30.5 PG (ref 26.5–33)
MCHC RBC AUTO-ENTMCNC: 32.8 G/DL (ref 31.5–36.5)
MCV RBC AUTO: 93 FL (ref 78–100)
MONOCYTES # BLD AUTO: 0.6 10E3/UL (ref 0–1.3)
MONOCYTES NFR BLD AUTO: 7 %
NEUTROPHILS # BLD AUTO: 5.9 10E3/UL (ref 1.6–8.3)
NEUTROPHILS NFR BLD AUTO: 72 %
NRBC # BLD AUTO: 0 10E3/UL
NRBC BLD AUTO-RTO: 0 /100
PLATELET # BLD AUTO: 144 10E3/UL (ref 150–450)
POTASSIUM SERPL-SCNC: 3.6 MMOL/L (ref 3.4–5.3)
RBC # BLD AUTO: 4.62 10E6/UL (ref 3.8–5.2)
RSV RNA SPEC NAA+PROBE: POSITIVE
SARS-COV-2 RNA RESP QL NAA+PROBE: NEGATIVE
SODIUM SERPL-SCNC: 137 MMOL/L (ref 135–145)
WBC # BLD AUTO: 8.2 10E3/UL (ref 4–11)

## 2023-11-20 PROCEDURE — 87637 SARSCOV2&INF A&B&RSV AMP PRB: CPT | Performed by: EMERGENCY MEDICINE

## 2023-11-20 PROCEDURE — 250N000009 HC RX 250: Performed by: EMERGENCY MEDICINE

## 2023-11-20 PROCEDURE — 82310 ASSAY OF CALCIUM: CPT | Performed by: EMERGENCY MEDICINE

## 2023-11-20 PROCEDURE — 96374 THER/PROPH/DIAG INJ IV PUSH: CPT

## 2023-11-20 PROCEDURE — 99285 EMERGENCY DEPT VISIT HI MDM: CPT | Mod: 25

## 2023-11-20 PROCEDURE — 250N000011 HC RX IP 250 OP 636: Performed by: EMERGENCY MEDICINE

## 2023-11-20 PROCEDURE — 36415 COLL VENOUS BLD VENIPUNCTURE: CPT | Performed by: EMERGENCY MEDICINE

## 2023-11-20 PROCEDURE — 71046 X-RAY EXAM CHEST 2 VIEWS: CPT

## 2023-11-20 PROCEDURE — 99222 1ST HOSP IP/OBS MODERATE 55: CPT | Performed by: INTERNAL MEDICINE

## 2023-11-20 PROCEDURE — 85025 COMPLETE CBC W/AUTO DIFF WBC: CPT | Performed by: EMERGENCY MEDICINE

## 2023-11-20 PROCEDURE — 94640 AIRWAY INHALATION TREATMENT: CPT

## 2023-11-20 PROCEDURE — G0378 HOSPITAL OBSERVATION PER HR: HCPCS

## 2023-11-20 PROCEDURE — 83605 ASSAY OF LACTIC ACID: CPT | Performed by: EMERGENCY MEDICINE

## 2023-11-20 RX ORDER — ONDANSETRON 2 MG/ML
4 INJECTION INTRAMUSCULAR; INTRAVENOUS EVERY 6 HOURS PRN
Status: DISCONTINUED | OUTPATIENT
Start: 2023-11-20 | End: 2023-11-28 | Stop reason: HOSPADM

## 2023-11-20 RX ORDER — IPRATROPIUM BROMIDE AND ALBUTEROL SULFATE 2.5; .5 MG/3ML; MG/3ML
3 SOLUTION RESPIRATORY (INHALATION) ONCE
Status: COMPLETED | OUTPATIENT
Start: 2023-11-20 | End: 2023-11-20

## 2023-11-20 RX ORDER — ALBUTEROL SULFATE 5 MG/ML
2.5 SOLUTION RESPIRATORY (INHALATION)
Status: DISCONTINUED | OUTPATIENT
Start: 2023-11-20 | End: 2023-11-28 | Stop reason: HOSPADM

## 2023-11-20 RX ORDER — ONDANSETRON 4 MG/1
4 TABLET, ORALLY DISINTEGRATING ORAL EVERY 6 HOURS PRN
Status: DISCONTINUED | OUTPATIENT
Start: 2023-11-20 | End: 2023-11-28 | Stop reason: HOSPADM

## 2023-11-20 RX ORDER — IPRATROPIUM BROMIDE AND ALBUTEROL SULFATE 2.5; .5 MG/3ML; MG/3ML
SOLUTION RESPIRATORY (INHALATION)
Status: DISCONTINUED
Start: 2023-11-20 | End: 2023-11-20 | Stop reason: HOSPADM

## 2023-11-20 RX ORDER — LORAZEPAM 0.5 MG/1
0.5 TABLET ORAL EVERY MORNING
Status: DISCONTINUED | OUTPATIENT
Start: 2023-11-21 | End: 2023-11-28 | Stop reason: HOSPADM

## 2023-11-20 RX ORDER — METHYLPREDNISOLONE SODIUM SUCCINATE 125 MG/2ML
125 INJECTION, POWDER, LYOPHILIZED, FOR SOLUTION INTRAMUSCULAR; INTRAVENOUS ONCE
Status: COMPLETED | OUTPATIENT
Start: 2023-11-20 | End: 2023-11-20

## 2023-11-20 RX ORDER — AMOXICILLIN 250 MG
1 CAPSULE ORAL 2 TIMES DAILY PRN
Status: DISCONTINUED | OUTPATIENT
Start: 2023-11-20 | End: 2023-11-28 | Stop reason: HOSPADM

## 2023-11-20 RX ORDER — AMOXICILLIN 250 MG
2 CAPSULE ORAL 2 TIMES DAILY PRN
Status: DISCONTINUED | OUTPATIENT
Start: 2023-11-20 | End: 2023-11-28 | Stop reason: HOSPADM

## 2023-11-20 RX ORDER — ACETAMINOPHEN 325 MG/1
650 TABLET ORAL EVERY 4 HOURS PRN
Status: DISCONTINUED | OUTPATIENT
Start: 2023-11-20 | End: 2023-11-28 | Stop reason: HOSPADM

## 2023-11-20 RX ORDER — AMOXICILLIN 250 MG
1 CAPSULE ORAL 2 TIMES DAILY
Status: DISCONTINUED | OUTPATIENT
Start: 2023-11-20 | End: 2023-11-28 | Stop reason: HOSPADM

## 2023-11-20 RX ORDER — ACETAMINOPHEN 650 MG/1
650 SUPPOSITORY RECTAL EVERY 4 HOURS PRN
Status: DISCONTINUED | OUTPATIENT
Start: 2023-11-20 | End: 2023-11-28 | Stop reason: HOSPADM

## 2023-11-20 RX ORDER — PREDNISONE 20 MG/1
40 TABLET ORAL DAILY
Status: DISCONTINUED | OUTPATIENT
Start: 2023-11-21 | End: 2023-11-24

## 2023-11-20 RX ORDER — AMOXICILLIN 250 MG
2 CAPSULE ORAL 2 TIMES DAILY
Status: DISCONTINUED | OUTPATIENT
Start: 2023-11-20 | End: 2023-11-28 | Stop reason: HOSPADM

## 2023-11-20 RX ADMIN — METHYLPREDNISOLONE SODIUM SUCCINATE 125 MG: 125 INJECTION, POWDER, FOR SOLUTION INTRAMUSCULAR; INTRAVENOUS at 20:24

## 2023-11-20 RX ADMIN — IPRATROPIUM BROMIDE AND ALBUTEROL SULFATE 3 ML: .5; 3 SOLUTION RESPIRATORY (INHALATION) at 22:46

## 2023-11-20 RX ADMIN — IPRATROPIUM BROMIDE AND ALBUTEROL SULFATE 3 ML: .5; 3 SOLUTION RESPIRATORY (INHALATION) at 20:20

## 2023-11-20 ASSESSMENT — ACTIVITIES OF DAILY LIVING (ADL)
ADLS_ACUITY_SCORE: 35
ADLS_ACUITY_SCORE: 35

## 2023-11-21 LAB — GLUCOSE BLDC GLUCOMTR-MCNC: 233 MG/DL (ref 70–99)

## 2023-11-21 PROCEDURE — G0378 HOSPITAL OBSERVATION PER HR: HCPCS

## 2023-11-21 PROCEDURE — 250N000012 HC RX MED GY IP 250 OP 636 PS 637: Performed by: INTERNAL MEDICINE

## 2023-11-21 PROCEDURE — 250N000013 HC RX MED GY IP 250 OP 250 PS 637: Performed by: INTERNAL MEDICINE

## 2023-11-21 PROCEDURE — 250N000009 HC RX 250: Performed by: INTERNAL MEDICINE

## 2023-11-21 PROCEDURE — 999N000156 HC STATISTIC RCP CONSULT EA 30 MIN

## 2023-11-21 PROCEDURE — 99232 SBSQ HOSP IP/OBS MODERATE 35: CPT | Performed by: INTERNAL MEDICINE

## 2023-11-21 PROCEDURE — 999N000157 HC STATISTIC RCP TIME EA 10 MIN

## 2023-11-21 PROCEDURE — 82962 GLUCOSE BLOOD TEST: CPT

## 2023-11-21 PROCEDURE — 94640 AIRWAY INHALATION TREATMENT: CPT

## 2023-11-21 RX ORDER — CITALOPRAM HYDROBROMIDE 20 MG/1
40 TABLET ORAL AT BEDTIME
Status: DISCONTINUED | OUTPATIENT
Start: 2023-11-21 | End: 2023-11-28 | Stop reason: HOSPADM

## 2023-11-21 RX ORDER — IPRATROPIUM BROMIDE AND ALBUTEROL SULFATE 2.5; .5 MG/3ML; MG/3ML
3 SOLUTION RESPIRATORY (INHALATION)
Status: DISCONTINUED | OUTPATIENT
Start: 2023-11-21 | End: 2023-11-28 | Stop reason: HOSPADM

## 2023-11-21 RX ORDER — GUAIFENESIN 200 MG/10ML
200 LIQUID ORAL EVERY 4 HOURS PRN
Status: DISCONTINUED | OUTPATIENT
Start: 2023-11-21 | End: 2023-11-28 | Stop reason: HOSPADM

## 2023-11-21 RX ORDER — BENZONATATE 100 MG/1
100 CAPSULE ORAL 3 TIMES DAILY
Status: DISCONTINUED | OUTPATIENT
Start: 2023-11-21 | End: 2023-11-28 | Stop reason: HOSPADM

## 2023-11-21 RX ORDER — FEXOFENADINE HCL 60 MG/1
60 TABLET, FILM COATED ORAL 2 TIMES DAILY
Status: DISCONTINUED | OUTPATIENT
Start: 2023-11-21 | End: 2023-11-28 | Stop reason: HOSPADM

## 2023-11-21 RX ADMIN — BENZONATATE 100 MG: 100 CAPSULE ORAL at 21:32

## 2023-11-21 RX ADMIN — ALBUTEROL SULFATE 2.5 MG: 2.5 SOLUTION RESPIRATORY (INHALATION) at 01:40

## 2023-11-21 RX ADMIN — Medication 1 MG: at 01:59

## 2023-11-21 RX ADMIN — GUAIFENESIN 200 MG: 200 SOLUTION ORAL at 14:42

## 2023-11-21 RX ADMIN — IPRATROPIUM BROMIDE AND ALBUTEROL SULFATE 3 ML: .5; 3 SOLUTION RESPIRATORY (INHALATION) at 10:23

## 2023-11-21 RX ADMIN — SENNOSIDES AND DOCUSATE SODIUM 1 TABLET: 8.6; 5 TABLET ORAL at 08:21

## 2023-11-21 RX ADMIN — IPRATROPIUM BROMIDE AND ALBUTEROL SULFATE 3 ML: .5; 3 SOLUTION RESPIRATORY (INHALATION) at 19:56

## 2023-11-21 RX ADMIN — LORAZEPAM 0.5 MG: 0.5 TABLET ORAL at 08:22

## 2023-11-21 RX ADMIN — IPRATROPIUM BROMIDE AND ALBUTEROL SULFATE 3 ML: .5; 3 SOLUTION RESPIRATORY (INHALATION) at 16:26

## 2023-11-21 RX ADMIN — GUAIFENESIN 200 MG: 200 SOLUTION ORAL at 23:38

## 2023-11-21 RX ADMIN — FEXOFENADINE HYDROCHLORIDE 60 MG: 60 TABLET ORAL at 10:23

## 2023-11-21 RX ADMIN — FEXOFENADINE HYDROCHLORIDE 60 MG: 60 TABLET ORAL at 19:37

## 2023-11-21 RX ADMIN — BENZONATATE 100 MG: 100 CAPSULE ORAL at 17:07

## 2023-11-21 RX ADMIN — PREDNISONE 40 MG: 20 TABLET ORAL at 08:22

## 2023-11-21 RX ADMIN — SENNOSIDES AND DOCUSATE SODIUM 2 TABLET: 8.6; 5 TABLET ORAL at 19:37

## 2023-11-21 RX ADMIN — CITALOPRAM HYDROBROMIDE 40 MG: 20 TABLET, FILM COATED ORAL at 21:32

## 2023-11-21 ASSESSMENT — ACTIVITIES OF DAILY LIVING (ADL)
ADLS_ACUITY_SCORE: 35
ADLS_ACUITY_SCORE: 48
ADLS_ACUITY_SCORE: 47
ADLS_ACUITY_SCORE: 47
ADLS_ACUITY_SCORE: 48
ADLS_ACUITY_SCORE: 41
ADLS_ACUITY_SCORE: 47
ADLS_ACUITY_SCORE: 48
ADLS_ACUITY_SCORE: 47

## 2023-11-21 NOTE — ED NOTES
[facility]  ED Nurse Handoff Report    ED Chief complaint: Fatigue and Cough  . ED Diagnosis:   Final diagnoses:   None       Allergies: No Known Allergies    Code Status: DNR/DNI    Activity level - Baseline/Home:  independent.  Activity Level - Current:   assist 1   Lift room needed: No.   Bariatric: No   Needed: No   Isolation: Yes.   Infection: Not Applicable  Other .     Respiratory status: Room air    Vital Signs (within 30 minutes):   Vitals:    11/20/23 1933 11/20/23 2030   BP: (!) 180/96 (!) 152/75   Pulse: 89 83   Resp: 16 15   Temp: 98.1  F (36.7  C)    TempSrc: Oral    SpO2: 96% 100%       Cardiac Rhythm:  ,      Pain level:    Patient confused: No.   Patient Falls Risk: nonskid shoes/slippers when out of bed, patient and family education, activity supervised, and toileting schedule implemented.   Elimination Status: Has voided     Patient Report - Initial Complaint: fatigue, cough  Focused Assessment: Pt. arrived via EMS complaining of cough and fatigue since yesterday. Granddaughter noticed of increasing cough and fatigue today therefore seek for assessment in the hospital. Received duoneb during transport. With oxygen sat of above 90% on room air.  Pt with history of dementia. As per EMS Pt has history of covid and basal pneumonia a year ago.          Abnormal Results:   Labs Ordered and Resulted from Time of ED Arrival to Time of ED Departure   BASIC METABOLIC PANEL - Abnormal       Result Value    Sodium 137      Potassium 3.6      Chloride 100      Carbon Dioxide (CO2) 27      Anion Gap 10      Urea Nitrogen 9.2      Creatinine 0.83      GFR Estimate 70      Calcium 8.9      Glucose 109 (*)    INFLUENZA A/B, RSV, & SARS-COV2 PCR - Abnormal    Influenza A PCR Negative      Influenza B PCR Negative      RSV PCR Positive (*)     SARS CoV2 PCR Negative     CBC WITH PLATELETS AND DIFFERENTIAL - Abnormal    WBC Count 8.2      RBC Count 4.62      Hemoglobin 14.1       Hematocrit 43.0      MCV 93      MCH 30.5      MCHC 32.8      RDW 12.9      Platelet Count 144 (*)     % Neutrophils 72      % Lymphocytes 16      % Monocytes 7      % Eosinophils 4      % Basophils 1      % Immature Granulocytes 0      NRBCs per 100 WBC 0      Absolute Neutrophils 5.9      Absolute Lymphocytes 1.3      Absolute Monocytes 0.6      Absolute Eosinophils 0.3      Absolute Basophils 0.0      Absolute Immature Granulocytes 0.0      Absolute NRBCs 0.0     LACTIC ACID WHOLE BLOOD - Normal    Lactic Acid 1.6          Chest XR,  PA & LAT   Final Result   IMPRESSION: Slight atelectasis/scarring of the lower right lung. Mild elevation of the left hemidiaphragm. Lungs otherwise clear. Mild cardiomegaly. Normal pulmonary vascularity. No pleural fluid or pneumothorax.          Treatments provided: see MAR  Family Comments: N/A  OBS brochure/video discussed/provided to patient:  No  ED Medications:   Medications   ipratropium - albuterol 0.5 mg/2.5 mg/3 mL (DUONEB) 0.5-2.5 (3) MG/3ML neb solution (  Canceled Entry 11/20/23 2043)   ipratropium - albuterol 0.5 mg/2.5 mg/3 mL (DUONEB) neb solution 3 mL (has no administration in time range)   ipratropium - albuterol 0.5 mg/2.5 mg/3 mL (DUONEB) neb solution 3 mL (3 mLs Nebulization $Given 11/20/23 2020)   methylPREDNISolone sodium succinate (solu-MEDROL) injection 125 mg (125 mg Intravenous $Given 11/20/23 2024)       Drips infusing:  No  For the majority of the shift this patient was Green.   Interventions performed were N/A    Sepsis treatment initiated: No    Cares/treatment/interventions/medications to be completed following ED care: N/A    ED Nurse Name: Guillermina Britton, DANNY  10:40 PM

## 2023-11-21 NOTE — ED PROVIDER NOTES
History     Chief Complaint:  Fatigue and Cough       HPI   Keisha Kolb is a 83 year old female presenting to the ED via EMS for evaluation of a cough and fatigue.  History obtained from patient and supplemented by granddaughter present at bedside.  Patient reports for the past 3 days, she has developed symptoms of nasal congestion as well as a mild cough.  Earlier today, work of breathing became increased.  Daughter notes patient exhibiting audible wheezing.  This prompted concern and call to EMS.  On arrival, granddaughter reports SPO2 values of 90%.  DuoNeb administered in route and patient notes marked improvement in symptoms.  Granddaughter notes that ever since previous bout with COVID 1 year previous, patient has had residual pulmonary symptoms.  She also is on Flovent and albuterol as needed.  No chest pain.      Independent Historian:   Granddaughter notes patient has Flovent at home    Review of External Notes:   None       Medications:    acetaminophen (TYLENOL) 325 MG tablet  albuterol (PROAIR HFA/PROVENTIL HFA/VENTOLIN HFA) 108 (90 Base) MCG/ACT inhaler  CITALOPRAM HYDROBROMIDE PO  EPINEPHrine (ANY BX GENERIC EQUIV) 0.3 MG/0.3ML injection 2-pack  fexofenadine (ALLEGRA) 60 MG tablet  FLOVENT DISKUS 100 MCG/ACT inhaler  LORAZEPAM PO  SIMVASTATIN PO  vitamin D3 (CHOLECALCIFEROL) 50 mcg (2000 units) tablet        Past Medical History:    Past Medical History:   Diagnosis Date    Anxiety     Squamous cell carcinoma of skin, unspecified        Past Surgical History:    Past Surgical History:   Procedure Laterality Date    masectomy          Physical Exam   Patient Vitals for the past 24 hrs:   BP Temp Temp src Pulse Resp SpO2   11/20/23 2300 (!) 146/79 98  F (36.7  C) Oral 99 16 94 %   11/20/23 2250 (!) 170/88 -- -- 86 -- 95 %   11/20/23 2200 (!) 155/80 -- -- 87 (!) 6 94 %   11/20/23 2030 (!) 152/75 -- -- 83 15 100 %   11/20/23 1933 (!) 180/96 98.1  F (36.7  C) Oral 89 16 96 %        Physical  Exam  General:   Well-nourished   Speaking in full sentences  Eyes:   Conjunctiva without injection or scleral icterus   PERRL   EOM full w/out entrapment or proptosis  ENT:   Moist mucous membranes   Posterior oropharynx clear without erythema or exudate   No tonsillar hypertrophy, exudate, asymmetry, nor uvular deviation   No oral lesions   Bilateral TM translucent and gray without air/fluid level or overlying erythema, bony landmarks visualized.   Nares patent   Pinnae normal   No midface swelling, erythema, or asymmetry  Neck:   Full ROM   No stiffness appreciated  Resp:   Faint expiratory wheezing bilaterally   Mild prolongation of expiratory phase   No crackles  CV:    Normal rate, regular rhythm   S1 and S2 present   No murmur, gallop or rub  GI:   BS present   Abdomen soft without distention   Non-tender to light and deep palpation   No guarding or rebound tenderness  Skin:   Warm, dry, well perfused   No rashes or open wounds on exposed skin  MSK:   Moves all extremities   No focal deformities or swelling  Neuro:   Alert   Answers questions appropriately   Moves all extremities equally   Gait stable  Psych:   Normal affect, normal mood        Emergency Department Course   ECG  ECG results from 02/23/23   EKG 12-lead, tracing only     Value    Systolic Blood Pressure     Diastolic Blood Pressure     Ventricular Rate 91    Atrial Rate 91    MA Interval 138    QRS Duration 122        QTc 482    P Axis 43    R AXIS 52    T Axis 9    Interpretation ECG      Sinus rhythm  Right bundle branch block  Abnormal ECG  When compared with ECG of 23-OCT-2022 19:09,  No significant change was found       Imaging:  Chest XR,  PA & LAT   Final Result   IMPRESSION: Slight atelectasis/scarring of the lower right lung. Mild elevation of the left hemidiaphragm. Lungs otherwise clear. Mild cardiomegaly. Normal pulmonary vascularity. No pleural fluid or pneumothorax.         Laboratory:  Labs Ordered and Resulted from Time  of ED Arrival to Time of ED Departure   BASIC METABOLIC PANEL - Abnormal       Result Value    Sodium 137      Potassium 3.6      Chloride 100      Carbon Dioxide (CO2) 27      Anion Gap 10      Urea Nitrogen 9.2      Creatinine 0.83      GFR Estimate 70      Calcium 8.9      Glucose 109 (*)    INFLUENZA A/B, RSV, & SARS-COV2 PCR - Abnormal    Influenza A PCR Negative      Influenza B PCR Negative      RSV PCR Positive (*)     SARS CoV2 PCR Negative     CBC WITH PLATELETS AND DIFFERENTIAL - Abnormal    WBC Count 8.2      RBC Count 4.62      Hemoglobin 14.1      Hematocrit 43.0      MCV 93      MCH 30.5      MCHC 32.8      RDW 12.9      Platelet Count 144 (*)     % Neutrophils 72      % Lymphocytes 16      % Monocytes 7      % Eosinophils 4      % Basophils 1      % Immature Granulocytes 0      NRBCs per 100 WBC 0      Absolute Neutrophils 5.9      Absolute Lymphocytes 1.3      Absolute Monocytes 0.6      Absolute Eosinophils 0.3      Absolute Basophils 0.0      Absolute Immature Granulocytes 0.0      Absolute NRBCs 0.0     LACTIC ACID WHOLE BLOOD - Normal    Lactic Acid 1.6          Procedures   None    Emergency Department Course & Assessments:    Interventions:  Medications   ipratropium - albuterol 0.5 mg/2.5 mg/3 mL (DUONEB) neb solution 3 mL (3 mLs Nebulization $Given 11/20/23 2020)   methylPREDNISolone sodium succinate (solu-MEDROL) injection 125 mg (125 mg Intravenous $Given 11/20/23 2024)   ipratropium - albuterol 0.5 mg/2.5 mg/3 mL (DUONEB) neb solution 3 mL (3 mLs Nebulization $Given 11/20/23 2246)        Assessments:  See below    Independent Interpretation (X-rays, CTs, rhythm strip):  CXR reviewed and no acute infiltrate noted    Consultations/Discussion of Management or Tests:   ED Course as of 11/20/23 2336   Mon Nov 20, 2023   2201 Patient re-evaluated.     2216 Ambulation at trial reveal SpO2 of 90-92% and notable increased work of breathing / wheezing.   2241 Spoke with Dr. Andrade who has accepted  for observation.       Social Determinants of Health affecting care:   None    Disposition:  The patient was discharged to home.     Impression & Plan      Medical Decision Making:  Keisha Kolb is a pleasant 83-year-old female presenting to the ED for evaluation of fatigue and cough.  VS on presentation reveal elevated BP, though otherwise are unremarkable.  History, exam, and ED course as noted above.  History and evaluation most consistent with lower respiratory tract infection and associated bronchospasm, likely triggered by RSV.  Viral testing did return negative for influenza or COVID.  Chest x-ray negative for lobar infiltrate.  Patient has clear evidence of bronchospasm, and was provided to DuoNeb therapies as well as IV Solu-Medrol.  She noted improvement in symptoms following this.  We did attempt a ambulation trial, the patient becomes quite dyspneic and wheezy with this.  Given her advanced age, generalized weakness, will plan for hospital observation for further supportive treatment and care.  At this point I feel systemic antibiotic therapy can be withheld.  Doubt PE given objective pulmonary findings and response to bronchodilator therapy.  Additionally, history evaluation not suggestive of ACS.  We will plan hospital observation under the care of Dr. Andrade for further treatment and care.  Questions answered prior to admission.      Diagnosis:    ICD-10-CM    1. Shortness of breath  R06.02       2. RSV infection  B33.8       3. Generalized weakness  R53.1       11/20/2023   Lawrence Perez MD Roach, Brian Donald, MD  11/20/23 3656

## 2023-11-21 NOTE — PHARMACY-ADMISSION MEDICATION HISTORY
Pharmacist Admission Medication History    Admission medication history is complete. The information provided in this note is only as accurate as the sources available at the time of the update.    Information Source(s): Patient, Family member, and Annika grand-daughter  via in-person    Pertinent Information: none    Changes made to PTA medication list:  Added: None  Deleted: zyrtec, pepcid, prednisone, kenalog cream  Changed: ativan    Medication Affordability:  Not including over the counter (OTC) medications, was there a time in the past 3 months when you did not take your medications as prescribed because of cost?: No    Allergies reviewed with patient and updates made in EHR: yes    Medication History Completed By: Brennan Laguna RPH 11/20/2023 11:34 PM    Prior to Admission medications    Medication Sig Last Dose Taking? Auth Provider Long Term End Date   acetaminophen (TYLENOL) 325 MG tablet Take 2 tablets (650 mg) by mouth every 6 hours as needed for mild pain or other (and adjunct with moderate or severe pain or per patient request) prn Yes Juliana Solorio PA-C     albuterol (PROAIR HFA/PROVENTIL HFA/VENTOLIN HFA) 108 (90 Base) MCG/ACT inhaler Inhale 2 puffs into the lungs every 6 hours as needed for shortness of breath, wheezing or cough  Patient taking differently: Inhale into the lungs every 6 hours as needed for shortness of breath, wheezing or cough prn Yes Nighat Moya MD Yes    CITALOPRAM HYDROBROMIDE PO Take 40 mg by mouth At Bedtime 11/19/2023 Yes Reported, Patient Yes    EPINEPHrine (ANY BX GENERIC EQUIV) 0.3 MG/0.3ML injection 2-pack Inject 0.3 mLs (0.3 mg) into the muscle once as needed for anaphylaxis May repeat one time in 5-15 minutes if response to initial dose is inadequate. prn Yes Eddy Love MD     fexofenadine (ALLEGRA) 60 MG tablet Take 60 mg by mouth 2 times daily 11/20/2023 at am Yes Reported, Patient     FLOVENT DISKUS 100 MCG/ACT inhaler INHALE 1 PUFF BY MOUTH  TWICE DAILY. RINSE MOUTH /. GARGLE AFTER USE 11/20/2023 at am Yes Reported, Patient Yes    LORAZEPAM PO Take 0.5 mg by mouth every morning 11/20/2023 at am Yes Reported, Patient     SIMVASTATIN PO Take 20 mg by mouth At Bedtime 11/19/2023 at hs Yes Reported, Patient Yes    vitamin D3 (CHOLECALCIFEROL) 50 mcg (2000 units) tablet Take 1 tablet by mouth daily 11/20/2023 at am Yes Unknown, Entered By History

## 2023-11-21 NOTE — PLAN OF CARE
[facility]    ED Boarding Nurse Handoff Addendum Report:    Date/time: 11/21/2023, 7:43 AM    Activity Level: standby    Fall Risk: Yes:  nonskid shoes/slippers when out of bed, patient and family education, assistive device/personal items within reach, and activity supervised    Active Infusions: none    Current Meds Due: none    Current care needs: Continuous pulse oximetry, oxygen therapy, nebulizer treatments    Oxygen requirements (liters/min and/or FiO2): 3 LPM    Respiratory status: Nasal cannula    Vital signs (within last 30 minutes):    Vitals:    11/20/23 2200 11/20/23 2250 11/20/23 2300 11/21/23 0300   BP: (!) 155/80 (!) 170/88 (!) 146/79 130/63   BP Location:   Left arm Left arm   Pulse: 87 86 99 96   Resp: (!) 6  16 20   Temp:   98  F (36.7  C) 97.6  F (36.4  C)   TempSrc:   Oral Oral   SpO2: 94% 95% 94% 96%       Focused assessment within last 30 minutes:    A/O x4. VSS on 3 L O2 via NC. Denies pain and nausea. C/o generalized weakness. Dyspnea on exertion. LS diminished. Dry cough present. SBA. Able to make needs known.     ED Boarding Nurse name: Phoebe Barroso RN

## 2023-11-21 NOTE — ED TRIAGE NOTES
Pt. arrived via EMS complaining of cough and fatigue since yesterday. Granddaughter noticed of increasing cough and fatigue today therefore seek for assessment in the hospital. Received duoneb during transport. With oxygen sat of above 90% on room air.  Pt with history of dementia. As per EMS Pt has history of covid and basal pneumonia a year ago.   Pt on Simvastatin.

## 2023-11-21 NOTE — PLAN OF CARE
Pertinent assessments: A&Ox4, forgetful at times. Elevated BP. HR tachy at times. Increased oxygen to 5L oxymask. Lungs diminished. Frequent cough, robitussin given x1. Incontinent of bladder, pure wick in place. Regular diet, good appetite.   Major Shift Events: admitted to floor   Treatment Plan: Prednisone, nebs, tessalon. Monitor respiratory status. Supplemental oxygen.

## 2023-11-21 NOTE — ED NOTES
Bed: ED01  Expected date: 11/20/23  Expected time: 7:15 PM  Means of arrival: Ambulance  Comments:  89 F SOB

## 2023-11-21 NOTE — PROGRESS NOTES
[facility]    ED Boarding Nurse Handoff Addendum Report:    Date/time: 11/21/2023, 8:34 AM    Activity Level: standby    Fall Risk: Yes:  nonskid shoes/slippers when out of bed, arm band in place, patient and family education, assistive device/personal items within reach, and activity supervised    Active Infusions: none     Current Meds Due: see MAR    Current care needs: Monitor respiratory status, wean O2 as tolerated. Nebs per orders.     Oxygen requirements (liters/min and/or FiO2): 3L    Respiratory status: Nasal cannula    Vital signs (within last 30 minutes):    Vitals:    11/20/23 2250 11/20/23 2300 11/21/23 0300 11/21/23 0810   BP: (!) 170/88 (!) 146/79 130/63 (!) 149/85   BP Location:  Left arm Left arm Right arm   Pulse: 86 99 96 83   Resp:  16 20 20   Temp:  98  F (36.7  C) 97.6  F (36.4  C) 98.2  F (36.8  C)   TempSrc:  Oral Oral Oral   SpO2: 95% 94% 96%        Focused assessment within last 30 minutes:    A&Ox4. Tolerating regular diet. Using BSC with SBA. SOB with exertion and at rest, patient reports some improvement in SOB. Denies pain. Fine crackles noted to bilateral lower lungs. Dry cough noted. Contact & droplet precautions mainted. Right PIV: SL. Duoneb administered x1. Attempted to wean oxygen from 3L to 2L, tolerated for a short time, O2 dropped to 89%. Has been 93-97% on 3L.     ED Boarding Nurse name: Noy Herzog RN

## 2023-11-21 NOTE — H&P
Hospitalist Admission   History and Physical    CC:  SOB, wheezing    HPI:  84 yo female hx of dementia and anxiety who presented with cough, SOB, and wheezing    Patient is a suboptimal historian due to dementia hx.  She is cared for at home by her granddaughter who is present in the ER this evening.      History is notable for the patient being diagnosed with COVID 1 year ago.  Since that time she has had persistent SOB with intermittent wheezing and bronchospasm sx.  She does not have a formal diagnosis of asthma or COPD, but she does take a daily maintenance inhaler and a rescue inhaler when needed    Patient's great granddaughter developed a cough in the past week.  She was tested for RSV and was negative.  Patient began to develop SOB, cough, and wheezing symptoms in the past several days    ER course:  VS notable for elevated BP.  No fever or hypoxia    Labs notable for positive RSV PCR.  COVID and influenza neg.  WBC count normal    CXR shows no acute appearing infiltrates    I spoke with the ER provider.  Pt was administered nebulizers and 125 mg IV solumedrol in the ER.  When they attempted to ambulate the patient in the ER, she became very SOB and oxygen sats dropped to 90%.  Observation admission is requested    PMH:  Past Medical History:   Diagnosis Date    Anxiety     Squamous cell carcinoma of skin, unspecified         Medications:  Current Facility-Administered Medications   Medication    ipratropium - albuterol 0.5 mg/2.5 mg/3 mL (DUONEB) 0.5-2.5 (3) MG/3ML neb solution     Current Outpatient Medications   Medication    acetaminophen (TYLENOL) 325 MG tablet    albuterol (PROAIR HFA/PROVENTIL HFA/VENTOLIN HFA) 108 (90 Base) MCG/ACT inhaler    cetirizine (ZYRTEC) 10 MG tablet    citalopram (CELEXA) 40 MG tablet    CITALOPRAM HYDROBROMIDE PO    EPINEPHrine (ANY BX GENERIC EQUIV) 0.3 MG/0.3ML injection 2-pack    famotidine (PEPCID) 20 MG tablet    fexofenadine (ALLEGRA) 60 MG tablet    FLOVENT DISKUS  100 MCG/ACT inhaler    LORAZEPAM PO    predniSONE (DELTASONE) 20 MG tablet    SIMVASTATIN PO    triamcinolone (KENALOG) 0.1 % external cream    vitamin D3 (CHOLECALCIFEROL) 50 mcg (2000 units) tablet        Allergies:   No Known Allergies     Family History:  noncontributory    Social History:  denies smoking or ETOH use.  Code status is DNR/DNI    Review of Systems: Comprehensive greater than 10 point review systems otherwise negative besides that detailed above    Physical exam:  BP (!) 152/75   Pulse 83   Temp 98.1  F (36.7  C) (Oral)   Resp 15   SpO2 100%    PSYCH: pleasant, oriented, No acute distress.  HEART:  Normal S1, S2 with no edema.  LUNGS:  few brief exp wheezes.  No tachypnea or conversational dyspnea  ABDOMEN:  Soft, no hepatosplenomegaly, normal bowel sounds.  SKIN:  Dry to touch, No rash.     Pertinent laboratory and imaging data reviewed above in HPI         Impression:  82 yo female hx of dementia and anxiety who presented with cough, SOB, and wheezing.  She was found to have RSV on PCR testing this evening.      Bronchospasm/RAD exacerbation due to acute RSV infection.  Symptoms have improved with treatment in the ER    Dementia hx, cared for by granddaughter at home    Anxiety history; takes daily lorazepam 0.5 mg tablet daily and a selective serotonin reuptake inhibitor to manage her symptoms      Plan:  - prednisone 40 mg daily.  Brief course anticipated  - albuterol neb/MDI prn  - resume home meds including her maintenance inhaler when clarified by pharmacy      obs admit; probable discharge tomorrow    I updated the patient's granddaughter at bedside on admission    CODE STATUS:  DNR/DNI as discussed with patient and granddaughter on admit

## 2023-11-21 NOTE — PROGRESS NOTES
[facility]  Hospitalist Progress Note  Kwadwo Garcia MD 11/21/23    Reason for Stay (Diagnosis): RSV, reactive airway disease exacerbation         Assessment and Plan:      Summary of Stay: Keisha Kolb is a 83 year old female with history of some degree of dementia who lives at home and is cared for by her granddaughter, HLD, anxiety, and intermittent episodes of shortness of breath and wheezing ever since COVID-19 infection last year without formal diagnosis of asthma or COPD who was admitted on 11/20/2023 after presenting with very frequent nonproductive cough and shortness of breath with wheezing for the last few days.  Patient's granddaughter was ill with a cough for a few days before her symptoms started.  Came to the ER due to severe shortness of breath.    In the ER she was afebrile and initially hypertensive.  O2 sats in the low 90s at rest, however when she ambulates they drop to 90% and she looks very short of breath.  She was wheezing significantly in the ER.  CBC and BMP are unremarkable.  Lactic acid not elevated.  Chest x-ray did not show any infiltrate.  RSV PCR is negative which is the etiology for cough resulting in a reactive airway disease exacerbation.  Received IV Solu-Medrol and nebs and admitted to observation.  Continuing prednisone and frequent nebs.    Problem List/Assessment and Plan:   RSV infection  Reactive airway disease exacerbation: No formal diagnosis of asthma or COPD, however has had issues with bronchospasm and wheezing with intermittent shortness of breath since having COVID-19 1 year ago.  Presenting here with nonproductive frequent cough for the past few days with sick contact with similar symptoms being her granddaughter who cares for her.  For the last couple of days she has had significant shortness of breath and wheezing.  RSV PCR is positive.  COVID-19, influenza A/B PCR negative.  Wheezing and shortness of breath secondary to reactive airway  disease exacerbation.  Chest x-ray does not show any significant infiltrate.  O2 sats 90% with ambulation and very short of breath so on [place] supplemental oxygen now.  -Continue prednisone 40 mg daily  -DuoNebs scheduled 4 times daily and albuterol nebs as needed  -Schedule Tessalon Perles 3 times daily, guaifenesin as needed  -Continuous pulse ox and wean supplemental oxygen as able  -Resume Flovent when she returns home    Dementia: Granddaughter cares for her at home.  Son was present at the bedside today.  She is answering all symptom-based questions appropriately and is generally aware of what is going on with his hospitalization.    Anxiety: Resume citalopram and lorazepam in the morning.    HLD: Can resume simvastatin at home after discharge.    Mild thrombocytopenia: Platelet count slightly lower than baseline at 144 in the ER.  Nothing further to work-up at this time.    DVT Prophylaxis: Pneumatic Compression Devices  Code Status: DNR / DNI -this was discussed on admission  FEN: Regular diet  Discharge Dispo: Home with granddaughter who cares for her  Estimated Disch Date / # of Days until Disch: Home tomorrow if weaned to room air otherwise 1 more day    Clinically Significant Risk Factors Present on Admission                                              Interval History (Subjective):      Frequent nonproductive cough.  Some shortness of breath from baseline, better than when she was in the ER.  Afebrile currently.  No nausea.                  Physical Exam:      Last Vital Signs:  BP (!) 169/69   Pulse 103   Temp 98.1  F (36.7  C) (Oral)   Resp 20   SpO2 94%       Intake/Output Summary (Last 24 hours) at 11/21/2023 1346  Last data filed at 11/21/2023 1000  Gross per 24 hour   Intake 240 ml   Output --   Net 240 ml       Constitutional: Awake, NAD  Eyes: sclera white  HEENT: atraumatic, MMM  Respiratory: Occasional cough in the room.  Scattered bilateral crackles and slight expiratory  wheeze.  Cardiovascular: RRR.  No murmur  GI: non-tender, not distended, bowel sounds present  Skin: no rash   Musculoskeletal/extremities: No significant lower extremity edema  Neurologic: Alert, speech clear, answering all symptom-based questions appropriately   Psychiatric: calm, cooperative         Medications:      All current medications were reviewed with changes reflected in problem list.         Data:      All new lab and imaging data were personally reviewed.   Labs:  Recent Labs   Lab 11/21/23 0807 11/20/23 2008   NA  --  137   POTASSIUM  --  3.6   CHLORIDE  --  100   CO2  --  27   ANIONGAP  --  10   * 109*   BUN  --  9.2   CR  --  0.83   GFRESTIMATED  --  70   KRISTEL  --  8.9     Recent Labs   Lab 11/20/23 2008   WBC 8.2   HGB 14.1   HCT 43.0   MCV 93   *   RSV PCR positive  Imaging:   Recent Results (from the past 24 hour(s))   Chest XR,  PA & LAT    Narrative    EXAM: XR CHEST 2 VIEWS  LOCATION: [facility]  DATE: 11/20/2023    INDICATION: cough, pneumonia?  COMPARISON: Chest radiographs 6/23/2023 and 2/23/2023. CTA chest 10/24/2022.      Impression    IMPRESSION: Slight atelectasis/scarring of the lower right lung. Mild elevation of the left hemidiaphragm. Lungs otherwise clear. Mild cardiomegaly. Normal pulmonary vascularity. No pleural fluid or pneumothorax.         Kwadwo Garcia MD

## 2023-11-21 NOTE — PROGRESS NOTES
"[facility] RCAT     Date: 11/21/2023  Admission Dx: RSV+, RAD  Pulmonary History RAD  Home Nebulizer/MDI Use: albuterol MDI prn   Home Oxygen: N/A  Acuity Level (RCAT flow sheet): 3  Aerosol Therapy initiated: Duoneb QID, Albuterol Q2 prn      Pulmonary Hygiene initiated: deep breath and cough       Volume Expansion initiated: IS       Current Oxygen Requirements: 5 lpm NC  Current SpO2: 94    Re-evaluation date: 11/24/2023    Patient Education: indications and benefits of bronchodilators.       See \"RT Assessments\" flow sheet for patient assessment scoring and Acuity Level Details.           "

## 2023-11-22 PROCEDURE — G0378 HOSPITAL OBSERVATION PER HR: HCPCS

## 2023-11-22 PROCEDURE — 250N000013 HC RX MED GY IP 250 OP 250 PS 637: Performed by: INTERNAL MEDICINE

## 2023-11-22 PROCEDURE — 99232 SBSQ HOSP IP/OBS MODERATE 35: CPT | Performed by: INTERNAL MEDICINE

## 2023-11-22 PROCEDURE — 120N000001 HC R&B MED SURG/OB

## 2023-11-22 PROCEDURE — 250N000012 HC RX MED GY IP 250 OP 636 PS 637: Performed by: INTERNAL MEDICINE

## 2023-11-22 PROCEDURE — 250N000009 HC RX 250: Performed by: INTERNAL MEDICINE

## 2023-11-22 PROCEDURE — 94640 AIRWAY INHALATION TREATMENT: CPT | Mod: 76

## 2023-11-22 PROCEDURE — 999N000157 HC STATISTIC RCP TIME EA 10 MIN

## 2023-11-22 RX ADMIN — BENZONATATE 100 MG: 100 CAPSULE ORAL at 22:22

## 2023-11-22 RX ADMIN — IPRATROPIUM BROMIDE AND ALBUTEROL SULFATE 3 ML: .5; 3 SOLUTION RESPIRATORY (INHALATION) at 11:44

## 2023-11-22 RX ADMIN — GUAIFENESIN 200 MG: 200 SOLUTION ORAL at 20:46

## 2023-11-22 RX ADMIN — IPRATROPIUM BROMIDE AND ALBUTEROL SULFATE 3 ML: .5; 3 SOLUTION RESPIRATORY (INHALATION) at 08:01

## 2023-11-22 RX ADMIN — IPRATROPIUM BROMIDE AND ALBUTEROL SULFATE 3 ML: .5; 3 SOLUTION RESPIRATORY (INHALATION) at 16:20

## 2023-11-22 RX ADMIN — GUAIFENESIN 200 MG: 200 SOLUTION ORAL at 08:36

## 2023-11-22 RX ADMIN — LORAZEPAM 0.5 MG: 0.5 TABLET ORAL at 08:36

## 2023-11-22 RX ADMIN — FEXOFENADINE HYDROCHLORIDE 60 MG: 60 TABLET ORAL at 20:46

## 2023-11-22 RX ADMIN — FEXOFENADINE HYDROCHLORIDE 60 MG: 60 TABLET ORAL at 08:36

## 2023-11-22 RX ADMIN — CITALOPRAM HYDROBROMIDE 40 MG: 20 TABLET, FILM COATED ORAL at 22:22

## 2023-11-22 RX ADMIN — BENZONATATE 100 MG: 100 CAPSULE ORAL at 16:10

## 2023-11-22 RX ADMIN — IPRATROPIUM BROMIDE AND ALBUTEROL SULFATE 3 ML: .5; 3 SOLUTION RESPIRATORY (INHALATION) at 19:12

## 2023-11-22 RX ADMIN — SENNOSIDES AND DOCUSATE SODIUM 1 TABLET: 8.6; 5 TABLET ORAL at 08:35

## 2023-11-22 RX ADMIN — PREDNISONE 40 MG: 20 TABLET ORAL at 08:35

## 2023-11-22 RX ADMIN — BENZONATATE 100 MG: 100 CAPSULE ORAL at 08:36

## 2023-11-22 RX ADMIN — SENNOSIDES AND DOCUSATE SODIUM 1 TABLET: 8.6; 5 TABLET ORAL at 20:46

## 2023-11-22 ASSESSMENT — ACTIVITIES OF DAILY LIVING (ADL)
ADLS_ACUITY_SCORE: 47

## 2023-11-22 NOTE — UTILIZATION REVIEW
"  Admission Status; Secondary Review Determination         Under the authority of the Utilization Management Committee, the utilization review process indicated a secondary review on the above patient.  The review outcome is based on review of the medical records, discussions with staff, and applying clinical experience noted on the date of the review.        (xxx)      Inpatient Status Appropriate - This patient's medical care is consistent with medical management for inpatient care and reasonable inpatient medical practice.      () Observation Status Appropriate - This patient does not meet hospital inpatient criteria and is placed in observation status. If this patient's primary payer is Medicare and was admitted as an inpatient, Condition Code 44 should be used and patient status changed to \"observation\".   () Admission Status NOT Appropriate - This patient's medical care is not consistent with medical management for Inpatient or Observation Status.          RATIONALE FOR DETERMINATION     83 year old female with history of some degree of dementia who lives at home and is cared for by her granddaughter, HLD, anxiety, and intermittent episodes of shortness of breath and wheezing ever since COVID-19 infection last year without formal diagnosis of asthma or COPD who was admitted on 11/20/2023 with RSV infection causing hypoxemia. Currently the patient is saturating in the mid 90's on 4 liters per minute consistent with acute hypoxemic respiratory failure and so inpatient criteria recommended, discussed with Dr. Mcfadden. She remains very wheezy.    The severity of illness, intensity of service provided, expected LOS and risk for adverse outcome make the care complex, high risk and appropriate for hospital admission.        The information on this document is developed by the utilization review team in order for the business office to ensure compliance.  This only denotes the appropriateness of proper admission " status and does not reflect the quality of care rendered.         The definitions of Inpatient Status and Observation Status used in making the determination above are those provided in the CMS Coverage Manual, Chapter 1 and Chapter 6, section 70.4.      Sincerely,     Julio Shaikh DO  Physician Advisor  Utilization Review/ Case Management  [facility].

## 2023-11-22 NOTE — PROGRESS NOTES
Care Management Follow Up    Length of Stay (days): 0    Expected Discharge Date: 2023     Concerns to be Addressed:  Disposition     Patient plan of care discussed at interdisciplinary rounds: Yes    Anticipated Discharge Disposition:  home with family     Anticipated Discharge Services:  none  Anticipated Discharge DME:  none    Patient/family educated on Medicare website which has current facility and service quality ratings:  NA  Education Provided on the Discharge Plan:  yes, discussed her observation level of care, she signed the MARTINEZ letter and faxed to [facility]  Patient/Family in Agreement with the Plan:  Home with family    Referrals Placed by CM/SW:  NA  Private pay costs discussed: Not applicable @ this time    Additional Information:  Met with patient to discus her observation care and have her sign the MOON which she did. She reports that she lives in her own home and her granddaughter and her  and child live in her basement. She reports that she is independent with activities of daily living and with her IADLs. Her grandaughter and her family have lived in [facility]'s home since her   5 years ago. If she needs anything they are very helpful to her.    Plan is for patient to return to her home upon discharge.    Family will transport.    SAVITA Alarcon   Inpatient Care Coordination   Supervisor  [facility]  [phone]      SAVITA Lee

## 2023-11-22 NOTE — PLAN OF CARE
Pertinent assessments: A&Ox4, forgetful and anxious at times. Remains on 4L NC, unable to wean. Lungs diminished. Frequent cough. Robitussin given x1. Incontinent at times. Regular diet, fair appetite. Up SBA. Alarms on for safety.   Major Shift Events: none   Treatment Plan: Prednisone, nebs, tessalon. Monitor respiratory status. Supplemental oxygen.

## 2023-11-22 NOTE — PLAN OF CARE
Pertinent assessments: A&Ox3, can be forgetful at times. Elevated bp. 4L NC with sats on mid 90s, pt still reports being SOB. PRN robitussin given x1. Incontinent of bladder, external cath in place. No bm on shift. Not OOB on shift.    Major Shift Events: uneventful    Treatment Plan: Prednisone, Nebs, antitussives, monitor respiratory status. Wean off supplemental oxygen.    Bedside Nurse: Melissa Cortes RN         Goal Outcome Evaluation:      Plan of Care Reviewed With: patient    Overall Patient Progress: improvingOverall Patient Progress: improving    Outcome Evaluation: pt had elevated bp. on 4 L NC.

## 2023-11-22 NOTE — PLAN OF CARE
[id number]    Assessments:   A&Ox3, forgetful at times. Weaned to 4L oxymask, sats on mid 90s, patient refuses nasal cannula. Scheduled tessalon given. Incontinent of bladder, external cath in place. No bm.     Treatment Plan: daily Prednisone, nebs, antitussives, monitor respiratory status. Supplemental oxygen - wean as able.     Bedside Nurse: Jan Sanchez RN

## 2023-11-23 LAB
ANION GAP SERPL CALCULATED.3IONS-SCNC: 9 MMOL/L (ref 7–15)
BASOPHILS # BLD AUTO: 0 10E3/UL (ref 0–0.2)
BASOPHILS NFR BLD AUTO: 0 %
BUN SERPL-MCNC: 18.4 MG/DL (ref 8–23)
CALCIUM SERPL-MCNC: 8.4 MG/DL (ref 8.8–10.2)
CHLORIDE SERPL-SCNC: 103 MMOL/L (ref 98–107)
CREAT SERPL-MCNC: 0.89 MG/DL (ref 0.51–0.95)
DEPRECATED HCO3 PLAS-SCNC: 27 MMOL/L (ref 22–29)
EGFRCR SERPLBLD CKD-EPI 2021: 64 ML/MIN/1.73M2
EOSINOPHIL # BLD AUTO: 0 10E3/UL (ref 0–0.7)
EOSINOPHIL NFR BLD AUTO: 0 %
ERYTHROCYTE [DISTWIDTH] IN BLOOD BY AUTOMATED COUNT: 13.2 % (ref 10–15)
GLUCOSE SERPL-MCNC: 112 MG/DL (ref 70–99)
HCT VFR BLD AUTO: 41 % (ref 35–47)
HGB BLD-MCNC: 13.1 G/DL (ref 11.7–15.7)
IMM GRANULOCYTES # BLD: 0.1 10E3/UL
IMM GRANULOCYTES NFR BLD: 1 %
LYMPHOCYTES # BLD AUTO: 1.1 10E3/UL (ref 0.8–5.3)
LYMPHOCYTES NFR BLD AUTO: 9 %
MCH RBC QN AUTO: 30.1 PG (ref 26.5–33)
MCHC RBC AUTO-ENTMCNC: 32 G/DL (ref 31.5–36.5)
MCV RBC AUTO: 94 FL (ref 78–100)
MONOCYTES # BLD AUTO: 1 10E3/UL (ref 0–1.3)
MONOCYTES NFR BLD AUTO: 8 %
NEUTROPHILS # BLD AUTO: 9.7 10E3/UL (ref 1.6–8.3)
NEUTROPHILS NFR BLD AUTO: 82 %
NRBC # BLD AUTO: 0 10E3/UL
NRBC BLD AUTO-RTO: 0 /100
PLATELET # BLD AUTO: 156 10E3/UL (ref 150–450)
POTASSIUM SERPL-SCNC: 4.3 MMOL/L (ref 3.4–5.3)
RBC # BLD AUTO: 4.35 10E6/UL (ref 3.8–5.2)
SODIUM SERPL-SCNC: 139 MMOL/L (ref 135–145)
WBC # BLD AUTO: 11.9 10E3/UL (ref 4–11)

## 2023-11-23 PROCEDURE — 94640 AIRWAY INHALATION TREATMENT: CPT | Mod: 76

## 2023-11-23 PROCEDURE — 36415 COLL VENOUS BLD VENIPUNCTURE: CPT | Performed by: INTERNAL MEDICINE

## 2023-11-23 PROCEDURE — 99232 SBSQ HOSP IP/OBS MODERATE 35: CPT | Performed by: INTERNAL MEDICINE

## 2023-11-23 PROCEDURE — 85025 COMPLETE CBC W/AUTO DIFF WBC: CPT | Performed by: INTERNAL MEDICINE

## 2023-11-23 PROCEDURE — 120N000001 HC R&B MED SURG/OB

## 2023-11-23 PROCEDURE — 250N000013 HC RX MED GY IP 250 OP 250 PS 637: Performed by: INTERNAL MEDICINE

## 2023-11-23 PROCEDURE — 999N000157 HC STATISTIC RCP TIME EA 10 MIN

## 2023-11-23 PROCEDURE — 250N000012 HC RX MED GY IP 250 OP 636 PS 637: Performed by: INTERNAL MEDICINE

## 2023-11-23 PROCEDURE — 94640 AIRWAY INHALATION TREATMENT: CPT

## 2023-11-23 PROCEDURE — 250N000009 HC RX 250: Performed by: INTERNAL MEDICINE

## 2023-11-23 PROCEDURE — 80048 BASIC METABOLIC PNL TOTAL CA: CPT | Performed by: INTERNAL MEDICINE

## 2023-11-23 RX ADMIN — PREDNISONE 40 MG: 20 TABLET ORAL at 08:05

## 2023-11-23 RX ADMIN — IPRATROPIUM BROMIDE AND ALBUTEROL SULFATE 3 ML: .5; 3 SOLUTION RESPIRATORY (INHALATION) at 11:44

## 2023-11-23 RX ADMIN — BENZONATATE 100 MG: 100 CAPSULE ORAL at 21:41

## 2023-11-23 RX ADMIN — GUAIFENESIN 200 MG: 200 SOLUTION ORAL at 15:54

## 2023-11-23 RX ADMIN — BENZONATATE 100 MG: 100 CAPSULE ORAL at 15:54

## 2023-11-23 RX ADMIN — SENNOSIDES AND DOCUSATE SODIUM 1 TABLET: 8.6; 5 TABLET ORAL at 08:05

## 2023-11-23 RX ADMIN — IPRATROPIUM BROMIDE AND ALBUTEROL SULFATE 3 ML: .5; 3 SOLUTION RESPIRATORY (INHALATION) at 19:37

## 2023-11-23 RX ADMIN — SENNOSIDES AND DOCUSATE SODIUM 2 TABLET: 8.6; 5 TABLET ORAL at 20:07

## 2023-11-23 RX ADMIN — GUAIFENESIN 200 MG: 200 SOLUTION ORAL at 20:53

## 2023-11-23 RX ADMIN — IPRATROPIUM BROMIDE AND ALBUTEROL SULFATE 3 ML: .5; 3 SOLUTION RESPIRATORY (INHALATION) at 15:09

## 2023-11-23 RX ADMIN — ACETAMINOPHEN 650 MG: 325 TABLET, FILM COATED ORAL at 05:18

## 2023-11-23 RX ADMIN — FEXOFENADINE HYDROCHLORIDE 60 MG: 60 TABLET ORAL at 20:07

## 2023-11-23 RX ADMIN — FEXOFENADINE HYDROCHLORIDE 60 MG: 60 TABLET ORAL at 08:05

## 2023-11-23 RX ADMIN — IPRATROPIUM BROMIDE AND ALBUTEROL SULFATE 3 ML: .5; 3 SOLUTION RESPIRATORY (INHALATION) at 00:08

## 2023-11-23 RX ADMIN — LORAZEPAM 0.5 MG: 0.5 TABLET ORAL at 08:05

## 2023-11-23 RX ADMIN — CITALOPRAM HYDROBROMIDE 40 MG: 20 TABLET, FILM COATED ORAL at 21:41

## 2023-11-23 RX ADMIN — IPRATROPIUM BROMIDE AND ALBUTEROL SULFATE 3 ML: .5; 3 SOLUTION RESPIRATORY (INHALATION) at 07:36

## 2023-11-23 RX ADMIN — BENZONATATE 100 MG: 100 CAPSULE ORAL at 08:05

## 2023-11-23 RX ADMIN — GUAIFENESIN 200 MG: 200 SOLUTION ORAL at 05:18

## 2023-11-23 ASSESSMENT — ACTIVITIES OF DAILY LIVING (ADL)
ADLS_ACUITY_SCORE: 48
ADLS_ACUITY_SCORE: 47
ADLS_ACUITY_SCORE: 47
ADLS_ACUITY_SCORE: 48
ADLS_ACUITY_SCORE: 47

## 2023-11-23 NOTE — PLAN OF CARE
Goal Outcome Evaluation:      Plan of Care Reviewed With: patient    Overall Patient Progress: improvingOverall Patient Progress: improving  To Do:  End of Shift Summary  For vital signs and complete assessments, please see documentation flowsheets.     Pertinent assessments: A&Ox4, forgetful and anxious intermittently through out the night. Denies nausea. Dyspnea on exertion. PRN albuterol given times x 1. Stats remains on low 90's, on 4L NC, unable to wean. Lungs diminished. Frequent cough. Robitussin given x 2. And tylenol given x 1.Incontinent due to frequent coughing. Refused pure wick. Ambulated to the bathroom twice. Voiding without issues. No BM on this shift.    Major Shift Events: Elevated B, and increased confusion    Treatment Plan: Prednisone, nebs, tessalon. Monitor respiratory status. Supplemental oxygen.     Bedside Nurse: Cliff Carrero RN

## 2023-11-23 NOTE — PROGRESS NOTES
[facility]  Hospitalist Progress Note  Bonnie Chow MD     Reason for Stay (Diagnosis): RSV, reactive airway disease exacerbation         Assessment and Plan:      Summary of Stay: Keisha Kolb is a 83 year old female with history of some degree of dementia who lives at home and is cared for by her granddaughter, HLD, anxiety, and intermittent episodes of shortness of breath and wheezing ever since COVID-19 infection last year without formal diagnosis of asthma or COPD who was admitted on 11/20/2023 after presenting with very frequent nonproductive cough and shortness of breath with wheezing for the last few days.  Patient's granddaughter was ill with a cough for a few days before her symptoms started.  Came to the ER due to severe shortness of breath.    In the ER she was afebrile and initially hypertensive.  O2 sats in the low 90s at rest, however when she ambulates they drop to 90% and she looks very short of breath.  She was wheezing significantly in the ER.  CBC and BMP are unremarkable.  Lactic acid not elevated.  Chest x-ray did not show any infiltrate.  RSV PCR is negative which is the etiology for cough resulting in a reactive airway disease exacerbation.  Received IV Solu-Medrol and nebs. Admitted to inpatient. Continue treatment and attempt to wean O2.    Problem List/Assessment and Plan:   RSV infection  Reactive airway disease exacerbation  Acute respiratory failure with hypoxia  No formal diagnosis of asthma or COPD, however has had issues with bronchospasm and wheezing with intermittent shortness of breath since having COVID-19 a year ago.  Presented here with nonproductive frequent cough for the past few days with sick contact with similar symptoms being her granddaughter who cares for her.  For the last couple of days she has had significant shortness of breath and wheezing.  RSV PCR is positive.  COVID-19, influenza A/B PCR negative.  Wheezing and shortness of breath  secondary to reactive airway disease exacerbation.  Chest x-ray does not show any significant infiltrate.    - Currently requiring 4L O2 at rest. Will attempt to wean as tolerated.   - Continue prednisone 40 mg daily  - DuoNebs scheduled 4 times daily and albuterol nebs as needed  - Schedule Tessalon Perles 3 times daily, guaifenesin as needed  - Continuous pulse ox and wean supplemental oxygen as able  - Resume Flovent when she returns home    Dementia  Granddaughter cares for her at home.    Anxiety:   Continue PTA citalopram and lorazepam     HLD: Can resume simvastatin at home after discharge.    Mild thrombocytopenia: Platelet count slightly lower than baseline at 144 in the ER.  Nothing further to work-up at this time.    DVT Prophylaxis: Pneumatic Compression Devices  Code Status: DNR / DNI   FEN: Regular diet  Discharge Dispo: Home with granddaughter who cares for her  Estimated Disch Date / # of Days until Disch: Anticipate hospitalization at least 2-3 more4 days based on continued oxygen requirements and degree of wheezing.      Clinically Significant Risk Factors Present on Admission                                              Interval History (Subjective):      Frequent cough. Continued O2 requirement of 4L. Ongoing wheezing and requiring scheduled nebs. Admitted to inpatient. Otherwise no new complaints.                   Physical Exam:      Last Vital Signs:  BP (!) 149/74   Pulse 82   Temp 98.6  F (37  C) (Oral)   Resp 18   SpO2 96%     Constitutional: Pleasant older woman resting in bed. Appears acutely ill. Frequent cough, mildly increased work of breathing noted. On 4 L O2. Expiratory wheezes. Alert and answering questions appropriately.   HEENT: NCAT. EOMI. Dry oral mucosa.  Respiratory: Frequent cough. Slight increased work of breathing. 4L O2. Expiratory wheeze. Receiving frequent neb treatments.   Cardiovascular: Regular rate and rhythm. No murmur.  GI: Soft, nontender, nondistended.  Normoactive bowel sounds.   Musculoskeletal: No gross deformities.   Neurologic: Alert. Answering questions appropriately. No focal neurologic deficits. Did not assess gait.         Medications:      All current medications were reviewed with changes reflected in problem list.         Data:      All new lab and imaging data were personally reviewed.   Labs:  Recent Labs   Lab 11/21/23  0807 11/20/23 2008   NA  --  137   POTASSIUM  --  3.6   CHLORIDE  --  100   CO2  --  27   ANIONGAP  --  10   * 109*   BUN  --  9.2   CR  --  0.83   GFRESTIMATED  --  70   KRISTEL  --  8.9     Recent Labs   Lab 11/20/23 2008   WBC 8.2   HGB 14.1   HCT 43.0   MCV 93   *   RSV PCR positive  Imaging:   Recent Results (from the past 24 hour(s))   Chest XR,  PA & LAT    Narrative    EXAM: XR CHEST 2 VIEWS  LOCATION: [facility]  DATE: 11/20/2023    INDICATION: cough, pneumonia?  COMPARISON: Chest radiographs 6/23/2023 and 2/23/2023. CTA chest 10/24/2022.      Impression    IMPRESSION: Slight atelectasis/scarring of the lower right lung. Mild elevation of the left hemidiaphragm. Lungs otherwise clear. Mild cardiomegaly. Normal pulmonary vascularity. No pleural fluid or pneumothorax.         Bonnie Chow MD

## 2023-11-23 NOTE — PROGRESS NOTES
[facility]  Hospitalist Progress Note  Bonnie Chow MD     Reason for Stay (Diagnosis): RSV, reactive airway disease exacerbation         Assessment and Plan:      Summary of Stay: Keisha Kolb is a 83 year old female with history of some degree of dementia who lives at home and is cared for by her granddaughter, HLD, anxiety, and intermittent episodes of shortness of breath and wheezing ever since COVID-19 infection last year without formal diagnosis of asthma or COPD who was admitted on 11/20/2023 after presenting with very frequent nonproductive cough and shortness of breath with wheezing for the last few days.  Patient's granddaughter was ill with a cough for a few days before her symptoms started. Came to the ER due to severe shortness of breath.    In the ER she was afebrile and initially hypertensive.  O2 sats in the low 90s at rest, however when she ambulates they drop to 90% and she looks very short of breath.  She was wheezing significantly in the ER.  CBC and BMP are unremarkable.  Lactic acid not elevated.  Chest x-ray did not show any infiltrate.  RSV PCR is negative which is the etiology for cough resulting in a reactive airway disease exacerbation.  Received IV Solu-Medrol and nebs. Admitted to inpatient. Continue treatment and attempt to wean O2.    Problem List/Assessment and Plan:   RSV infection  Reactive airway disease exacerbation  Acute respiratory failure with hypoxia  No formal diagnosis of asthma or COPD, however has had issues with bronchospasm and wheezing with intermittent shortness of breath since having COVID-19 a year ago.  Presented here with nonproductive frequent cough for the past few days with sick contact with similar symptoms being her granddaughter who cares for her.  For the last couple of days she has had significant shortness of breath and wheezing.  RSV PCR is positive.  COVID-19, influenza A/B PCR negative.  Wheezing and shortness of breath  secondary to reactive airway disease exacerbation.  Chest x-ray does not show any significant infiltrate.    - Currently requiring 4L O2 at rest. Was 86% on room air at rest on 11/23 when patient had briefly removed her own O2. Continue to wean as tolerated.   - Continue prednisone 40 mg daily  - DuoNebs scheduled 4 times daily and albuterol nebs as needed  - Schedule Tessalon Perles 3 times daily, guaifenesin as needed  - Continuous pulse ox and wean supplemental oxygen as able  - Resume Flovent when she returns home    Dementia  Granddaughter cares for her at home.    Anxiety:   Continue PTA citalopram and lorazepam     HLD: Can resume simvastatin at home after discharge.    Mild thrombocytopenia: Platelet count slightly lower than baseline at 144 in the ER.  Nothing further to work-up at this time.    DVT Prophylaxis: Pneumatic Compression Devices  Code Status: DNR / DNI   FEN: Regular diet  Discharge Dispo: Home with granddaughter who cares for her  Estimated Disch Date / # of Days until Disch: Anticipate hospitalization at least 2-3 more days based on continued oxygen requirements and degree of wheezing.      Clinically Significant Risk Factors Present on Admission                                              Interval History (Subjective):      Frequent cough. Patient had removed her own O2 and was 86% on room air at rest. 4L O2 returned and patient satting 90-91%. Ongoing wheezing. Continue current therapies. Patient with dementia and no reliable information gleaned on review of systems. Declines any complaints while she was actively coughing.                   Physical Exam:      Last Vital Signs:  BP (!) 147/69 (BP Location: Left arm)   Pulse 87   Temp 98.4  F (36.9  C) (Oral)   Resp 20   SpO2 95%     Constitutional: Pleasant older woman resting in bed. On room air when I entered with O2 canula across room. Patient satting 86% on room air and 4L O2 placed with improvement in sats. Alert but not answering  questions appropriately.   HEENT: NCAT. EOMI. Dry oral mucosa.  Respiratory: Frequent cough. Back on 4 L O2. Continues to have expiratory wheezes. Receiving frequent neb treatments.   Cardiovascular: Regular rate and rhythm. No murmur.  GI: Soft, nontender, nondistended. Normoactive bowel sounds.   Musculoskeletal: No gross deformities.   Neurologic: Alert, but not answering questions appropriately. Known dementia. No focal neurologic deficits. Did not assess gait.         Medications:      All current medications were reviewed with changes reflected in problem list.         Data:      All new lab and imaging data were personally reviewed.   Labs:  Recent Labs   Lab 11/23/23 0652 11/21/23  0807 11/20/23 2008     --  137   POTASSIUM 4.3  --  3.6   CHLORIDE 103  --  100   CO2 27  --  27   ANIONGAP 9  --  10   * 233* 109*   BUN 18.4  --  9.2   CR 0.89  --  0.83   GFRESTIMATED 64  --  70   KRISTEL 8.4*  --  8.9     Recent Labs   Lab 11/23/23  0652 11/20/23 2008   WBC 11.9* 8.2   HGB 13.1 14.1   HCT 41.0 43.0   MCV 94 93    144*   RSV PCR positive      Bonnie Chow MD

## 2023-11-24 ENCOUNTER — APPOINTMENT (OUTPATIENT)
Dept: GENERAL RADIOLOGY | Facility: CLINIC | Age: 83
DRG: 202 | End: 2023-11-24
Attending: INTERNAL MEDICINE
Payer: COMMERCIAL

## 2023-11-24 LAB
ANION GAP SERPL CALCULATED.3IONS-SCNC: 6 MMOL/L (ref 7–15)
BASOPHILS # BLD AUTO: 0 10E3/UL (ref 0–0.2)
BASOPHILS NFR BLD AUTO: 1 %
BUN SERPL-MCNC: 15.2 MG/DL (ref 8–23)
CALCIUM SERPL-MCNC: 8.4 MG/DL (ref 8.8–10.2)
CHLORIDE SERPL-SCNC: 102 MMOL/L (ref 98–107)
CREAT SERPL-MCNC: 0.71 MG/DL (ref 0.51–0.95)
CREAT SERPL-MCNC: 0.8 MG/DL (ref 0.51–0.95)
DEPRECATED HCO3 PLAS-SCNC: 29 MMOL/L (ref 22–29)
EGFRCR SERPLBLD CKD-EPI 2021: 73 ML/MIN/1.73M2
EGFRCR SERPLBLD CKD-EPI 2021: 84 ML/MIN/1.73M2
EOSINOPHIL # BLD AUTO: 0.1 10E3/UL (ref 0–0.7)
EOSINOPHIL NFR BLD AUTO: 1 %
ERYTHROCYTE [DISTWIDTH] IN BLOOD BY AUTOMATED COUNT: 13.4 % (ref 10–15)
GLUCOSE SERPL-MCNC: 103 MG/DL (ref 70–99)
HCT VFR BLD AUTO: 39.5 % (ref 35–47)
HGB BLD-MCNC: 12.7 G/DL (ref 11.7–15.7)
IMM GRANULOCYTES # BLD: 0.1 10E3/UL
IMM GRANULOCYTES NFR BLD: 1 %
LYMPHOCYTES # BLD AUTO: 1.2 10E3/UL (ref 0.8–5.3)
LYMPHOCYTES NFR BLD AUTO: 14 %
MCH RBC QN AUTO: 30.1 PG (ref 26.5–33)
MCHC RBC AUTO-ENTMCNC: 32.2 G/DL (ref 31.5–36.5)
MCV RBC AUTO: 94 FL (ref 78–100)
MONOCYTES # BLD AUTO: 1 10E3/UL (ref 0–1.3)
MONOCYTES NFR BLD AUTO: 12 %
NEUTROPHILS # BLD AUTO: 6.1 10E3/UL (ref 1.6–8.3)
NEUTROPHILS NFR BLD AUTO: 71 %
NRBC # BLD AUTO: 0 10E3/UL
NRBC BLD AUTO-RTO: 0 /100
PLATELET # BLD AUTO: 163 10E3/UL (ref 150–450)
POTASSIUM SERPL-SCNC: 4.4 MMOL/L (ref 3.4–5.3)
RBC # BLD AUTO: 4.22 10E6/UL (ref 3.8–5.2)
SODIUM SERPL-SCNC: 137 MMOL/L (ref 135–145)
WBC # BLD AUTO: 8.4 10E3/UL (ref 4–11)

## 2023-11-24 PROCEDURE — 85025 COMPLETE CBC W/AUTO DIFF WBC: CPT | Performed by: INTERNAL MEDICINE

## 2023-11-24 PROCEDURE — 36415 COLL VENOUS BLD VENIPUNCTURE: CPT | Performed by: INTERNAL MEDICINE

## 2023-11-24 PROCEDURE — 82565 ASSAY OF CREATININE: CPT | Performed by: INTERNAL MEDICINE

## 2023-11-24 PROCEDURE — 80048 BASIC METABOLIC PNL TOTAL CA: CPT | Performed by: INTERNAL MEDICINE

## 2023-11-24 PROCEDURE — 250N000013 HC RX MED GY IP 250 OP 250 PS 637: Performed by: INTERNAL MEDICINE

## 2023-11-24 PROCEDURE — 250N000012 HC RX MED GY IP 250 OP 636 PS 637: Performed by: INTERNAL MEDICINE

## 2023-11-24 PROCEDURE — 94640 AIRWAY INHALATION TREATMENT: CPT | Mod: 76

## 2023-11-24 PROCEDURE — 94640 AIRWAY INHALATION TREATMENT: CPT

## 2023-11-24 PROCEDURE — 999N000157 HC STATISTIC RCP TIME EA 10 MIN

## 2023-11-24 PROCEDURE — 99232 SBSQ HOSP IP/OBS MODERATE 35: CPT | Performed by: INTERNAL MEDICINE

## 2023-11-24 PROCEDURE — 71045 X-RAY EXAM CHEST 1 VIEW: CPT

## 2023-11-24 PROCEDURE — 250N000009 HC RX 250: Performed by: INTERNAL MEDICINE

## 2023-11-24 PROCEDURE — 120N000001 HC R&B MED SURG/OB

## 2023-11-24 RX ORDER — METHYLPREDNISOLONE SODIUM SUCCINATE 125 MG/2ML
125 INJECTION, POWDER, LYOPHILIZED, FOR SOLUTION INTRAMUSCULAR; INTRAVENOUS EVERY 24 HOURS
Qty: 6 ML | Refills: 0 | Status: DISCONTINUED | OUTPATIENT
Start: 2023-11-25 | End: 2023-11-26

## 2023-11-24 RX ORDER — ENOXAPARIN SODIUM 100 MG/ML
40 INJECTION SUBCUTANEOUS EVERY 24 HOURS
Status: DISCONTINUED | OUTPATIENT
Start: 2023-11-25 | End: 2023-11-28 | Stop reason: HOSPADM

## 2023-11-24 RX ADMIN — IPRATROPIUM BROMIDE AND ALBUTEROL SULFATE 3 ML: .5; 3 SOLUTION RESPIRATORY (INHALATION) at 15:48

## 2023-11-24 RX ADMIN — FEXOFENADINE HYDROCHLORIDE 60 MG: 60 TABLET ORAL at 08:46

## 2023-11-24 RX ADMIN — PREDNISONE 40 MG: 20 TABLET ORAL at 08:46

## 2023-11-24 RX ADMIN — BENZONATATE 100 MG: 100 CAPSULE ORAL at 16:39

## 2023-11-24 RX ADMIN — BENZONATATE 100 MG: 100 CAPSULE ORAL at 23:32

## 2023-11-24 RX ADMIN — IPRATROPIUM BROMIDE AND ALBUTEROL SULFATE 3 ML: .5; 3 SOLUTION RESPIRATORY (INHALATION) at 08:36

## 2023-11-24 RX ADMIN — CITALOPRAM HYDROBROMIDE 40 MG: 20 TABLET, FILM COATED ORAL at 23:32

## 2023-11-24 RX ADMIN — IPRATROPIUM BROMIDE AND ALBUTEROL SULFATE 3 ML: .5; 3 SOLUTION RESPIRATORY (INHALATION) at 11:37

## 2023-11-24 RX ADMIN — BENZONATATE 100 MG: 100 CAPSULE ORAL at 08:46

## 2023-11-24 RX ADMIN — SENNOSIDES AND DOCUSATE SODIUM 1 TABLET: 8.6; 5 TABLET ORAL at 08:46

## 2023-11-24 RX ADMIN — FEXOFENADINE HYDROCHLORIDE 60 MG: 60 TABLET ORAL at 20:19

## 2023-11-24 RX ADMIN — IPRATROPIUM BROMIDE AND ALBUTEROL SULFATE 3 ML: .5; 3 SOLUTION RESPIRATORY (INHALATION) at 19:57

## 2023-11-24 RX ADMIN — SENNOSIDES AND DOCUSATE SODIUM 1 TABLET: 8.6; 5 TABLET ORAL at 20:18

## 2023-11-24 ASSESSMENT — ACTIVITIES OF DAILY LIVING (ADL)
ADLS_ACUITY_SCORE: 48

## 2023-11-24 NOTE — PLAN OF CARE
Goal Outcome Evaluation:       Pertinent assessments: Patient alert to self only and Confused. VSS, on 4L NC. LS whizzing and Dyspnea on exertion. Frequent cough. Schedule tessalon and PRN robitussin given. Incontinent due to frequent coughing, using purewick. Denies pain and nausea.     Major Shift Events: frequently getting out of bed and chair, bedside attendant in placed.     Treatment Plan: Prednisone, nebs, tessalon. Monitor respiratory status. Supplemental oxygen.     Bedside Nurse: Sara Quintero RN

## 2023-11-24 NOTE — PLAN OF CARE
RN - [id number] - VSS. Disoriented to time and situation when prompted. Noticeably sleeping upon assessment. Maintaining adequate O2 saturation on 2L nasal canula. Lung sounds wheezy and congested. Up with 1 and gait belt walker. Tolerating PO inatke. Plan to continue ongoing treatment of respiratory symptoms - starting solumedrol tonight in replace of previous prednisone. hopeful for eventual discharge back home with grand daughter.      Plan of Care Reviewed With: patient

## 2023-11-24 NOTE — PLAN OF CARE
Goal Outcome Evaluation:      Plan of Care Reviewed With: patient, child    Overall Patient Progress: no changeOverall Patient Progress: no change    Outcome Evaluation: Weaned oxygen to 2 LPM    End of Shift Summary  For vital signs and complete assessments, please see documentation flowsheets.     Pertinent assessments: Patient alert to self only and Confused. VSS, on 4L NC weaned to 2 LPM. LS exp wheeze and Dyspnea on exertion, infrequent cough. Schedule tessalon. Incontinent due to frequent coughing, using purewick. Denies pain and nausea. Ambulated to the bathroom multiple times this shift, maintained oxygen saturations    Major Shift Events: Reports very sleepy this AM, having difficulty maintaining conversation. Held scheduled Ativan due to lethargy. Weaned oxygen to 2LPM this shift.     Treatment Plan: Prednisone, nebs, tessalon. Monitor respiratory status. Supplemental oxygen.     Bedside Nurse: Nelsy Grier RN

## 2023-11-24 NOTE — PROGRESS NOTES
[facility]  Hospitalist Progress Note  Bonnie Chow MD     Reason for Stay (Diagnosis): RSV, reactive airway disease exacerbation         Assessment and Plan:      Summary of Stay: Keisha Kolb is a 83 year old female with history of some degree of dementia who lives at home and is cared for by her granddaughter, HLD, anxiety, and intermittent episodes of shortness of breath and wheezing ever since COVID-19 infection last year without formal diagnosis of asthma or COPD who was admitted on 11/20/2023 after presenting with very frequent nonproductive cough and shortness of breath with wheezing for the last few days.  Patient's granddaughter was ill with a cough for a few days before her symptoms started. Came to the ER due to severe shortness of breath.    In the ER she was afebrile and initially hypertensive.  O2 sats in the low 90s at rest, however when she ambulates they drop to 90% and she looks very short of breath.  She was wheezing significantly in the ER.  CBC and BMP are unremarkable.  Lactic acid not elevated.  Chest x-ray did not show any infiltrate.  RSV PCR is negative which is the etiology for cough resulting in a reactive airway disease exacerbation.  Received IV Solu-Medrol and nebs. Admitted to inpatient. Continue treatment and attempt to wean O2.    Problem List/Assessment and Plan:   RSV infection  Reactive airway disease exacerbation  Acute respiratory failure with hypoxia  No formal diagnosis of asthma or COPD, however has had issues with bronchospasm and wheezing with intermittent shortness of breath since having COVID-19 a year ago.  Presented here with nonproductive frequent cough for the past few days with sick contact with similar symptoms being her granddaughter who cares for her.  For the last couple of days she has had significant shortness of breath and wheezing.  RSV PCR is positive.  COVID-19, influenza A/B PCR negative.  Wheezing and shortness of breath  secondary to reactive airway disease exacerbation.  Chest x-ray does not show any significant infiltrate.    - Currently requiring 4L O2 at rest.   - Given degree of symptoms, obtained repeat CXR 11/24 and no infiltrate noted. Likely all RSV infection with reactive airway response.  - Have continued prednisone 40 mg daily, however, will change to solu-medrol 11/25 morning. Waiting until morning given previous delirium response.   - DuoNebs scheduled 4 times daily and albuterol nebs as needed  - Schedule Tessalon Perles 3 times daily, guaifenesin as needed  - Continuous pulse ox and wean supplemental oxygen as able  - Consider mag infusion tomorrow depending on improvement.   - Resume Flovent when she returns home    Dementia  Granddaughter cares for her at home.    Anxiety:   Continue PTA citalopram and lorazepam     HLD: Can resume simvastatin at home after discharge.    Mild thrombocytopenia: Platelet count slightly lower than baseline at 144 in the ER.  Nothing further to work-up at this time.    DVT Prophylaxis: Lovenox  Code Status: DNR / DNI confirmed with marni Rolon on 11/24/23.   FEN: Regular diet  Discharge Dispo: Home with granddaughter who cares for her  Estimated Disch Date / # of Days until Disch: Anticipate hospitalization at least 2-3 more days based on continued oxygen requirements and degree of wheezing.      Clinically Significant Risk Factors                                                Interval History (Subjective):      Somnolent this morning. Less interactive. Continues to have requirement for 4L O2. Increased cough, significant wheezing. Marni Rolon at bedside. Confirmed DNR / DNI code status.   CXR obtained and no evidence of secondary pneumonia. Requested increased neb from RT. Planning to increase and change to IV steroid tomorrow morning (not this evening given previous worsening delirium).   Continue supportive treatment.                     Physical Exam:      Last Vital Signs:  BP (!)  151/73 (BP Location: Left arm)   Pulse 74   Temp 98.2  F (36.8  C) (Oral)   Resp 20   SpO2 96%     Constitutional: Pleasant older woman resting in bed. Less interactive today. Son Gonzales in room. Patient is requiring 4L. Increased work of breathing.   HEENT: NCAT. EOMI. Dry oral mucosa.  Respiratory: Frequent cough. On 4 L O2. Extensive wheezes on exam.   Cardiovascular: Regular rate and rhythm. No murmur.  GI: Soft, nontender, nondistended. Normoactive bowel sounds.   Musculoskeletal: No gross deformities.   Neurologic: Alert but not oriented at time of exam. Known dementia. No focal neurologic deficits. Did not assess gait.         Medications:      All current medications were reviewed with changes reflected in problem list.         Data:      All new lab and imaging data were personally reviewed.   Labs:  Recent Labs   Lab 11/24/23  0637 11/23/23  0652 11/21/23  0807 11/20/23 2008    139  --  137   POTASSIUM 4.4 4.3  --  3.6   CHLORIDE 102 103  --  100   CO2 29 27  --  27   ANIONGAP 6* 9  --  10   * 112* 233* 109*   BUN 15.2 18.4  --  9.2   CR 0.80 0.89  --  0.83   GFRESTIMATED 73 64  --  70   KRISTEL 8.4* 8.4*  --  8.9     Recent Labs   Lab 11/24/23  0637 11/23/23  0652 11/20/23 2008   WBC 8.4 11.9* 8.2   HGB 12.7 13.1 14.1   HCT 39.5 41.0 43.0   MCV 94 94 93    156 144*   RSV PCR positive      Bonnie Chow MD

## 2023-11-24 NOTE — PLAN OF CARE
Goal Outcome Evaluation:      Plan of Care Reviewed With: patient    Overall Patient Progress: improvingOverall Patient Progress: improving  To Do:  End of Shift Summary  For vital signs and complete assessments, please see documentation flowsheets.     Pertinent assessments: [facility] cares [id number]. Patient alert to self only and Confused. VSS, on 4L NC. LS whizzing and Dyspnea on exertion. Frequent cough. Schedule tessalon and PRN robitussin given. Incontinent due to frequent coughing, using purewick. Denies pain and nausea.     Major Shift Events: frequently getting out of bed and chair, bedside attendant in placed.     Treatment Plan: Prednisone, nebs, tessalon. Monitor respiratory status. Supplemental oxygen.     Bedside Nurse: Cliff Carrero RN

## 2023-11-25 LAB
ANION GAP SERPL CALCULATED.3IONS-SCNC: 8 MMOL/L (ref 7–15)
BASOPHILS # BLD AUTO: 0 10E3/UL (ref 0–0.2)
BASOPHILS NFR BLD AUTO: 0 %
BUN SERPL-MCNC: 14.6 MG/DL (ref 8–23)
CALCIUM SERPL-MCNC: 8.7 MG/DL (ref 8.8–10.2)
CHLORIDE SERPL-SCNC: 100 MMOL/L (ref 98–107)
CREAT SERPL-MCNC: 0.84 MG/DL (ref 0.51–0.95)
DEPRECATED HCO3 PLAS-SCNC: 31 MMOL/L (ref 22–29)
EGFRCR SERPLBLD CKD-EPI 2021: 69 ML/MIN/1.73M2
EOSINOPHIL # BLD AUTO: 0.2 10E3/UL (ref 0–0.7)
EOSINOPHIL NFR BLD AUTO: 2 %
ERYTHROCYTE [DISTWIDTH] IN BLOOD BY AUTOMATED COUNT: 13.2 % (ref 10–15)
GLUCOSE SERPL-MCNC: 100 MG/DL (ref 70–99)
HCT VFR BLD AUTO: 40.9 % (ref 35–47)
HGB BLD-MCNC: 12.9 G/DL (ref 11.7–15.7)
IMM GRANULOCYTES # BLD: 0.1 10E3/UL
IMM GRANULOCYTES NFR BLD: 1 %
LYMPHOCYTES # BLD AUTO: 1.8 10E3/UL (ref 0.8–5.3)
LYMPHOCYTES NFR BLD AUTO: 17 %
MCH RBC QN AUTO: 29.6 PG (ref 26.5–33)
MCHC RBC AUTO-ENTMCNC: 31.5 G/DL (ref 31.5–36.5)
MCV RBC AUTO: 94 FL (ref 78–100)
MONOCYTES # BLD AUTO: 1.1 10E3/UL (ref 0–1.3)
MONOCYTES NFR BLD AUTO: 11 %
NEUTROPHILS # BLD AUTO: 7.4 10E3/UL (ref 1.6–8.3)
NEUTROPHILS NFR BLD AUTO: 69 %
NRBC # BLD AUTO: 0 10E3/UL
NRBC BLD AUTO-RTO: 0 /100
PLATELET # BLD AUTO: 149 10E3/UL (ref 150–450)
POTASSIUM SERPL-SCNC: 4.7 MMOL/L (ref 3.4–5.3)
RBC # BLD AUTO: 4.36 10E6/UL (ref 3.8–5.2)
SODIUM SERPL-SCNC: 139 MMOL/L (ref 135–145)
WBC # BLD AUTO: 10.6 10E3/UL (ref 4–11)

## 2023-11-25 PROCEDURE — 120N000001 HC R&B MED SURG/OB

## 2023-11-25 PROCEDURE — 94640 AIRWAY INHALATION TREATMENT: CPT

## 2023-11-25 PROCEDURE — 99232 SBSQ HOSP IP/OBS MODERATE 35: CPT | Performed by: INTERNAL MEDICINE

## 2023-11-25 PROCEDURE — 250N000009 HC RX 250: Performed by: INTERNAL MEDICINE

## 2023-11-25 PROCEDURE — 250N000013 HC RX MED GY IP 250 OP 250 PS 637: Performed by: INTERNAL MEDICINE

## 2023-11-25 PROCEDURE — 94640 AIRWAY INHALATION TREATMENT: CPT | Mod: 76

## 2023-11-25 PROCEDURE — 999N000157 HC STATISTIC RCP TIME EA 10 MIN

## 2023-11-25 PROCEDURE — 80048 BASIC METABOLIC PNL TOTAL CA: CPT | Performed by: INTERNAL MEDICINE

## 2023-11-25 PROCEDURE — 85025 COMPLETE CBC W/AUTO DIFF WBC: CPT | Performed by: INTERNAL MEDICINE

## 2023-11-25 PROCEDURE — 250N000011 HC RX IP 250 OP 636: Mod: JZ | Performed by: INTERNAL MEDICINE

## 2023-11-25 PROCEDURE — 36415 COLL VENOUS BLD VENIPUNCTURE: CPT | Performed by: INTERNAL MEDICINE

## 2023-11-25 RX ORDER — LIDOCAINE 40 MG/G
CREAM TOPICAL
Status: DISCONTINUED | OUTPATIENT
Start: 2023-11-25 | End: 2023-11-28 | Stop reason: HOSPADM

## 2023-11-25 RX ORDER — MAGNESIUM SULFATE HEPTAHYDRATE 40 MG/ML
2 INJECTION, SOLUTION INTRAVENOUS ONCE
Status: COMPLETED | OUTPATIENT
Start: 2023-11-25 | End: 2023-11-25

## 2023-11-25 RX ADMIN — IPRATROPIUM BROMIDE AND ALBUTEROL SULFATE 3 ML: .5; 3 SOLUTION RESPIRATORY (INHALATION) at 19:46

## 2023-11-25 RX ADMIN — FEXOFENADINE HYDROCHLORIDE 60 MG: 60 TABLET ORAL at 09:35

## 2023-11-25 RX ADMIN — BENZONATATE 100 MG: 100 CAPSULE ORAL at 17:33

## 2023-11-25 RX ADMIN — ENOXAPARIN SODIUM 40 MG: 40 INJECTION SUBCUTANEOUS at 09:36

## 2023-11-25 RX ADMIN — GUAIFENESIN 200 MG: 200 SOLUTION ORAL at 09:35

## 2023-11-25 RX ADMIN — IPRATROPIUM BROMIDE AND ALBUTEROL SULFATE 3 ML: .5; 3 SOLUTION RESPIRATORY (INHALATION) at 15:40

## 2023-11-25 RX ADMIN — FEXOFENADINE HYDROCHLORIDE 60 MG: 60 TABLET ORAL at 21:29

## 2023-11-25 RX ADMIN — BENZONATATE 100 MG: 100 CAPSULE ORAL at 21:24

## 2023-11-25 RX ADMIN — SENNOSIDES AND DOCUSATE SODIUM 1 TABLET: 8.6; 5 TABLET ORAL at 21:29

## 2023-11-25 RX ADMIN — CITALOPRAM HYDROBROMIDE 40 MG: 20 TABLET, FILM COATED ORAL at 21:24

## 2023-11-25 RX ADMIN — SENNOSIDES AND DOCUSATE SODIUM 1 TABLET: 8.6; 5 TABLET ORAL at 09:35

## 2023-11-25 RX ADMIN — BENZONATATE 100 MG: 100 CAPSULE ORAL at 09:35

## 2023-11-25 RX ADMIN — GUAIFENESIN 200 MG: 200 SOLUTION ORAL at 17:33

## 2023-11-25 RX ADMIN — IPRATROPIUM BROMIDE AND ALBUTEROL SULFATE 3 ML: .5; 3 SOLUTION RESPIRATORY (INHALATION) at 12:29

## 2023-11-25 RX ADMIN — IPRATROPIUM BROMIDE AND ALBUTEROL SULFATE 3 ML: .5; 3 SOLUTION RESPIRATORY (INHALATION) at 08:55

## 2023-11-25 RX ADMIN — MAGNESIUM SULFATE HEPTAHYDRATE 2 G: 2 INJECTION, SOLUTION INTRAVENOUS at 11:16

## 2023-11-25 RX ADMIN — METHYLPREDNISOLONE SODIUM SUCCINATE 125 MG: 125 INJECTION, POWDER, FOR SOLUTION INTRAMUSCULAR; INTRAVENOUS at 09:39

## 2023-11-25 ASSESSMENT — ACTIVITIES OF DAILY LIVING (ADL)
ADLS_ACUITY_SCORE: 47
ADLS_ACUITY_SCORE: 48
ADLS_ACUITY_SCORE: 47
ADLS_ACUITY_SCORE: 48
ADLS_ACUITY_SCORE: 47
ADLS_ACUITY_SCORE: 48
ADLS_ACUITY_SCORE: 47

## 2023-11-25 NOTE — PLAN OF CARE
"  Plan of care reviewed with: Patient   Assumed care of patient around 7 pm   Isolation:Contact and droplet precautions maintained  Orientation: Alert, disoriented to time, intermittent confusion  Pain:Denies pain   Diet:Reg diet, denies N/V  Activity:Ax1 with walker, not OOB during shift   LDA's: PIV flushed/SL  GI/:No BM during shift, incontinent at times, PW in place, adequate output   Resp:2L NC, expiratory wheezes on auscultation , denies SOB, infrequent non-productive cough, declined PRN Robitussin  Current plan of care: Monitor Resp, anticipating discharge home with grand daughter when medically stable    BP (!) 158/76 (BP Location: Left arm)   Pulse 71   Temp 97.8  F (36.6  C) (Oral)   Resp 18   Ht 1.613 m (5' 3.5\")   Wt 78.6 kg (173 lb 4.5 oz)   SpO2 93%   BMI 30.21 kg/m     "

## 2023-11-25 NOTE — PROGRESS NOTES
[facility]  Hospitalist Progress Note  Bonnie Chow MD     Reason for Stay (Diagnosis): RSV, reactive airway disease exacerbation         Assessment and Plan:      Summary of Stay: Keisha Kolb is a 83 year old female with history of some degree of dementia who lives at home and is cared for by her granddaughter, HLD, anxiety, and intermittent episodes of shortness of breath and wheezing ever since COVID-19 infection last year without formal diagnosis of asthma or COPD who was admitted on 11/20/2023 after presenting with very frequent nonproductive cough and shortness of breath with wheezing for the last few days.  Patient's granddaughter was ill with a cough for a few days before her symptoms started. Came to the ER due to severe shortness of breath.    In the ER she was afebrile and initially hypertensive.  O2 sats in the low 90s at rest, however when she ambulates they drop to 90% and she looks very short of breath.  She was wheezing significantly in the ER.  CBC and BMP are unremarkable.  Lactic acid not elevated.  Chest x-ray did not show any infiltrate.  RSV PCR is negative which is the etiology for cough resulting in a reactive airway disease exacerbation.  Received IV Solu-Medrol and nebs. Admitted to inpatient. Continue treatment and attempt to wean O2.    Problem List/Assessment and Plan:   RSV infection  Reactive airway disease exacerbation  Acute respiratory failure with hypoxia  No formal diagnosis of asthma or COPD, however has had issues with bronchospasm and wheezing with intermittent shortness of breath since having COVID-19 a year ago.  Presented here with nonproductive frequent cough for the past few days with sick contact with similar symptoms being her granddaughter who cares for her.  For the last couple of days she has had significant shortness of breath and wheezing.  RSV PCR is positive.  COVID-19, influenza A/B PCR negative.  Wheezing and shortness of breath  "secondary to reactive airway disease exacerbation.  Chest x-ray does not show any significant infiltrate.    - Currently requiring 4L O2 at rest.   - Given degree of symptoms, obtained repeat CXR 11/24 and no infiltrate noted. Likely all RSV infection with reactive airway response.  - Had continued prednisone 40 mg daily, however, changed to solu-medrol 11/25 morning.  - IV mag 2 g given 11/25.   - DuoNebs scheduled 4 times daily and albuterol nebs as needed  - Schedule Tessalon Perles 3 times daily, guaifenesin as needed  - Continuous pulse ox and wean supplemental oxygen as able  - Resume Flovent when she returns home    Dementia  Granddaughter cares for her at home.    Anxiety:   Continue PTA citalopram and lorazepam     HLD: Can resume simvastatin at home after discharge.    Mild thrombocytopenia: Platelet count slightly lower than baseline at 144 in the ER.  Nothing further to work-up at this time.    DVT Prophylaxis: Lovenox  Code Status: DNR / DNI confirmed with marni Rolon on 11/24/23.   FEN: Regular diet  Discharge Dispo: Home with granddaughter who cares for her  Estimated Disch Date / # of Days until Disch: Anticipate hospitalization at least 2 more days based on continued oxygen requirements and degree of wheezing.      Clinically Significant Risk Factors                         # Obesity: Estimated body mass index is 30.21 kg/m  as calculated from the following:    Height as of this encounter: 1.613 m (5' 3.5\").    Weight as of this encounter: 78.6 kg (173 lb 4.5 oz)., PRESENT ON ADMISSION                        Interval History (Subjective):      Alert. Oriented to person and that she is in the hospital. Conversant. Reports not feeling well, but better than yesterday. Requiring 2 L O2. Occasional cough. On IV solu-medrol today. IV mag ordered. Marni Rolon updated by phone. Otherwise no acute events or new complaints.                     Physical Exam:      Last Vital Signs:  BP (!) 155/66 (BP Location: Left " "arm)   Pulse 78   Temp (!) 96.4  F (35.8  C) (Oral)   Resp 18   Ht 1.613 m (5' 3.5\")   Wt 78.6 kg (173 lb 4.5 oz)   SpO2 93%   BMI 30.21 kg/m      Constitutional: Pleasant older woman resting in bed. Appears ill, toxic. Alert. Oriented to person and that she is in the hospital. Conversant. Requiring 2 L O2. Continues to have occasional cough and increased work of breathing.   HEENT: NCAT. EOMI. Dry oral mucosa.  Respiratory: Occasional cough. On 2 L O2. Continues to have extensive wheezes on exam.   Cardiovascular: Regular rate and rhythm. No murmur.  GI: Soft, nontender, nondistended. Normoactive bowel sounds.   Musculoskeletal: No gross deformities.   Neurologic: Alert. Orientation as above. Known dementia. No focal neurologic deficits. Did not assess gait.         Medications:      All current medications were reviewed with changes reflected in problem list.         Data:      All new lab and imaging data were personally reviewed.   Labs:  Recent Labs   Lab 11/25/23  0655 11/24/23  1759 11/24/23  0637 11/23/23  0652     --  137 139   POTASSIUM 4.7  --  4.4 4.3   CHLORIDE 100  --  102 103   CO2 31*  --  29 27   ANIONGAP 8  --  6* 9   *  --  103* 112*   BUN 14.6  --  15.2 18.4   CR 0.84 0.71 0.80 0.89   GFRESTIMATED 69 84 73 64   KRISTEL 8.7*  --  8.4* 8.4*     Recent Labs   Lab 11/25/23  0655 11/24/23  0637 11/23/23  0652   WBC 10.6 8.4 11.9*   HGB 12.9 12.7 13.1   HCT 40.9 39.5 41.0   MCV 94 94 94   * 163 156   RSV PCR positive      Bonnie Chow MD    "

## 2023-11-25 NOTE — PROGRESS NOTES
"[facility]     Date: 11/25/23  Admission Dx: RSV+, RAD  Pulmonary History RAD  Home Nebulizer/MDI Use: albuterol MDI prn   Home Oxygen: N/A  Acuity Level ([facility] flow sheet): 3  Aerosol Therapy initiated: Duoneb QID, Albuterol Q2 prn      Pulmonary Hygiene initiated: deep breath and cough       Volume Expansion initiated: IS       Current Oxygen Requirements: 2 lpm NC  Current SpO2: 94    Re-evaluation date: 12/2/2023    Patient Education: indications and benefits of bronchodilators.       See \"RT Assessments\" flow sheet for patient assessment scoring and Acuity Level Details.           "

## 2023-11-26 LAB
ANION GAP SERPL CALCULATED.3IONS-SCNC: 7 MMOL/L (ref 7–15)
BASOPHILS # BLD AUTO: ABNORMAL 10*3/UL
BASOPHILS # BLD MANUAL: 0 10E3/UL (ref 0–0.2)
BASOPHILS NFR BLD AUTO: ABNORMAL %
BASOPHILS NFR BLD MANUAL: 0 %
BUN SERPL-MCNC: 18.4 MG/DL (ref 8–23)
CALCIUM SERPL-MCNC: 8.6 MG/DL (ref 8.8–10.2)
CHLORIDE SERPL-SCNC: 103 MMOL/L (ref 98–107)
CREAT SERPL-MCNC: 0.78 MG/DL (ref 0.51–0.95)
DEPRECATED HCO3 PLAS-SCNC: 30 MMOL/L (ref 22–29)
EGFRCR SERPLBLD CKD-EPI 2021: 75 ML/MIN/1.73M2
EOSINOPHIL # BLD AUTO: ABNORMAL 10*3/UL
EOSINOPHIL # BLD MANUAL: 0 10E3/UL (ref 0–0.7)
EOSINOPHIL NFR BLD AUTO: ABNORMAL %
EOSINOPHIL NFR BLD MANUAL: 0 %
ERYTHROCYTE [DISTWIDTH] IN BLOOD BY AUTOMATED COUNT: 12.9 % (ref 10–15)
GLUCOSE SERPL-MCNC: 132 MG/DL (ref 70–99)
HCT VFR BLD AUTO: 42.7 % (ref 35–47)
HGB BLD-MCNC: 13.7 G/DL (ref 11.7–15.7)
IMM GRANULOCYTES # BLD: ABNORMAL 10*3/UL
IMM GRANULOCYTES NFR BLD: ABNORMAL %
LYMPHOCYTES # BLD AUTO: ABNORMAL 10*3/UL
LYMPHOCYTES # BLD MANUAL: 1 10E3/UL (ref 0.8–5.3)
LYMPHOCYTES NFR BLD AUTO: ABNORMAL %
LYMPHOCYTES NFR BLD MANUAL: 8 %
MCH RBC QN AUTO: 29.6 PG (ref 26.5–33)
MCHC RBC AUTO-ENTMCNC: 32.1 G/DL (ref 31.5–36.5)
MCV RBC AUTO: 92 FL (ref 78–100)
MONOCYTES # BLD AUTO: ABNORMAL 10*3/UL
MONOCYTES # BLD MANUAL: 0.9 10E3/UL (ref 0–1.3)
MONOCYTES NFR BLD AUTO: ABNORMAL %
MONOCYTES NFR BLD MANUAL: 7 %
NEUTROPHILS # BLD AUTO: ABNORMAL 10*3/UL
NEUTROPHILS # BLD MANUAL: 11.1 10E3/UL (ref 1.6–8.3)
NEUTROPHILS NFR BLD AUTO: ABNORMAL %
NEUTROPHILS NFR BLD MANUAL: 85 %
NRBC # BLD AUTO: 0 10E3/UL
NRBC BLD AUTO-RTO: 0 /100
PLAT MORPH BLD: ABNORMAL
PLATELET # BLD AUTO: 193 10E3/UL (ref 150–450)
POTASSIUM SERPL-SCNC: 4.6 MMOL/L (ref 3.4–5.3)
RBC # BLD AUTO: 4.63 10E6/UL (ref 3.8–5.2)
RBC MORPH BLD: ABNORMAL
SODIUM SERPL-SCNC: 140 MMOL/L (ref 135–145)
WBC # BLD AUTO: 13 10E3/UL (ref 4–11)

## 2023-11-26 PROCEDURE — 99232 SBSQ HOSP IP/OBS MODERATE 35: CPT | Performed by: INTERNAL MEDICINE

## 2023-11-26 PROCEDURE — 250N000013 HC RX MED GY IP 250 OP 250 PS 637: Performed by: INTERNAL MEDICINE

## 2023-11-26 PROCEDURE — 36415 COLL VENOUS BLD VENIPUNCTURE: CPT | Performed by: INTERNAL MEDICINE

## 2023-11-26 PROCEDURE — 94640 AIRWAY INHALATION TREATMENT: CPT | Mod: 76

## 2023-11-26 PROCEDURE — 250N000009 HC RX 250: Performed by: INTERNAL MEDICINE

## 2023-11-26 PROCEDURE — 85007 BL SMEAR W/DIFF WBC COUNT: CPT | Performed by: INTERNAL MEDICINE

## 2023-11-26 PROCEDURE — 120N000001 HC R&B MED SURG/OB

## 2023-11-26 PROCEDURE — 82310 ASSAY OF CALCIUM: CPT | Performed by: INTERNAL MEDICINE

## 2023-11-26 PROCEDURE — 999N000157 HC STATISTIC RCP TIME EA 10 MIN

## 2023-11-26 PROCEDURE — 250N000011 HC RX IP 250 OP 636: Performed by: INTERNAL MEDICINE

## 2023-11-26 PROCEDURE — 85027 COMPLETE CBC AUTOMATED: CPT | Performed by: INTERNAL MEDICINE

## 2023-11-26 PROCEDURE — 94640 AIRWAY INHALATION TREATMENT: CPT

## 2023-11-26 RX ADMIN — ENOXAPARIN SODIUM 40 MG: 40 INJECTION SUBCUTANEOUS at 09:40

## 2023-11-26 RX ADMIN — IPRATROPIUM BROMIDE AND ALBUTEROL SULFATE 3 ML: .5; 3 SOLUTION RESPIRATORY (INHALATION) at 15:50

## 2023-11-26 RX ADMIN — SENNOSIDES AND DOCUSATE SODIUM 1 TABLET: 8.6; 5 TABLET ORAL at 21:01

## 2023-11-26 RX ADMIN — IPRATROPIUM BROMIDE AND ALBUTEROL SULFATE 3 ML: .5; 3 SOLUTION RESPIRATORY (INHALATION) at 11:18

## 2023-11-26 RX ADMIN — IPRATROPIUM BROMIDE AND ALBUTEROL SULFATE 3 ML: .5; 3 SOLUTION RESPIRATORY (INHALATION) at 20:01

## 2023-11-26 RX ADMIN — FEXOFENADINE HYDROCHLORIDE 60 MG: 60 TABLET ORAL at 09:40

## 2023-11-26 RX ADMIN — METHYLPREDNISOLONE SODIUM SUCCINATE 125 MG: 125 INJECTION, POWDER, FOR SOLUTION INTRAMUSCULAR; INTRAVENOUS at 09:39

## 2023-11-26 RX ADMIN — SENNOSIDES AND DOCUSATE SODIUM 1 TABLET: 8.6; 5 TABLET ORAL at 09:40

## 2023-11-26 RX ADMIN — CITALOPRAM HYDROBROMIDE 40 MG: 20 TABLET, FILM COATED ORAL at 23:46

## 2023-11-26 RX ADMIN — FEXOFENADINE HYDROCHLORIDE 60 MG: 60 TABLET ORAL at 21:01

## 2023-11-26 RX ADMIN — GUAIFENESIN 200 MG: 200 SOLUTION ORAL at 09:39

## 2023-11-26 RX ADMIN — GUAIFENESIN 200 MG: 200 SOLUTION ORAL at 17:14

## 2023-11-26 RX ADMIN — IPRATROPIUM BROMIDE AND ALBUTEROL SULFATE 3 ML: .5; 3 SOLUTION RESPIRATORY (INHALATION) at 07:30

## 2023-11-26 RX ADMIN — BENZONATATE 100 MG: 100 CAPSULE ORAL at 17:14

## 2023-11-26 RX ADMIN — BENZONATATE 100 MG: 100 CAPSULE ORAL at 23:46

## 2023-11-26 RX ADMIN — BENZONATATE 100 MG: 100 CAPSULE ORAL at 09:40

## 2023-11-26 ASSESSMENT — ACTIVITIES OF DAILY LIVING (ADL)
ADLS_ACUITY_SCORE: 47

## 2023-11-26 NOTE — PLAN OF CARE
Goal Outcome Evaluation:       End of Shift Summary  For vital signs and complete assessments, please see documentation flowsheets.     Pertinent assessments: A&O with intermittent confusion.. VSS, weaned off O2 on RA 90-92%  LS, coarse with exp wheeze and Dyspnea on exertion, infrequent cough. Schedule tessalon given. Denies pain and nausea. Purewick at bedtime. PIV SL.    Treatment Plan: IV solumedrol, nebs, tessalon, PRN robitussin. Monitor respiratory status.

## 2023-11-26 NOTE — PLAN OF CARE
Goal Outcome Evaluation:      Plan of Care Reviewed With: patient    Overall Patient Progress: improvingOverall Patient Progress: improving    Outcome Evaluation: Weaned oxygen to 1 LPM    End of Shift Summary  For vital signs and complete assessments, please see documentation flowsheets.     Pertinent assessments: A&O with intermittent confusion.. VSS, weaned off O2 on RA 90-92%  LS, coarse with exp wheeze and Dyspnea on exertion, infrequent cough. Schedule tessalon given. Denies pain and nausea. Purewick at bedtime. PIV SL.  Major shift events: Weaned oxygen to RA    Treatment Plan: IV solumedrol, nebs, tessalon, PRN robitussin. Monitor respiratory status.    Bedside Nurse: Nelsy Grier RN

## 2023-11-26 NOTE — PLAN OF CARE
Goal Outcome Evaluation:      Plan of Care Reviewed With: patient    Overall Patient Progress: improvingOverall Patient Progress: improving    Outcome Evaluation: Weaned oxygen to 1 LPM    End of Shift Summary  For vital signs and complete assessments, please see documentation flowsheets.     Pertinent assessments: Patient alert to self only and Confused. VSS, on 2 LPM NC weaned to 1 LP. LS, coarse with exp wheeze and Dyspnea on exertion, frequent cough. Schedule tessalon and PRN robitussin. Incontinent due to frequent coughing, using purewick.  Ambulated to the bathroom multiple times this shift, maintained oxygen saturations. Denies pain and nausea.    Major Shift Events: More wakeful and interactive today, reports feeling she did not sleep well. Back to IV solumedrol. 1 dose of IV magnesium per MD for respiratory status. Weaned oxygen to 1 LPM.     Treatment Plan: IV solumedrol, nebs, tessalon, PRN robitussin. Monitor respiratory status. Supplemental oxygen, wean as able.      Bedside Nurse: Nelsy Grier RN

## 2023-11-27 ENCOUNTER — APPOINTMENT (OUTPATIENT)
Dept: PHYSICAL THERAPY | Facility: CLINIC | Age: 83
DRG: 202 | End: 2023-11-27
Attending: INTERNAL MEDICINE
Payer: COMMERCIAL

## 2023-11-27 LAB
ANION GAP SERPL CALCULATED.3IONS-SCNC: 9 MMOL/L (ref 7–15)
BASOPHILS # BLD AUTO: ABNORMAL 10*3/UL
BASOPHILS # BLD MANUAL: 0 10E3/UL (ref 0–0.2)
BASOPHILS NFR BLD AUTO: ABNORMAL %
BASOPHILS NFR BLD MANUAL: 0 %
BUN SERPL-MCNC: 21.9 MG/DL (ref 8–23)
CALCIUM SERPL-MCNC: 8.7 MG/DL (ref 8.8–10.2)
CHLORIDE SERPL-SCNC: 99 MMOL/L (ref 98–107)
CREAT SERPL-MCNC: 0.79 MG/DL (ref 0.51–0.95)
DEPRECATED HCO3 PLAS-SCNC: 28 MMOL/L (ref 22–29)
EGFRCR SERPLBLD CKD-EPI 2021: 74 ML/MIN/1.73M2
EOSINOPHIL # BLD AUTO: ABNORMAL 10*3/UL
EOSINOPHIL # BLD MANUAL: 0 10E3/UL (ref 0–0.7)
EOSINOPHIL NFR BLD AUTO: ABNORMAL %
EOSINOPHIL NFR BLD MANUAL: 0 %
ERYTHROCYTE [DISTWIDTH] IN BLOOD BY AUTOMATED COUNT: 12.9 % (ref 10–15)
GLUCOSE SERPL-MCNC: 138 MG/DL (ref 70–99)
HCT VFR BLD AUTO: 43.4 % (ref 35–47)
HGB BLD-MCNC: 14.1 G/DL (ref 11.7–15.7)
IMM GRANULOCYTES # BLD: ABNORMAL 10*3/UL
IMM GRANULOCYTES NFR BLD: ABNORMAL %
LYMPHOCYTES # BLD AUTO: ABNORMAL 10*3/UL
LYMPHOCYTES # BLD MANUAL: 1.6 10E3/UL (ref 0.8–5.3)
LYMPHOCYTES NFR BLD AUTO: ABNORMAL %
LYMPHOCYTES NFR BLD MANUAL: 11 %
MCH RBC QN AUTO: 29.6 PG (ref 26.5–33)
MCHC RBC AUTO-ENTMCNC: 32.5 G/DL (ref 31.5–36.5)
MCV RBC AUTO: 91 FL (ref 78–100)
MONOCYTES # BLD AUTO: ABNORMAL 10*3/UL
MONOCYTES # BLD MANUAL: 0.7 10E3/UL (ref 0–1.3)
MONOCYTES NFR BLD AUTO: ABNORMAL %
MONOCYTES NFR BLD MANUAL: 5 %
NEUTROPHILS # BLD AUTO: ABNORMAL 10*3/UL
NEUTROPHILS # BLD MANUAL: 12 10E3/UL (ref 1.6–8.3)
NEUTROPHILS NFR BLD AUTO: ABNORMAL %
NEUTROPHILS NFR BLD MANUAL: 84 %
NRBC # BLD AUTO: 0 10E3/UL
NRBC BLD AUTO-RTO: 0 /100
PLAT MORPH BLD: ABNORMAL
PLATELET # BLD AUTO: 218 10E3/UL (ref 150–450)
POTASSIUM SERPL-SCNC: 4.3 MMOL/L (ref 3.4–5.3)
RBC # BLD AUTO: 4.76 10E6/UL (ref 3.8–5.2)
RBC MORPH BLD: ABNORMAL
SODIUM SERPL-SCNC: 136 MMOL/L (ref 135–145)
WBC # BLD AUTO: 14.3 10E3/UL (ref 4–11)

## 2023-11-27 PROCEDURE — 97530 THERAPEUTIC ACTIVITIES: CPT | Mod: GP

## 2023-11-27 PROCEDURE — 250N000013 HC RX MED GY IP 250 OP 250 PS 637: Performed by: INTERNAL MEDICINE

## 2023-11-27 PROCEDURE — 80048 BASIC METABOLIC PNL TOTAL CA: CPT | Performed by: INTERNAL MEDICINE

## 2023-11-27 PROCEDURE — 250N000011 HC RX IP 250 OP 636: Mod: JZ | Performed by: INTERNAL MEDICINE

## 2023-11-27 PROCEDURE — 94640 AIRWAY INHALATION TREATMENT: CPT | Mod: 76

## 2023-11-27 PROCEDURE — 36415 COLL VENOUS BLD VENIPUNCTURE: CPT | Performed by: INTERNAL MEDICINE

## 2023-11-27 PROCEDURE — 99232 SBSQ HOSP IP/OBS MODERATE 35: CPT | Performed by: INTERNAL MEDICINE

## 2023-11-27 PROCEDURE — 999N000157 HC STATISTIC RCP TIME EA 10 MIN

## 2023-11-27 PROCEDURE — 250N000012 HC RX MED GY IP 250 OP 636 PS 637: Performed by: INTERNAL MEDICINE

## 2023-11-27 PROCEDURE — 85027 COMPLETE CBC AUTOMATED: CPT | Performed by: INTERNAL MEDICINE

## 2023-11-27 PROCEDURE — 94640 AIRWAY INHALATION TREATMENT: CPT

## 2023-11-27 PROCEDURE — 120N000001 HC R&B MED SURG/OB

## 2023-11-27 PROCEDURE — 85007 BL SMEAR W/DIFF WBC COUNT: CPT | Performed by: INTERNAL MEDICINE

## 2023-11-27 PROCEDURE — 250N000009 HC RX 250: Performed by: INTERNAL MEDICINE

## 2023-11-27 PROCEDURE — 97161 PT EVAL LOW COMPLEX 20 MIN: CPT | Mod: GP

## 2023-11-27 RX ADMIN — IPRATROPIUM BROMIDE AND ALBUTEROL SULFATE 3 ML: .5; 3 SOLUTION RESPIRATORY (INHALATION) at 07:46

## 2023-11-27 RX ADMIN — BENZONATATE 100 MG: 100 CAPSULE ORAL at 21:59

## 2023-11-27 RX ADMIN — FEXOFENADINE HYDROCHLORIDE 60 MG: 60 TABLET ORAL at 19:51

## 2023-11-27 RX ADMIN — ENOXAPARIN SODIUM 40 MG: 40 INJECTION SUBCUTANEOUS at 08:15

## 2023-11-27 RX ADMIN — FEXOFENADINE HYDROCHLORIDE 60 MG: 60 TABLET ORAL at 08:15

## 2023-11-27 RX ADMIN — IPRATROPIUM BROMIDE AND ALBUTEROL SULFATE 3 ML: .5; 3 SOLUTION RESPIRATORY (INHALATION) at 12:12

## 2023-11-27 RX ADMIN — SENNOSIDES AND DOCUSATE SODIUM 2 TABLET: 8.6; 5 TABLET ORAL at 08:16

## 2023-11-27 RX ADMIN — IPRATROPIUM BROMIDE AND ALBUTEROL SULFATE 3 ML: .5; 3 SOLUTION RESPIRATORY (INHALATION) at 19:58

## 2023-11-27 RX ADMIN — CITALOPRAM HYDROBROMIDE 40 MG: 20 TABLET, FILM COATED ORAL at 21:59

## 2023-11-27 RX ADMIN — PREDNISONE 60 MG: 50 TABLET ORAL at 08:16

## 2023-11-27 RX ADMIN — LORAZEPAM 0.5 MG: 0.5 TABLET ORAL at 08:15

## 2023-11-27 RX ADMIN — BENZONATATE 100 MG: 100 CAPSULE ORAL at 08:16

## 2023-11-27 RX ADMIN — BENZONATATE 100 MG: 100 CAPSULE ORAL at 17:05

## 2023-11-27 ASSESSMENT — ACTIVITIES OF DAILY LIVING (ADL)
ADLS_ACUITY_SCORE: 47
ADLS_ACUITY_SCORE: 33
ADLS_ACUITY_SCORE: 32
ADLS_ACUITY_SCORE: 33
ADLS_ACUITY_SCORE: 47
ADLS_ACUITY_SCORE: 33
ADLS_ACUITY_SCORE: 33
ADLS_ACUITY_SCORE: 32
DEPENDENT_IADLS:: LAUNDRY;TRANSPORTATION
ADLS_ACUITY_SCORE: 33
ADLS_ACUITY_SCORE: 32

## 2023-11-27 NOTE — PROGRESS NOTES
[facility]  Hospitalist Progress Note  Bonnie Chow MD     Reason for Stay (Diagnosis): RSV, reactive airway disease exacerbation         Assessment and Plan:      Summary of Stay: Keisha Kolb is a 83 year old female with history of some degree of dementia who lives at home and is cared for by her granddaughter, HLD, anxiety, and intermittent episodes of shortness of breath and wheezing ever since COVID-19 infection last year without formal diagnosis of asthma or COPD who was admitted on 11/20/2023 after presenting with very frequent nonproductive cough and shortness of breath with wheezing for the last few days.  Patient's granddaughter was ill with a cough for a few days before her symptoms started. Came to the ER due to severe shortness of breath.    In the ER she was afebrile and initially hypertensive.  O2 sats in the low 90s at rest, however when she ambulates they drop to 90% and she looks very short of breath.  She was wheezing significantly in the ER.  CBC and BMP are unremarkable.  Lactic acid not elevated.  Chest x-ray did not show any infiltrate.  RSV PCR is negative which is the etiology for cough resulting in a reactive airway disease exacerbation.  Received IV Solu-Medrol and nebs. Admitted to inpatient. Continues to require steroids. Has weaned off O2 on 11/26 while at rest. Continued wheezing. PT is consulted.    Problem List/Assessment and Plan:   RSV infection  Reactive airway disease exacerbation  Acute respiratory failure with hypoxia  No formal diagnosis of asthma or COPD, however has had issues with bronchospasm and wheezing with intermittent shortness of breath since having COVID-19 a year ago.  Presented here with nonproductive frequent cough for the past few days with sick contact with similar symptoms being her granddaughter who cares for her.  For the last couple of days she has had significant shortness of breath and wheezing.  RSV PCR is positive.  COVID-19,  "influenza A/B PCR negative.  Wheezing and shortness of breath secondary to reactive airway disease exacerbation.  Chest x-ray does not show any significant infiltrate.    - Currently requiring 4L O2 at rest.   - Given degree of symptoms, obtained repeat CXR 11/24 and no infiltrate noted. Likely all RSV infection with reactive airway response.  - Had continued prednisone 40 mg daily, however, changed to solu-medrol 11/25 morning.  - IV mag 2 g given 11/25.   - Patient weaned to room air at rest on 11/26. Sats in low 90s and may require O2 with ambulation, but currently deconditioned. Planning to change steroids to prednisone 60 mg daily on 11/27 morning since has shown improvement. Reassess wheezing and O2 requirement. PT is consulted and will need to see if she needs O2 with activity.   - DuoNebs scheduled 4 times daily and albuterol nebs as needed  - Schedule Tessalon Perles 3 times daily, guaifenesin as needed  - Continuous pulse ox.   - Resume Flovent when she returns home    Dementia  Granddaughter cares for her at home.    Anxiety:   Continue PTA citalopram and lorazepam     HLD: Can resume simvastatin at home after discharge.    Mild thrombocytopenia: Platelet count slightly lower than baseline at 144 in the ER.  Nothing further to work-up at this time.    DVT Prophylaxis: Lovenox  Code Status: DNR / DNI confirmed with son Gonzales on 11/24/23.   FEN: Regular diet  Discharge Dispo: Home with granddaughter who cares for her  Estimated Disch Date / # of Days until Disch: Anticipate hospitalization at least 1-2 more days based on need for oxygen and wheezing.       Clinically Significant Risk Factors                         # Obesity: Estimated body mass index is 30.21 kg/m  as calculated from the following:    Height as of this encounter: 1.613 m (5' 3.5\").    Weight as of this encounter: 78.6 kg (173 lb 4.5 oz).                         Interval History (Subjective):      Alert. Oriented to person and that she is in " "the hospital. Conversant, but voice is hoarse today. Able to be weaned to room air while I was in the room with nursing. Continued wheezing.                     Physical Exam:      Last Vital Signs:  BP (!) 154/74 (BP Location: Left arm)   Pulse 73   Temp 98.6  F (37  C) (Axillary)   Resp 16   Ht 1.613 m (5' 3.5\")   Wt 78.6 kg (173 lb 4.5 oz)   SpO2 92%   BMI 30.21 kg/m      Constitutional: Pleasant older woman sitting up in bed. Appears ill. Voice hoarse. Alert and oriented to person, that she is in the hospital. Conversant. Able to be weaned to RA while I was in room with nursing this morning. Cough continues. Slight increased work of breathing, but does appear more comfortable than yesterday.   HEENT: NCAT. EOMI. Dry oral mucosa.  Respiratory: Cough remains. Able to be weaned to room air during exam with nurse in room. Continues to have wheezing, but improved from yesterday.   Cardiovascular: Regular rate and rhythm. No murmur.  GI: Soft, nontender, nondistended.   Musculoskeletal: No gross deformities.   Neurologic: Alert. Orientation as above. Known dementia. No focal neurologic deficits. Did not assess gait.         Medications:      All current medications were reviewed with changes reflected in problem list.         Data:      All new lab and imaging data were personally reviewed.   Labs:  Recent Labs   Lab 11/26/23  0704 11/25/23  0655 11/24/23  1759 11/24/23  0637    139  --  137   POTASSIUM 4.6 4.7  --  4.4   CHLORIDE 103 100  --  102   CO2 30* 31*  --  29   ANIONGAP 7 8  --  6*   * 100*  --  103*   BUN 18.4 14.6  --  15.2   CR 0.78 0.84 0.71 0.80   GFRESTIMATED 75 69 84 73   KRISTEL 8.6* 8.7*  --  8.4*     Recent Labs   Lab 11/26/23  0704 11/25/23  0655 11/24/23  0637   WBC 13.0* 10.6 8.4   HGB 13.7 12.9 12.7   HCT 42.7 40.9 39.5   MCV 92 94 94    149* 163   RSV PCR positive      Bonnie Chow MD    "

## 2023-11-27 NOTE — DISCHARGE INSTRUCTIONS
Your home care referral was sent to [facility] for RN, Physical Therapy and home health aide.   If you haven't heard from them within the next 24-48 hours,  Please call them at [phone]

## 2023-11-27 NOTE — PROGRESS NOTES
[facility]    Medicine Progress Note - Hospitalist Service    Date of Admission:  11/20/2023    Assessment & Plan   Keisha Kolb is a 83 year old female with history of some degree of dementia who lives at home and is cared for by her granddaughter, HLD, anxiety, and intermittent episodes of shortness of breath and wheezing ever since COVID-19 infection last year without formal diagnosis of asthma or COPD who was admitted on 11/20/2023 after presenting with very frequent nonproductive cough and shortness of breath with wheezing for the last few days.  Patient's granddaughter was ill with a cough for a few days before her symptoms started. Came to the ER due to severe shortness of breath.     In the ER she was afebrile and initially hypertensive.  O2 sats in the low 90s at rest, however when she ambulates they drop to 90% and she looks very short of breath.  She was wheezing significantly in the ER.  CBC and BMP are unremarkable.  Lactic acid not elevated.  Chest x-ray did not show any infiltrate.  RSV PCR is negative which is the etiology for cough resulting in a reactive airway disease exacerbation.  Received IV Solu-Medrol and nebs. Admitted to inpatient. Continues to require steroids. Has weaned off O2 on 11/26 while at rest. Continued wheezing. PT is consulted.     Problem List/Assessment and Plan:   RSV infection  Reactive airway disease exacerbation  Acute respiratory failure with hypoxia  No formal diagnosis of asthma or COPD, however has had issues with bronchospasm and wheezing with intermittent shortness of breath since having COVID-19 a year ago.  Presented here with nonproductive frequent cough for the past few days with sick contact with similar symptoms being her granddaughter who cares for her.  For the last couple of days she has had significant shortness of breath and wheezing.  RSV PCR is positive.  COVID-19, influenza A/B PCR negative.  Wheezing and shortness of breath  "secondary to reactive airway disease exacerbation.  Chest x-ray does not show any significant infiltrate.    -Weaned off of supplemental oxygen  - Given degree of symptoms, obtained repeat CXR 11/24 and no infiltrate noted. Likely all RSV infection with reactive airway response.  - Had continued prednisone 40 mg daily, however, changed to solu-medrol 11/25 morning.  - IV mag 2 g given 11/25.   - Patient weaned to room air at rest on 11/26.   --Has had a week of steroids and we will discontinue and monitor.  - DuoNebs scheduled 4 times daily and albuterol nebs as needed  - Schedule Tessalon Perles 3 times daily, guaifenesin as needed  - Continuous pulse ox.   - Resume Flovent when she returns home  -Seen by PT today to determine needs at discharge.  They recommended home with assist.  She feels somewhat weak unclear yet if she will be able to care for herself.  Her granddaughter's family lives in the lower level of her house but are not there all the time.  -Possibly home tomorrow with home services.     Dementia  Granddaughter cares for her at home.     Anxiety:   Continue PTA citalopram and lorazepam      HLD: Can resume simvastatin at home after discharge.     Mild thrombocytopenia: Platelet count slightly lower than baseline at 144 in the ER.  Nothing further to work-up at this time          Diet: Regular Diet Adult (Combination Diet)  Diet    DVT Prophylaxis: Enoxaparin (Lovenox) SQ  Izaguirre Catheter: Not present  Lines: None     Cardiac Monitoring: None  Code Status: No CPR- Do NOT Intubate      Clinically Significant Risk Factors                         # Obesity: Estimated body mass index is 30.21 kg/m  as calculated from the following:    Height as of this encounter: 1.613 m (5' 3.5\").    Weight as of this encounter: 78.6 kg (173 lb 4.5 oz).             Disposition Plan      Expected Discharge Date: 11/28/2023, 12:00 PM    Destination: home;home with family;home with help/services              Goran Pickard " MD Delgado  Hospitalist Service  [facility]  Securely message with Tracey (more info)  Text page via [employer] Paging/Directory   ______________________________________________________________________    Interval History   Breathing is OK.  She is concerned about how she will manage at home an if she will be strong enough.      Physical Exam   Vital Signs: Temp: 98.3  F (36.8  C) Temp src: Oral BP: 123/56 Pulse: 76   Resp: 22 SpO2: 90 % O2 Device: None (Room air) Oxygen Delivery: 1 LPM  Weight: 173 lbs 4.5 oz      Vital signs reviewed  General:  Alert, calm, NAD  CV: regular rate and rhythm, no murmurs or rubs  Lungs: Some mild wheezing, normal respiratory effort  HEENT:  Pupil round, equal, conjuctivae, sclerae and lids normal, neck is supple  Abdomen:  Soft, nontender, nondistended, no masses, normal bowel sounds  Extremities:  No edema  Neuro: normal strength and sensation in all 4 extremities, cranial nerves grossly intact  Psychiatric:  Mood and affect within normal limits      Medical Decision Making             Data     WBC 14.3

## 2023-11-27 NOTE — PLAN OF CARE
End of Shift Summary  For vital signs and complete assessments, please see documentation flowsheets.     Pertinent assessments: A&O with occasional disorientation to time. On 1L per NC for comfort evening/night since O2 89-90% and SOB. PRN nebs. Lung sounds coarse w/ expiratory wheezing. Incontinence with cough, external catheter in place. Scheduled tessalon. PIV is SL. Contact & droplet precautions.   Major Shift Events: none  Treatment Plan: Nebs, Tessalon, Monitor cough/respiratory status.   Bedside Nurse: Ghada Torres RN

## 2023-11-27 NOTE — CONSULTS
Care Management Discharge Note    Discharge Date: 11/28/2023       Discharge Disposition: Home Care    Discharge Services:      Discharge DME:      Discharge Transportation: family or friend will provide    Private pay costs discussed: Not applicable    Does the patient's insurance plan have a 3 day qualifying hospital stay waiver?  No    PAS Confirmation Code:    Patient/family educated on Medicare website which has current facility and service quality ratings: yes    Education Provided on the Discharge Plan:  yes  Persons Notified of Discharge Plans: patient  Patient/Family in Agreement with the Plan:  yes    Handoff Referral Completed: No    Additional Information:  Consult placed for discharge planning. PT recommending home PT services. Patient admitted with respiratory failure found to have RSV.  Met with patient to introduce CM role and discuss discharge planning. Patient confirms she lives at home and her granddaughter and her family live in her lower level apartment. Her granddaughter is  with a 3 year old and does work.  Patient prior to admission has been independent with ADLs and IADLs except laundry and driving. Her granddaughter does provide support to her as needed and can assist more if needed however patient expresses concerns over burdening her. Discussed recommendations for home care and she is in agreement to send referral for home PT/RN and Ernst. She has used home care in the past but does not remember who and has no preferences. Will send referral via [facility] hub.    Will update patient and AVS once agency has been secured. Possibility of discharge tonight or tomorrow.    Addendum 1500:   Accepted by [facility] home care for RN/PT/HHA    Catherine Cárdenas RN BSN OCN  Care Coordinator  [facility]  [phone]

## 2023-11-27 NOTE — PLAN OF CARE
A/Ox4. Lung sounds coarse w/ expiratory wheezing. 93-94%/RA Stress incontinence with cough, external catheter in place. Scheduled tessalon.SL. Contact & droplet precautions. Anticipate home in 1-2 days per MD notes.     Major Shift Events:   Uneventful night, slept well. No significant change in condition.     Treatment Plan:   Nebs, Tessalon, Monitor cough/respiratory status. Maintain isolation precautions

## 2023-11-28 ENCOUNTER — APPOINTMENT (OUTPATIENT)
Dept: PHYSICAL THERAPY | Facility: CLINIC | Age: 83
DRG: 202 | End: 2023-11-28
Payer: COMMERCIAL

## 2023-11-28 VITALS
TEMPERATURE: 98.1 F | OXYGEN SATURATION: 91 % | SYSTOLIC BLOOD PRESSURE: 133 MMHG | DIASTOLIC BLOOD PRESSURE: 70 MMHG | BODY MASS INDEX: 29.58 KG/M2 | RESPIRATION RATE: 20 BRPM | HEIGHT: 64 IN | HEART RATE: 71 BPM | WEIGHT: 173.28 LBS

## 2023-11-28 PROCEDURE — 250N000011 HC RX IP 250 OP 636: Mod: JZ | Performed by: INTERNAL MEDICINE

## 2023-11-28 PROCEDURE — 94640 AIRWAY INHALATION TREATMENT: CPT

## 2023-11-28 PROCEDURE — 97530 THERAPEUTIC ACTIVITIES: CPT | Mod: GP | Performed by: PHYSICAL THERAPIST

## 2023-11-28 PROCEDURE — 99239 HOSP IP/OBS DSCHRG MGMT >30: CPT | Performed by: INTERNAL MEDICINE

## 2023-11-28 PROCEDURE — 250N000013 HC RX MED GY IP 250 OP 250 PS 637: Performed by: INTERNAL MEDICINE

## 2023-11-28 PROCEDURE — 250N000009 HC RX 250: Performed by: INTERNAL MEDICINE

## 2023-11-28 PROCEDURE — 999N000157 HC STATISTIC RCP TIME EA 10 MIN

## 2023-11-28 PROCEDURE — 97116 GAIT TRAINING THERAPY: CPT | Mod: GP | Performed by: PHYSICAL THERAPIST

## 2023-11-28 RX ADMIN — BENZONATATE 100 MG: 100 CAPSULE ORAL at 08:17

## 2023-11-28 RX ADMIN — SENNOSIDES AND DOCUSATE SODIUM 1 TABLET: 8.6; 5 TABLET ORAL at 08:16

## 2023-11-28 RX ADMIN — IPRATROPIUM BROMIDE AND ALBUTEROL SULFATE 3 ML: .5; 3 SOLUTION RESPIRATORY (INHALATION) at 08:48

## 2023-11-28 RX ADMIN — ENOXAPARIN SODIUM 40 MG: 40 INJECTION SUBCUTANEOUS at 08:16

## 2023-11-28 RX ADMIN — LORAZEPAM 0.5 MG: 0.5 TABLET ORAL at 08:16

## 2023-11-28 RX ADMIN — FEXOFENADINE HYDROCHLORIDE 60 MG: 60 TABLET ORAL at 08:17

## 2023-11-28 ASSESSMENT — ACTIVITIES OF DAILY LIVING (ADL)
ADLS_ACUITY_SCORE: 32
ADLS_ACUITY_SCORE: 29
ADLS_ACUITY_SCORE: 29
ADLS_ACUITY_SCORE: 32

## 2023-11-28 NOTE — PLAN OF CARE
Goal Outcome Evaluation:      Plan of Care Reviewed With: patient    Overall Patient Progress: improvingOverall Patient Progress: improving  To Do:  End of Shift Summary  For vital signs and complete assessments, please see documentation flowsheets.     Pertinent assessments: A&O, on RA. Denies pain SOB and nausea SBA in the room. Bed alarm on. Up to chair for meals. BP slightly elevated, VSS. Given scheduled  Tessalon pearls for cough. Voiding without issues, has 1 loose stool on this shift. Tolerated regular diet. Plan to discharge today with HC RN/HHA/PT.     Major Shift Events: None    Treatment Plan: Tesslon pearls, encourage activity.     Bedside Nurse: Cliff Carrero RN

## 2023-11-28 NOTE — PROGRESS NOTES
Care Management Discharge Note    Discharge Date: 11/28/2023       Discharge Disposition: Home Care    Discharge Services:      Discharge DME:      Discharge Transportation: family or friend will provide    Private pay costs discussed: Not applicable    Does the patient's insurance plan have a 3 day qualifying hospital stay waiver?  No    PAS Confirmation Code:    Patient/family educated on Medicare website which has current facility and service quality ratings: yes    Education Provided on the Discharge Plan: yes  Persons Notified of Discharge Plans: patient and son  Patient/Family in Agreement with the Plan:  yes    Additional Information:  Patient and family had concerns regarding discharge home. PT worked with patient and did stairs. Patient did well and no mobility concerns. Discussed with patient and son and they are in agreement to go home with home care services.  [facility] notified of discharge plan and AVS updated.  Son will provide transport home    Catherine Cárdenas RN BSN OCN  Care Coordinator  [facility]  [phone]

## 2023-11-28 NOTE — PLAN OF CARE
A&Ox4. Up SBA with walker and gait belt, needs lots of encouragement for activity. Up to chair for lunch meal. Showered this afternoon. Scheduled tesslon pearls given. Elevated BP this /75, recheck this afternoon 133/54. Steroids discontinued by provider. Granddaughter at bedside. Plan to discharge tomorrow with HC.

## 2023-11-28 NOTE — DISCHARGE SUMMARY
Discharge Note    Patient discharged to home with services via private vehicle  accompanied by son.  IV: Discontinued  Prescriptions N/A.   Belongings reviewed and sent with patient and family.   Home medications returned to patient: NA  Equipment sent with: patient, N/A.   patient and family verbalizes understanding of discharge instructions. AVS given to patient.  Additional education completed?  Explained the patient her next upcomming appointment.

## 2023-11-28 NOTE — PLAN OF CARE
Physical Therapy Discharge Summary    Reason for therapy discharge:    Discharged to home with home therapy.    Progress towards therapy goal(s). See goals on Care Plan in [facility] electronic health record for goal details.  Goals partially met.  Barriers to achieving goals:   discharge from facility.    Therapy recommendation(s):    Continued therapy is recommended.  Rationale/Recommendations:  Pt below baseline level of mobility, currently SBA with FWW, limited by fatigue and activity tolerance. Pt's granddaughter is primary caregiver, pt has 24/7 assist at home. Recommending returning home with granddaughter and HHPT to progress independence with mobility, activity tolerance, and strength; use of FWW for stability.

## 2023-11-29 NOTE — DISCHARGE SUMMARY
"[facility]  Hospitalist Discharge Summary      Date of Admission:  11/20/2023  Date of Discharge:  11/28/2023 12:33 PM  Discharging Provider: Goran Natarajan MD  Discharge Service: Hospitalist Service    Discharge Diagnoses   RSV infection and associated reactive airway exacerbation    Clinically Significant Risk Factors     # Obesity: Estimated body mass index is 30.21 kg/m  as calculated from the following:    Height as of this encounter: 1.613 m (5' 3.5\").    Weight as of this encounter: 78.6 kg (173 lb 4.5 oz).       Follow-ups Needed After Discharge   Follow-up Appointments     Follow-up and recommended labs and tests       Follow up with primary care in 5 days              Discharge Disposition   Discharged to home  Condition at discharge: Stable    Hospital Course   Keisha Kolb is a 83 year old female who lives alone with granddaughter's family living on the floor below, HLD, anxiety, and intermittent episodes of shortness of breath and wheezing ever since COVID-19 infection last year without formal diagnosis of asthma or COPD who was admitted on 11/20/2023 after presenting with very frequent nonproductive cough and shortness of breath with wheezing for the last few days.  Patient's granddaughter was ill with a cough for a few days before her symptoms started. Came to the ER due to severe shortness of breath.     In the ER she was afebrile and initially hypertensive.  O2 sats in the low 90s at rest, however when she ambulates they drop to 90% and she looks very short of breath.  She was wheezing significantly in the ER.  CBC and BMP are unremarkable.  Lactic acid not elevated.  Chest x-ray did not show any infiltrate.  RSV PCR is positive which is the etiology for cough resulting in a reactive airway disease exacerbation.  Received IV Solu-Medrol and nebs. Admitted to inpatient. Continues to require steroids. Has weaned off O2 on 11/26 while at rest.  PT consulted who felt " she was ambulating safely to discharge to home..     Problem List/Assessment and Plan:   RSV infection  Reactive airway disease exacerbation  Acute respiratory failure with hypoxia  No formal diagnosis of asthma or COPD, however has had issues with bronchospasm and wheezing with intermittent shortness of breath since having COVID-19 a year ago.  Presented here with nonproductive frequent cough for the past few days with sick contact with similar symptoms being her granddaughter who helps care for her.  For the last couple of days she has had significant shortness of breath and wheezing.  RSV PCR is positive.  COVID-19, influenza A/B PCR negative.  Wheezing and shortness of breath secondary to reactive airway disease exacerbation.  Chest x-ray does not show any significant infiltrate.    -Weaned off of supplemental oxygen  - Given degree of symptoms, obtained repeat CXR 11/24 and no infiltrate noted. Likely all RSV infection with reactive airway response.  -Treated with steroids during her hospitalization and respiratory status continued to improve.  Will be discontinued at discharge.  -Also agree with IV mag initially and bronchodilators.  - Resume Flovent when she returns home  -Fortunately her respiratory symptoms improved dramatically during her hospitalization and she was ambulating well prior to discharge.  She is discharged home with home services in stable condition.        Anxiety:   Continue PTA citalopram and lorazepam      HLD: Can resume simvastatin at home after discharge.     Mild thrombocytopenia: Platelet count slightly lower than baseline at 144 in the ER.  Nothing further to work-up at this time       Consultations This Hospital Stay   PHYSICAL THERAPY ADULT IP CONSULT  CARE MANAGEMENT / SOCIAL WORK IP CONSULT  CARE MANAGEMENT / SOCIAL WORK IP CONSULT    Code Status   Prior    Time Spent on this Encounter   ABELINO, Goran Natarajan MD, personally saw the patient today and spent greater than 30  minutes discharging this patient.       Goran Natarajan MD  [facility] MEDICAL SURGICAL  [address]  [facility] [address]  Phone: [phone]  Fax: [phone]  ______________________________________________________________________    Physical Exam   Vital Signs:                    Weight: 173 lbs 4.5 oz         Primary Care Physician   MALAIKA BOLDEN    Discharge Orders      Home Care Referral      Reason for your hospital stay    RSV infection     Follow-up and recommended labs and tests     Follow up with primary care in 5 days     Activity    Your activity upon discharge: activity as tolerated     Diet    Follow this diet upon discharge: Orders Placed This Encounter      Regular Diet Adult (Combination Diet)       Significant Results and Procedures   Most Recent 3 CBC's:  Recent Labs   Lab Test 11/27/23  0603 11/26/23  0704 11/25/23  0655   WBC 14.3* 13.0* 10.6   HGB 14.1 13.7 12.9   MCV 91 92 94    193 149*     Most Recent 3 BMP's:  Recent Labs   Lab Test 11/27/23  0603 11/26/23  0704 11/25/23  0655    140 139   POTASSIUM 4.3 4.6 4.7   CHLORIDE 99 103 100   CO2 28 30* 31*   BUN 21.9 18.4 14.6   CR 0.79 0.78 0.84   ANIONGAP 9 7 8   KRISTEL 8.7* 8.6* 8.7*   * 132* 100*   ,   Results for orders placed or performed during the hospital encounter of 11/20/23   Chest XR,  PA & LAT    Narrative    EXAM: XR CHEST 2 VIEWS  LOCATION: [facility]  DATE: 11/20/2023    INDICATION: cough, pneumonia?  COMPARISON: Chest radiographs 6/23/2023 and 2/23/2023. CTA chest 10/24/2022.      Impression    IMPRESSION: Slight atelectasis/scarring of the lower right lung. Mild elevation of the left hemidiaphragm. Lungs otherwise clear. Mild cardiomegaly. Normal pulmonary vascularity. No pleural fluid or pneumothorax.   XR Chest Port 1 View    Narrative    XR CHEST PORT 1 VIEW 11/24/2023 12:45 PM    HISTORY: Follow up respiratory status    COMPARISON:  11/20/2023      Impression    IMPRESSION: No acute cardiopulmonary process. No interval change.    BENNIE BAILEY MD         SYSTEM ID:  [id number]       Discharge Medications   Discharge Medication List as of 11/28/2023 12:20 PM        CONTINUE these medications which have NOT CHANGED    Details   acetaminophen (TYLENOL) 325 MG tablet Take 2 tablets (650 mg) by mouth every 6 hours as needed for mild pain or other (and adjunct with moderate or severe pain or per patient request), No Print Out      albuterol (PROAIR HFA/PROVENTIL HFA/VENTOLIN HFA) 108 (90 Base) MCG/ACT inhaler Inhale 2 puffs into the lungs every 6 hours as needed for shortness of breath, wheezing or cough, Disp-18 g, R-0, E-PrescribePharmacy may dispense brand covered by insurance (Proair, or proventil or ventolin or generic albuterol inhaler)      CITALOPRAM HYDROBROMIDE PO Take 40 mg by mouth At Bedtime, Historical      EPINEPHrine (ANY BX GENERIC EQUIV) 0.3 MG/0.3ML injection 2-pack Inject 0.3 mLs (0.3 mg) into the muscle once as needed for anaphylaxis May repeat one time in 5-15 minutes if response to initial dose is inadequate., Disp-2 each, R-0, E-Prescribe      fexofenadine (ALLEGRA) 60 MG tablet Take 60 mg by mouth 2 times daily, Historical      FLOVENT DISKUS 100 MCG/ACT inhaler INHALE 1 PUFF BY MOUTH TWICE DAILY. RINSE MOUTH /. GARGLE AFTER USE, KENYON, Historical      LORAZEPAM PO Take 0.5 mg by mouth every morning, Historical      SIMVASTATIN PO Take 20 mg by mouth At Bedtime, Historical      vitamin D3 (CHOLECALCIFEROL) 50 mcg (2000 units) tablet Take 1 tablet by mouth daily, Historical           Allergies   No Known Allergies

## 2024-01-10 ENCOUNTER — HOSPITAL ENCOUNTER (EMERGENCY)
Facility: CLINIC | Age: 84
Discharge: HOME | End: 2024-01-10
Attending: PHYSICIAN ASSISTANT | Admitting: PHYSICIAN ASSISTANT
Payer: COMMERCIAL

## 2024-01-10 ENCOUNTER — APPOINTMENT (OUTPATIENT)
Dept: CT IMAGING | Facility: CLINIC | Age: 84
End: 2024-01-10
Attending: PHYSICIAN ASSISTANT
Payer: COMMERCIAL

## 2024-01-10 VITALS
SYSTOLIC BLOOD PRESSURE: 140 MMHG | OXYGEN SATURATION: 100 % | DIASTOLIC BLOOD PRESSURE: 84 MMHG | HEART RATE: 68 BPM | RESPIRATION RATE: 18 BRPM | TEMPERATURE: 98.7 F

## 2024-01-10 DIAGNOSIS — S51.011A LACERATION OF RIGHT ELBOW, INITIAL ENCOUNTER: ICD-10-CM

## 2024-01-10 DIAGNOSIS — M25.521 RIGHT ELBOW PAIN: ICD-10-CM

## 2024-01-10 DIAGNOSIS — S09.90XA CLOSED HEAD INJURY, INITIAL ENCOUNTER: ICD-10-CM
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
## 2024-01-10 PROCEDURE — 250N000009 HC RX 250: Performed by: PHYSICIAN ASSISTANT

## 2024-01-10 PROCEDURE — 250N000013 HC RX MED GY IP 250 OP 250 PS 637: Performed by: PHYSICIAN ASSISTANT

## 2024-01-10 PROCEDURE — 12001 RPR S/N/AX/GEN/TRNK 2.5CM/<: CPT | Mod: 59

## 2024-01-10 PROCEDURE — 72125 CT NECK SPINE W/O DYE: CPT

## 2024-01-10 PROCEDURE — 70450 CT HEAD/BRAIN W/O DYE: CPT

## 2024-01-10 PROCEDURE — 99284 EMERGENCY DEPT VISIT MOD MDM: CPT | Mod: 25

## 2024-01-10 RX ORDER — ACETAMINOPHEN 325 MG/1
650 TABLET ORAL ONCE
Status: COMPLETED | OUTPATIENT
Start: 2024-01-10 | End: 2024-01-10

## 2024-01-10 RX ADMIN — Medication: at 15:34

## 2024-01-10 RX ADMIN — ACETAMINOPHEN 650 MG: 325 TABLET, FILM COATED ORAL at 14:34

## 2024-01-10 ASSESSMENT — ACTIVITIES OF DAILY LIVING (ADL): ADLS_ACUITY_SCORE: 35

## 2024-01-10 NOTE — ED TRIAGE NOTES
Pt presents to ED via EMS from her home where she lives with her granddaughter. Pt was in the shower, slipped, and fell. Pt hit her head and right elbow. Pt c/o head pain, back pain, R elbow pain. Pt denies blood thinners. Skin tear to right elbow. Pt alert and answering questions appropriately. C collar applied by EMS, present on arrival.

## 2024-01-10 NOTE — ED PROVIDER NOTES
History     Chief Complaint:  Fall and Head Injury       HPI   Keisha Kolb is a 83 year old female who presents to the ED via EMS for evaluation after a fall in which she struck her head.  Patient reports immediately prior to presentation she was sitting on a bench in her shower when she slid forward off of the bench, causing her to fall onto her buttocks and strike the back of her head on the bench.  She did hit her right elbow as well.  She denies any preceding symptoms, noting that this was a slip from a seated position.  No LOC, headache, vision changes.  She denies neck pain, numbness or tingling.  No current chest pain, dyspnea, abdominal pain, nausea, vomiting.  She denies pain to her lower extremities.  She denies pain to her left upper extremity.  She is right-handed.    Independent Historian:   None - Patient Only    Review of External Notes:   None    Medications:    acetaminophen (TYLENOL) 325 MG tablet  albuterol (PROAIR HFA/PROVENTIL HFA/VENTOLIN HFA) 108 (90 Base) MCG/ACT inhaler  CITALOPRAM HYDROBROMIDE PO  EPINEPHrine (ANY BX GENERIC EQUIV) 0.3 MG/0.3ML injection 2-pack  fexofenadine (ALLEGRA) 60 MG tablet  FLOVENT DISKUS 100 MCG/ACT inhaler  LORAZEPAM PO  SIMVASTATIN PO  vitamin D3 (CHOLECALCIFEROL) 50 mcg (2000 units) tablet        Past Medical History:    Past Medical History:   Diagnosis Date    Anxiety     Squamous cell carcinoma of skin, unspecified        Past Surgical History:    Past Surgical History:   Procedure Laterality Date    masectomy          Physical Exam   Patient Vitals for the past 24 hrs:   BP Temp Temp src Pulse Resp SpO2   01/10/24 1514 (!) 174/83 -- -- 67 -- 96 %   01/10/24 1444 (!) 168/80 -- -- 69 -- 96 %   01/10/24 1429 (!) 173/88 -- -- 69 -- --   01/10/24 1415 (!) 168/80 -- -- 73 -- 93 %   01/10/24 1414 -- 98.7  F (37.1  C) Oral -- -- --   01/10/24 1403 -- -- -- -- 16 --   01/10/24 1402 -- -- -- -- -- 94 %   01/10/24 1400 (!) 175/85 -- -- 72 -- --   01/10/24 0529  (!) 168/89 -- -- 68 -- --        Physical Exam  Constitutional: Pleasant. Cooperative.   Eyes: Pupils equally round and reactive  HENT: No scalp hematoma. No scalp tenderness. No bony step-off or crepitus. No facial bone tenderness or instability. No periorbital ecchymosis or Melvin signs. Oropharynx is normal with moist mucus membranes. No evidence for intraoral injury.  Cardiovascular: Regular rate and rhythm and without murmurs.  Respiratory: Normal respiratory effort, lungs are clear bilaterally.  GI: Abdomen is soft, non-tender, non-distended. No guarding, rebound, or rigidity.  Musculoskeletal: No midline cervical, thoracic, or lumbar tenderness. In C collar. No clavicular tenderness. Mild TTP to right olecranon process, otherwise no other upper extremity tenderness. No lower extremity tenderness. Normal ROM of extremities. No tenderness with compression of the rib cage or pelvis.  Skin: No rashes. 2cm laceration noted overlying right olecranon process.  Neurologic: Cranial nerves grossly intact, normal cognition, no focal deficits. Alert and oriented x 3. GCS 15  Psychiatric: Normal affect.  Nursing notes and vital signs reviewed.    Emergency Department Course     Imaging:  CT Cervical Spine w/o Contrast   Final Result   IMPRESSION: Mild degenerative anterolisthesis of C4 on C5 and C5 upon   C6. Alignment of the cervical vertebrae is otherwise normal. Vertebral   body heights of the cervical spine are normal. Craniocervical   alignment is normal. There are no fractures of the cervical spine.    Loss of disc space height and degenerative endplate spurring at C6-C7.   Facet arthropathy throughout the cervical spine. No prevertebral soft   tissue swelling.      Radiation dose for this scan was reduced using automated exposure   control, adjustment of the mA and/or kV according to patient size, or   iterative reconstruction technique.        SAAD MOSES MD            SYSTEM ID:  [id number]      Head CT w/o  contrast   Final Result   IMPRESSION: Diffuse cerebral volume loss and cerebral white matter   changes consistent with chronic small vessel ischemic disease. No   evidence for acute intracranial pathology.      Radiation dose for this scan was reduced using automated exposure   control, adjustment of the mA and/or kV according to patient size, or   iterative reconstruction technique.        SAAD MOSES MD            SYSTEM ID:  [id number]           Procedures     Narrative: Procedure: Laceration Repair        LACERATION:  A simple clean 2 cm laceration.      LOCATION:  right olecranon process      FUNCTION:  Distally sensation, circulation, motor, and tendon function are intact.      ANESTHESIA:  LET - Topical      PREPARATION:  Irrigation with Normal Saline      DEBRIDEMENT:  wound explored, no foreign body found      CLOSURE:  Wound was closed with One Layer.  Skin closed with 5 x 4.0 Ethylon using interrupted sutures.      Emergency Department Course & Assessments:    Interventions:  Medications   acetaminophen (TYLENOL) tablet 650 mg (650 mg Oral $Given 1/10/24 1434)   lido-EPINEPHrine-tetracaine (LET) topical gel GEL ( Topical $Given 1/10/24 1534)      Independent Interpretation (X-rays, CTs, rhythm strip):  None    Consultations/Discussion of Management or Tests:  None        Social Determinants of Health affecting care:   None    Disposition:  The patient was discharged to home.     Impression & Plan      Medical Decision Making:  Keisha Kolb is a 83 year old female who presents to the ED for evaluation after fall.  There is a mechanical fall, she slipped off of a bench while taking a shower.  She did strike her head.  No blood thinner use.  See HPI as above for additional details.  Vitals and physical exam as above.  CT obtained of head and C-spine returned negative for acute intracranial and bony abnormality.  Patient does have tenderness to the olecranon process with overlying laceration, however  has full extension of elbow, thus low suspicion for fracture, will defer on imaging at this time.  Based upon full exam, no indication for any further imaging otherwise.  Laceration repaired as above.  Discussed closed head precautions as well as wound cares and precautions.  Leonardo patient was safe for discharge to home. Discussed reasons to return. All questions answered. Patient discharged to home in stable condition.    Diagnosis:    ICD-10-CM    1. Fall, initial encounter  W19.XXXA       2. Closed head injury, initial encounter  S09.90XA       3. Right elbow pain  M25.521       4. Laceration of right elbow, initial encounter  S51.011A            Discharge Medications:  New Prescriptions    No medications on file      This record was created at least in part using electronic voice recognition software, so please excuse any typographical errors.         Ayo Curiel PA-C  01/10/24 3942

## 2024-01-10 NOTE — ED NOTES
Bed: ED17  Expected date: 1/10/24  Expected time: 1:42 PM  Means of arrival:   Comments:  Luis Knight

## 2024-01-10 NOTE — ED NOTES
Discharged Patient. Wound covered @Right elbow. Wound Care Instruction given. Follow-up Instruction for removal of suture advised.

## 2024-01-10 NOTE — DISCHARGE INSTRUCTIONS
CTs of your head and neck return without any acute abnormalities. There was no indication for any other imaging based upon your exam here today.    With respect to your laceration, keep this clean and covered, especially for the first five days. Evaluate and clean wound daily for pus drainage, spreading redness, streaking redness up the arm, seek out further cares should these develop. Clean with gentle soap and water. Use Tylenol 1000mg every 8 hours for any pain. Sutures need to be removed at primary doctor in 10-14 days.

## 2024-01-19 ENCOUNTER — APPOINTMENT (OUTPATIENT)
Dept: GENERAL RADIOLOGY | Facility: CLINIC | Age: 84
DRG: 193 | End: 2024-01-19
Attending: EMERGENCY MEDICINE
Payer: COMMERCIAL

## 2024-01-19 ENCOUNTER — HOSPITAL ENCOUNTER (INPATIENT)
Facility: CLINIC | Age: 84
LOS: 2 days | Discharge: NURSING, REHAB OR LONG-TERM CARE | DRG: 193 | End: 2024-01-22
Attending: EMERGENCY MEDICINE | Admitting: STUDENT IN AN ORGANIZED HEALTH CARE EDUCATION/TRAINING PROGRAM
Payer: COMMERCIAL

## 2024-01-19 ENCOUNTER — APPOINTMENT (OUTPATIENT)
Dept: CT IMAGING | Facility: CLINIC | Age: 84
DRG: 193 | End: 2024-01-19
Attending: EMERGENCY MEDICINE
Payer: COMMERCIAL

## 2024-01-19 DIAGNOSIS — J18.9 COMMUNITY ACQUIRED PNEUMONIA OF LEFT UPPER LOBE OF LUNG: ICD-10-CM

## 2024-01-19 DIAGNOSIS — J96.01 ACUTE RESPIRATORY FAILURE WITH HYPOXIA (H): Primary | ICD-10-CM

## 2024-01-19 LAB
ALBUMIN UR-MCNC: 30 MG/DL
ANION GAP SERPL CALCULATED.3IONS-SCNC: 10 MMOL/L (ref 7–15)
APPEARANCE UR: CLEAR
BASOPHILS # BLD AUTO: 0 10E3/UL (ref 0–0.2)
BASOPHILS NFR BLD AUTO: 0 %
BILIRUB UR QL STRIP: NEGATIVE
BUN SERPL-MCNC: 11.1 MG/DL (ref 8–23)
CALCIUM SERPL-MCNC: 8.7 MG/DL (ref 8.8–10.2)
CHLORIDE SERPL-SCNC: 98 MMOL/L (ref 98–107)
COLOR UR AUTO: YELLOW
CREAT SERPL-MCNC: 0.77 MG/DL (ref 0.51–0.95)
D DIMER PPP FEU-MCNC: 1.01 UG/ML FEU (ref 0–0.5)
DEPRECATED HCO3 PLAS-SCNC: 26 MMOL/L (ref 22–29)
EGFRCR SERPLBLD CKD-EPI 2021: 76 ML/MIN/1.73M2
EOSINOPHIL # BLD AUTO: 0.1 10E3/UL (ref 0–0.7)
EOSINOPHIL NFR BLD AUTO: 1 %
ERYTHROCYTE [DISTWIDTH] IN BLOOD BY AUTOMATED COUNT: 13.2 % (ref 10–15)
FLUAV RNA SPEC QL NAA+PROBE: NEGATIVE
FLUBV RNA RESP QL NAA+PROBE: NEGATIVE
GLUCOSE SERPL-MCNC: 113 MG/DL (ref 70–99)
GLUCOSE UR STRIP-MCNC: NEGATIVE MG/DL
HCO3 BLDV-SCNC: 28 MMOL/L (ref 21–28)
HCT VFR BLD AUTO: 40.3 % (ref 35–47)
HGB BLD-MCNC: 13.5 G/DL (ref 11.7–15.7)
HGB UR QL STRIP: ABNORMAL
HOLD SPECIMEN: NORMAL
HYALINE CASTS: 1 /LPF
IMM GRANULOCYTES # BLD: 0.1 10E3/UL
IMM GRANULOCYTES NFR BLD: 0 %
INR PPP: 1.07 (ref 0.85–1.15)
KETONES UR STRIP-MCNC: 20 MG/DL
LACTATE BLD-SCNC: 0.6 MMOL/L
LEUKOCYTE ESTERASE UR QL STRIP: NEGATIVE
LYMPHOCYTES # BLD AUTO: 1.6 10E3/UL (ref 0.8–5.3)
LYMPHOCYTES NFR BLD AUTO: 12 %
MCH RBC QN AUTO: 30.4 PG (ref 26.5–33)
MCHC RBC AUTO-ENTMCNC: 33.5 G/DL (ref 31.5–36.5)
MCV RBC AUTO: 91 FL (ref 78–100)
MONOCYTES # BLD AUTO: 1.5 10E3/UL (ref 0–1.3)
MONOCYTES NFR BLD AUTO: 11 %
NEUTROPHILS # BLD AUTO: 10.1 10E3/UL (ref 1.6–8.3)
NEUTROPHILS NFR BLD AUTO: 76 %
NITRATE UR QL: NEGATIVE
NRBC # BLD AUTO: 0 10E3/UL
NRBC BLD AUTO-RTO: 0 /100
NT-PROBNP SERPL-MCNC: 421 PG/ML (ref 0–1800)
PCO2 BLDV: 46 MM HG (ref 40–50)
PH BLDV: 7.38 [PH] (ref 7.32–7.43)
PH UR STRIP: 6 [PH] (ref 5–7)
PLATELET # BLD AUTO: 185 10E3/UL (ref 150–450)
PO2 BLDV: 24 MM HG (ref 25–47)
POTASSIUM SERPL-SCNC: 3.7 MMOL/L (ref 3.4–5.3)
RBC # BLD AUTO: 4.44 10E6/UL (ref 3.8–5.2)
RBC URINE: 3 /HPF
RSV RNA SPEC NAA+PROBE: NEGATIVE
SAO2 % BLDV: 42 % (ref 70–75)
SARS-COV-2 RNA RESP QL NAA+PROBE: NEGATIVE
SODIUM SERPL-SCNC: 134 MMOL/L (ref 135–145)
SP GR UR STRIP: 1.01 (ref 1–1.03)
SQUAMOUS EPITHELIAL: 1 /HPF
TROPONIN T SERPL HS-MCNC: 12 NG/L
UROBILINOGEN UR STRIP-MCNC: NORMAL MG/DL
WBC # BLD AUTO: 13.3 10E3/UL (ref 4–11)
WBC URINE: 3 /HPF

## 2024-01-19 PROCEDURE — 84484 ASSAY OF TROPONIN QUANT: CPT | Performed by: EMERGENCY MEDICINE

## 2024-01-19 PROCEDURE — 250N000011 HC RX IP 250 OP 636: Performed by: EMERGENCY MEDICINE

## 2024-01-19 PROCEDURE — G0378 HOSPITAL OBSERVATION PER HR: HCPCS

## 2024-01-19 PROCEDURE — 85379 FIBRIN DEGRADATION QUANT: CPT | Performed by: EMERGENCY MEDICINE

## 2024-01-19 PROCEDURE — 80048 BASIC METABOLIC PNL TOTAL CA: CPT | Performed by: EMERGENCY MEDICINE

## 2024-01-19 PROCEDURE — 87637 SARSCOV2&INF A&B&RSV AMP PRB: CPT | Performed by: EMERGENCY MEDICINE

## 2024-01-19 PROCEDURE — 96365 THER/PROPH/DIAG IV INF INIT: CPT

## 2024-01-19 PROCEDURE — 82803 BLOOD GASES ANY COMBINATION: CPT

## 2024-01-19 PROCEDURE — 71046 X-RAY EXAM CHEST 2 VIEWS: CPT

## 2024-01-19 PROCEDURE — 71275 CT ANGIOGRAPHY CHEST: CPT

## 2024-01-19 PROCEDURE — 36415 COLL VENOUS BLD VENIPUNCTURE: CPT | Performed by: EMERGENCY MEDICINE

## 2024-01-19 PROCEDURE — 85610 PROTHROMBIN TIME: CPT | Performed by: EMERGENCY MEDICINE

## 2024-01-19 PROCEDURE — 81001 URINALYSIS AUTO W/SCOPE: CPT | Performed by: STUDENT IN AN ORGANIZED HEALTH CARE EDUCATION/TRAINING PROGRAM

## 2024-01-19 PROCEDURE — 99285 EMERGENCY DEPT VISIT HI MDM: CPT | Mod: 25

## 2024-01-19 PROCEDURE — 85025 COMPLETE CBC W/AUTO DIFF WBC: CPT | Performed by: EMERGENCY MEDICINE

## 2024-01-19 PROCEDURE — 250N000013 HC RX MED GY IP 250 OP 250 PS 637: Performed by: EMERGENCY MEDICINE

## 2024-01-19 PROCEDURE — 250N000013 HC RX MED GY IP 250 OP 250 PS 637: Performed by: STUDENT IN AN ORGANIZED HEALTH CARE EDUCATION/TRAINING PROGRAM

## 2024-01-19 PROCEDURE — 83880 ASSAY OF NATRIURETIC PEPTIDE: CPT | Performed by: EMERGENCY MEDICINE

## 2024-01-19 PROCEDURE — 99222 1ST HOSP IP/OBS MODERATE 55: CPT | Performed by: STUDENT IN AN ORGANIZED HEALTH CARE EDUCATION/TRAINING PROGRAM

## 2024-01-19 RX ORDER — ONDANSETRON 2 MG/ML
4 INJECTION INTRAMUSCULAR; INTRAVENOUS EVERY 6 HOURS PRN
Status: DISCONTINUED | OUTPATIENT
Start: 2024-01-19 | End: 2024-01-22 | Stop reason: HOSPADM

## 2024-01-19 RX ORDER — CEFTRIAXONE 2 G/1
2 INJECTION, POWDER, FOR SOLUTION INTRAMUSCULAR; INTRAVENOUS ONCE
Status: COMPLETED | OUTPATIENT
Start: 2024-01-19 | End: 2024-01-19

## 2024-01-19 RX ORDER — DOXYCYCLINE 100 MG/1
100 CAPSULE ORAL 2 TIMES DAILY
Qty: 13 CAPSULE | Refills: 0 | Status: SHIPPED | OUTPATIENT
Start: 2024-01-19 | End: 2024-01-22

## 2024-01-19 RX ORDER — AMOXICILLIN 250 MG
1 CAPSULE ORAL 2 TIMES DAILY PRN
Status: DISCONTINUED | OUTPATIENT
Start: 2024-01-19 | End: 2024-01-22 | Stop reason: HOSPADM

## 2024-01-19 RX ORDER — DOXYCYCLINE 100 MG/1
100 CAPSULE ORAL ONCE
Status: COMPLETED | OUTPATIENT
Start: 2024-01-19 | End: 2024-01-19

## 2024-01-19 RX ORDER — ONDANSETRON 4 MG/1
4 TABLET, ORALLY DISINTEGRATING ORAL EVERY 6 HOURS PRN
Status: DISCONTINUED | OUTPATIENT
Start: 2024-01-19 | End: 2024-01-22 | Stop reason: HOSPADM

## 2024-01-19 RX ORDER — AMOXICILLIN 250 MG
2 CAPSULE ORAL 2 TIMES DAILY PRN
Status: DISCONTINUED | OUTPATIENT
Start: 2024-01-19 | End: 2024-01-22 | Stop reason: HOSPADM

## 2024-01-19 RX ORDER — POLYETHYLENE GLYCOL 3350 17 G/17G
17 POWDER, FOR SOLUTION ORAL 2 TIMES DAILY PRN
Status: DISCONTINUED | OUTPATIENT
Start: 2024-01-19 | End: 2024-01-22 | Stop reason: HOSPADM

## 2024-01-19 RX ORDER — CEFTRIAXONE 2 G/1
2 INJECTION, POWDER, FOR SOLUTION INTRAMUSCULAR; INTRAVENOUS EVERY 24 HOURS
Status: DISCONTINUED | OUTPATIENT
Start: 2024-01-20 | End: 2024-01-22 | Stop reason: HOSPADM

## 2024-01-19 RX ORDER — CITALOPRAM HYDROBROMIDE 20 MG/1
40 TABLET ORAL AT BEDTIME
Status: DISCONTINUED | OUTPATIENT
Start: 2024-01-19 | End: 2024-01-22 | Stop reason: HOSPADM

## 2024-01-19 RX ORDER — IOPAMIDOL 755 MG/ML
500 INJECTION, SOLUTION INTRAVASCULAR ONCE
Status: COMPLETED | OUTPATIENT
Start: 2024-01-19 | End: 2024-01-19

## 2024-01-19 RX ORDER — DOXYCYCLINE 100 MG/1
100 CAPSULE ORAL EVERY 12 HOURS SCHEDULED
Status: DISCONTINUED | OUTPATIENT
Start: 2024-01-20 | End: 2024-01-22 | Stop reason: HOSPADM

## 2024-01-19 RX ADMIN — DOXYCYCLINE HYCLATE 100 MG: 100 CAPSULE ORAL at 15:39

## 2024-01-19 RX ADMIN — IOPAMIDOL 61 ML: 755 INJECTION, SOLUTION INTRAVENOUS at 14:28

## 2024-01-19 RX ADMIN — CEFTRIAXONE 2 G: 2 INJECTION, POWDER, FOR SOLUTION INTRAMUSCULAR; INTRAVENOUS at 15:38

## 2024-01-19 RX ADMIN — CITALOPRAM HYDROBROMIDE 40 MG: 20 TABLET ORAL at 23:20

## 2024-01-19 ASSESSMENT — ACTIVITIES OF DAILY LIVING (ADL)
ADLS_ACUITY_SCORE: 35
ADLS_ACUITY_SCORE: 20
ADLS_ACUITY_SCORE: 35

## 2024-01-19 NOTE — ED NOTES
[facility]  ED Nurse Handoff Report    ED Chief complaint: Cough and Shortness of Breath  . ED Diagnosis:   Final diagnoses:   Community acquired pneumonia of left upper lobe of lung       Allergies: No Known Allergies    Code Status: Full Code    Activity level - Baseline/Home:  independent.  Activity Level - Current:   in bed.   Lift room needed: Yes.   Bariatric: No   Needed: No   Isolation: No.   Infection: Not Applicable.     Respiratory status: Nasal cannula - currently at 1L O2    Vital Signs (within 30 minutes):   Vitals:    01/19/24 1510 01/19/24 1530 01/19/24 1600 01/19/24 1615   BP:  (!) 148/101     Pulse:  (!) 33     Resp:       Temp:       TempSrc:       SpO2: 94% 91% 92% 92%   Weight:       Height:           Cardiac Rhythm:  ,      Pain level:  N/A  Patient confused:  Alert to person and place .   Patient Falls Risk: nonskid shoes/slippers when out of bed, arm band in place, and patient and family education.   Elimination Status: Has voided and purewick placed      Patient Report - Initial Complaint: .Keisha Kolb is a 83 year old female with history of breast cancer and asthma who presents to the ED with shortness of breath and coughing. Patient is not anticoagulated. Patient reports having worsening shortness of breath for the last few days. Shortness of breath has been an ongoing issue and is on O2 at home. Patient was brought in today when her nurse measured her O2 in the low 90s. Denies exacerbating factors for shortness of breath such as walking up stairs. Additionally endorses coughing, but this has not been worsening. Son reports patient's symptoms in the past have been worse than today. Patient had RSV 3 months ago and has gotten Covid/Flu vaccine a couple months ago. Patient currently lives at home with son and granddaughter, who is an adult. Son states patient had a fall 3 weeks ago and hither head. Denies vomiting, diarrhea, leg swelling, chest pain,  and fever. Denies blood clot in legs/lungs, smoking, and drinking.       Focused Assessment: Pt failed road test. Pt is SOB and very weak. Unable to roll side to side in bed.     Abnormal Results:   Labs Ordered and Resulted from Time of ED Arrival to Time of ED Departure   BASIC METABOLIC PANEL - Abnormal       Result Value    Sodium 134 (*)     Potassium 3.7      Chloride 98      Carbon Dioxide (CO2) 26      Anion Gap 10      Urea Nitrogen 11.1      Creatinine 0.77      GFR Estimate 76      Calcium 8.7 (*)     Glucose 113 (*)    CBC WITH PLATELETS AND DIFFERENTIAL - Abnormal    WBC Count 13.3 (*)     RBC Count 4.44      Hemoglobin 13.5      Hematocrit 40.3      MCV 91      MCH 30.4      MCHC 33.5      RDW 13.2      Platelet Count 185      % Neutrophils 76      % Lymphocytes 12      % Monocytes 11      % Eosinophils 1      % Basophils 0      % Immature Granulocytes 0      NRBCs per 100 WBC 0      Absolute Neutrophils 10.1 (*)     Absolute Lymphocytes 1.6      Absolute Monocytes 1.5 (*)     Absolute Eosinophils 0.1      Absolute Basophils 0.0      Absolute Immature Granulocytes 0.1      Absolute NRBCs 0.0     D DIMER QUANTITATIVE - Abnormal    D-Dimer Quantitative 1.01 (*)    ISTAT GASES LACTATE VENOUS POCT - Abnormal    Lactic Acid POCT 0.6      Bicarbonate Venous POCT 28      O2 Sat, Venous POCT 42 (*)     pCO2 Venous POCT 46      pH Venous POCT 7.38      pO2 Venous POCT 24 (*)    INR - Normal    INR 1.07     TROPONIN T, HIGH SENSITIVITY - Normal    Troponin T, High Sensitivity 12     NT PROBNP INPATIENT - Normal    N terminal Pro BNP Inpatient 421     INFLUENZA A/B, RSV, & SARS-COV2 PCR - Normal    Influenza A PCR Negative      Influenza B PCR Negative      RSV PCR Negative      SARS CoV2 PCR Negative          CT Chest Pulmonary Embolism w Contrast   Final Result   IMPRESSION:   1.  No pulmonary embolism demonstrated.   2.  New 8 mm nodule vs. nodular infiltrate.      Recommendations for an incidental lung nodule  average diameter > or =   8mm:     Low risk patients: Consider follow-up CT in 3 months, PET/CT, and/or   tissue sampling. Of those are recommended 3 month follow-up.     High risk patients: Same as for low risk patients.      *Low Risk: Minimal or absent history of smoking or other known risk   factors.   *Nonsolid (groundglass) or partly solid nodules may require longer   follow-up to exclude indolent adenocarcinoma.   *Recommendations based on Guidelines for the Management of Incidental   Pulmonary Nodules Detected at CT: From the Fleischner Society 2017,   Radiology 2017.         MARA MEDRANO MD            SYSTEM ID:  [id number]      XR Chest 2 Views   Final Result   IMPRESSION: Similar mild elevation of the left hemidiaphragm and   adjacent atelectasis.  New faint left upper and midlung opacities   could be infectious/inflammatory. No pleural effusion or pneumothorax.   Similar enlarged cardiomediastinal silhouette.      HI JACOBS MD            SYSTEM ID:  [id number]          Treatments provided: See MAR, labs and radiology results. O2 by nasal cannula  Family Comments: son is present  OBS brochure/video discussed/provided to patient:  Yes  ED Medications:   Medications   sodium chloride (PF) 0.9% PF flush 100 mL (80 mLs Intravenous $Given 1/19/24 1428)   iopamidol (ISOVUE-370) solution 500 mL (61 mLs Intravenous $Given 1/19/24 1428)   cefTRIAXone (ROCEPHIN) 2 g vial to attach to  ml bag for ADULTS or NS 50 ml bag for PEDS (0 g Intravenous Stopped 1/19/24 1619)   doxycycline hyclate (VIBRAMYCIN) capsule 100 mg (100 mg Oral $Given 1/19/24 1539)       Drips infusing:  No  For the majority of the shift this patient was Green.   Interventions performed were N/A.    Sepsis treatment initiated: No    Cares/treatment/interventions/medications to be completed following ED care: following hospitalist orders    ED Nurse Name: Viridiana Romero RN  5:38 PM   RECEIVING UNIT ED HANDOFF REVIEW    Above ED  Nurse Handoff Report was reviewed: Yes  Reviewed by: Shira Weathers RN on January 19, 2024 at 8:01 PM

## 2024-01-19 NOTE — DISCHARGE INSTRUCTIONS
Discharge Instructions  Bronchitis, Pneumonia, Bronchospasm    You were seen today for a chest infection or inflammation. If your provider decided this was due to a bacterial infection, you may need an antibiotic. Sometimes these are caused by a virus, and then an antibiotic will not help.     Generally, every Emergency Department visit should have a follow-up clinic visit with either a primary or a specialty clinic/provider. Please follow-up as instructed by your emergency provider today.    Return to the Emergency Department if:  Your breathing gets much worse.  You are very weak, or feel much more ill.  You develop new symptoms, such as chest pain.  You cough up blood.  You are vomiting (throwing up) enough that you cannot keep fluids or your medicine down.    What can I do to help myself?  Fill any prescriptions the provider gave you and take them right away--especially antibiotics. Be sure to finish the whole antibiotic prescription.  You may be given a prescription for an inhaler, which can help loosen tight air passages.  Use this as needed, but not more often than directed. Inhalers work much better when used with a spacer.   You may be given a prescription for a steroid to reduce inflammation. Used long-term, these can have side effects, but for short-term use they are safe. You may notice restlessness or increased appetite.      You may use non-prescription cough or cold medicines. Cough medicines may help, but don t make the cough go away completely.   Avoid smoke, because this can make your symptoms worse. If you smoke, this may be a good time to quit! Consider using nicotine lozenges, gum, or patches to reduce cravings.   If you have a fever, Tylenol  (acetaminophen), Motrin  (ibuprofen), or Advil  (ibuprofen) may help bring fever down and may help you feel more comfortable. Be sure to read and follow the package directions, and ask your provider if you have questions.  Be sure to get your flu shot  each year.  For certain ages, the pneumonia shot can help prevent pneumonia.  If you were given a prescription for medicine here today, be sure to read all of the information (including the package insert) that comes with your prescription.  This will include important information about the medicine, its side effects, and any warnings that you need to know about.  The pharmacist who fills the prescription can provide more information and answer questions you may have about the medicine.  If you have questions or concerns that the pharmacist cannot address, please call or return to the Emergency Department.     Remember that you can always come back to the Emergency Department if you are not able to see your regular provider in the amount of time listed above, if you get any new symptoms, or if there is anything that worries you.

## 2024-01-19 NOTE — ED TRIAGE NOTES
Short of breath with room air sats 88-91 percent Has been short of breath per patient 'for weeks'. Pt denies that shortness of breath is worsening. Pt with frequent loose cough at arrival. Room air sats here 92%.    Pt has history of pneumonias. Does have an albuterol inhaler and believed to have a history of asthma. Pt does have dementia, but is alert and oriented to person and place.      Lives with son and grand daughter in a home Has a home health nurse.

## 2024-01-19 NOTE — ED PROVIDER NOTES
History     Chief Complaint:  Cough and Shortness of Breath       The history is provided by the patient and a relative.      Keisha Kolb is a 83 year old female with history of breast cancer and asthma who presents to the ED with shortness of breath and coughing. Patient is not anticoagulated. Patient reports having worsening shortness of breath for the last few days. Shortness of breath has been an ongoing issue and is on O2 at home. Patient was brought in today when her nurse measured her O2 in the low 90s. Denies exacerbating factors for shortness of breath such as walking up stairs. Additionally endorses coughing, but this has not been worsening. Son reports patient's symptoms in the past have been worse than today. Patient had RSV 3 months ago and has gotten Covid/Flu vaccine a couple months ago. Patient currently lives at home with son and granddaughter, who is an adult. Son states patient had a fall 3 weeks ago and hither head. Denies vomiting, diarrhea, leg swelling, chest pain, and fever. Denies blood clot in legs/lungs, smoking, and drinking.     Independent Historian:   Son - They report as noted above.    Review of External Notes:   See MDM     Medications:    Tylenol  Albuterol inhaler  Allegra  Lorazepam  Simvastatin    Past Medical History:    Anxiety   Squamous cell carcinoma of skin  Ductal carcinoma in situ of left breast  Pure hypercholesterolemia  Cellulitis of right lower extremity  Disorder of bone and cartilage  Pneumonia  Acute respiratory failure with hypoxia  Asthma    Past Surgical History:    Mastectomy   Nose surgery   Tubal ligation  Cataract removal, bilateral  Breast biopsy  Mohs surgery   Skin biopsy    Physical Exam   Patient Vitals for the past 24 hrs:   BP Temp Temp src Pulse Resp SpO2 Height Weight   01/19/24 1510 -- -- -- -- -- 94 % -- --   01/19/24 1509 -- -- -- -- -- 95 % -- --   01/19/24 1500 (!) 144/72 -- -- 81 -- 97 % -- --   01/19/24 1439 -- -- -- -- -- 97 % --  "--   01/19/24 1400 125/85 -- -- 77 -- 95 % -- --   01/19/24 1357 135/62 -- -- 78 -- 95 % -- --   01/19/24 1141 (!) 143/78 98.6  F (37  C) Oral 80 16 92 % 1.626 m (5' 4\") 78 kg (172 lb)        Physical Exam  Constitutional: Well developed, nontox appearance  Head: Atraumatic.   Neck:  no stridor  Eyes: no scleral icterus  Cardiovascular: RRR, 2+ bilat radial pulses  Pulmonary/Chest: nml resp effort, mildly coarse breath sounds bilaterally, no wheezing  Abdominal: ND, soft, NT, no rebound or guarding   Ext: Warm, well perfused, no edema  Neurological: A&O, symmetric facies, moves ext x4  Skin: Skin is warm and dry.   Psychiatric: Behavior is normal. Thought content normal.   Nursing note and vitals reviewed.    Emergency Department Course   Imaging:  CT Chest Pulmonary Embolism w Contrast   Final Result   IMPRESSION:   1.  No pulmonary embolism demonstrated.   2.  New 8 mm nodule vs. nodular infiltrate.      Recommendations for an incidental lung nodule average diameter > or =   8mm:     Low risk patients: Consider follow-up CT in 3 months, PET/CT, and/or   tissue sampling. Of those are recommended 3 month follow-up.     High risk patients: Same as for low risk patients.      *Low Risk: Minimal or absent history of smoking or other known risk   factors.   *Nonsolid (groundglass) or partly solid nodules may require longer   follow-up to exclude indolent adenocarcinoma.   *Recommendations based on Guidelines for the Management of Incidental   Pulmonary Nodules Detected at CT: From the Fleischner Society 2017,   Radiology 2017.         MARA MEDRANO MD            SYSTEM ID:  [id number]      XR Chest 2 Views   Final Result   IMPRESSION: Similar mild elevation of the left hemidiaphragm and   adjacent atelectasis.  New faint left upper and midlung opacities   could be infectious/inflammatory. No pleural effusion or pneumothorax.   Similar enlarged cardiomediastinal silhouette.      HI JACOBS MD            SYSTEM " ID:  [id number]         Laboratory:  Labs Ordered and Resulted from Time of ED Arrival to Time of ED Departure   BASIC METABOLIC PANEL - Abnormal       Result Value    Sodium 134 (*)     Potassium 3.7      Chloride 98      Carbon Dioxide (CO2) 26      Anion Gap 10      Urea Nitrogen 11.1      Creatinine 0.77      GFR Estimate 76      Calcium 8.7 (*)     Glucose 113 (*)    CBC WITH PLATELETS AND DIFFERENTIAL - Abnormal    WBC Count 13.3 (*)     RBC Count 4.44      Hemoglobin 13.5      Hematocrit 40.3      MCV 91      MCH 30.4      MCHC 33.5      RDW 13.2      Platelet Count 185      % Neutrophils 76      % Lymphocytes 12      % Monocytes 11      % Eosinophils 1      % Basophils 0      % Immature Granulocytes 0      NRBCs per 100 WBC 0      Absolute Neutrophils 10.1 (*)     Absolute Lymphocytes 1.6      Absolute Monocytes 1.5 (*)     Absolute Eosinophils 0.1      Absolute Basophils 0.0      Absolute Immature Granulocytes 0.1      Absolute NRBCs 0.0     D DIMER QUANTITATIVE - Abnormal    D-Dimer Quantitative 1.01 (*)    ISTAT GASES LACTATE VENOUS POCT - Abnormal    Lactic Acid POCT 0.6      Bicarbonate Venous POCT 28      O2 Sat, Venous POCT 42 (*)     pCO2 Venous POCT 46      pH Venous POCT 7.38      pO2 Venous POCT 24 (*)    INR - Normal    INR 1.07     TROPONIN T, HIGH SENSITIVITY - Normal    Troponin T, High Sensitivity 12     NT PROBNP INPATIENT - Normal    N terminal Pro BNP Inpatient 421     INFLUENZA A/B, RSV, & SARS-COV2 PCR - Normal    Influenza A PCR Negative      Influenza B PCR Negative      RSV PCR Negative      SARS CoV2 PCR Negative       Emergency Department Course & Assessments:    Interventions:  Medications   cefTRIAXone (ROCEPHIN) 2 g vial to attach to  ml bag for ADULTS or NS 50 ml bag for PEDS (has no administration in time range)   doxycycline hyclate (VIBRAMYCIN) capsule 100 mg (has no administration in time range)   sodium chloride (PF) 0.9% PF flush 100 mL (80 mLs Intravenous $Given  24 1428)   iopamidol (ISOVUE-370) solution 500 mL (61 mLs Intravenous $Given 24 142)        Assessments:  1205 I obtained the history and examined the patient as noted above.    Independent Interpretation (X-rays, CTs, rhythm strip):  See MDM    Consultations/Discussion of Management or Tests:  None        Social Determinants of Health affecting care:   See MDM    Disposition:  The patient was discharged to home.     Impression & Plan    CMS Diagnoses: None    Medical Decision Makin year old female presenting w/ worsening shortness of breath     Social determinants affecting patient's health include: Age increasing risk for presentation to the emergency department     I [facility], ED visit on 1/10/2024     DDx includes viral syndrome NOS, influenza-like illness, influenza, COVID-19, bacterial pneumonia, PE.  Labs significant for leukocytosis not significantly changed from previous, minimally elevated D-dimer, COVID-19 and influenza negative, minimal hyponatremia.  Imaging sig for findings concerning for pneumonia, no pneumothoraces on my independent interpretation.  Given leukocytosis and worsening cough and shortness of breath, I think would be reasonable to treat the patient for community-acquired pneumonia.  Antibiotics given as noted above initially with question of admission versus outpatient management.  Prescriptions provided as noted below.  The patient remained normoxic in the emergency department.  At this time I feel the pt is safe for discharge.  Recommendations given regarding follow up with PCP and return to the emergency department as needed for new or worsening symptoms.  Patient and son counseled on all results, disposition and diagnosis.  They are understanding and agreeable to plan. Patient discharged in stable condition.    1700: Patient attempted to discharge moving to a wheelchair.  She was too weak to get up.  The patient was  subsequently admitted to the hospitalist service under Dr. Daigle for further evaluation and management    Diagnosis:    ICD-10-CM    1. Community acquired pneumonia of left upper lobe of lung  J18.9          Discharge Medications:  New Prescriptions    DOXYCYCLINE HYCLATE (VIBRAMYCIN) 100 MG CAPSULE    Take 1 capsule (100 mg) by mouth 2 times daily for 13 doses     Scribe Disclosure:  I, Zane Harrington, am serving as a scribe at 12:23 PM on 1/19/2024 to document services personally performed by Jorge Arciniega MD based on my observations and the provider's statements to me.     1/19/2024   Jorge Arciniega MD Vaughn, Christopher E, MD  01/19/24 1532       Jorge Arciniega MD  01/19/24 1710

## 2024-01-20 ENCOUNTER — APPOINTMENT (OUTPATIENT)
Dept: OCCUPATIONAL THERAPY | Facility: CLINIC | Age: 84
DRG: 193 | End: 2024-01-20
Attending: STUDENT IN AN ORGANIZED HEALTH CARE EDUCATION/TRAINING PROGRAM
Payer: COMMERCIAL

## 2024-01-20 LAB
ANION GAP SERPL CALCULATED.3IONS-SCNC: 12 MMOL/L (ref 7–15)
BUN SERPL-MCNC: 8.7 MG/DL (ref 8–23)
CALCIUM SERPL-MCNC: 8.7 MG/DL (ref 8.8–10.2)
CHLORIDE SERPL-SCNC: 98 MMOL/L (ref 98–107)
CREAT SERPL-MCNC: 0.59 MG/DL (ref 0.51–0.95)
CREAT SERPL-MCNC: 0.65 MG/DL (ref 0.51–0.95)
DEPRECATED HCO3 PLAS-SCNC: 25 MMOL/L (ref 22–29)
EGFRCR SERPLBLD CKD-EPI 2021: 87 ML/MIN/1.73M2
EGFRCR SERPLBLD CKD-EPI 2021: 89 ML/MIN/1.73M2
ERYTHROCYTE [DISTWIDTH] IN BLOOD BY AUTOMATED COUNT: 13 % (ref 10–15)
GLUCOSE SERPL-MCNC: 105 MG/DL (ref 70–99)
HCT VFR BLD AUTO: 40.3 % (ref 35–47)
HGB BLD-MCNC: 13.4 G/DL (ref 11.7–15.7)
MCH RBC QN AUTO: 30.1 PG (ref 26.5–33)
MCHC RBC AUTO-ENTMCNC: 33.3 G/DL (ref 31.5–36.5)
MCV RBC AUTO: 91 FL (ref 78–100)
PLATELET # BLD AUTO: 150 10E3/UL (ref 150–450)
POTASSIUM SERPL-SCNC: 3.7 MMOL/L (ref 3.4–5.3)
RBC # BLD AUTO: 4.45 10E6/UL (ref 3.8–5.2)
SODIUM SERPL-SCNC: 135 MMOL/L (ref 135–145)
WBC # BLD AUTO: 11.1 10E3/UL (ref 4–11)

## 2024-01-20 PROCEDURE — 80048 BASIC METABOLIC PNL TOTAL CA: CPT | Performed by: STUDENT IN AN ORGANIZED HEALTH CARE EDUCATION/TRAINING PROGRAM

## 2024-01-20 PROCEDURE — 250N000013 HC RX MED GY IP 250 OP 250 PS 637: Performed by: INTERNAL MEDICINE

## 2024-01-20 PROCEDURE — 250N000011 HC RX IP 250 OP 636: Performed by: INTERNAL MEDICINE

## 2024-01-20 PROCEDURE — 250N000011 HC RX IP 250 OP 636: Performed by: STUDENT IN AN ORGANIZED HEALTH CARE EDUCATION/TRAINING PROGRAM

## 2024-01-20 PROCEDURE — G0378 HOSPITAL OBSERVATION PER HR: HCPCS

## 2024-01-20 PROCEDURE — 85041 AUTOMATED RBC COUNT: CPT | Performed by: STUDENT IN AN ORGANIZED HEALTH CARE EDUCATION/TRAINING PROGRAM

## 2024-01-20 PROCEDURE — 97166 OT EVAL MOD COMPLEX 45 MIN: CPT | Mod: GO | Performed by: OCCUPATIONAL THERAPIST

## 2024-01-20 PROCEDURE — 250N000013 HC RX MED GY IP 250 OP 250 PS 637: Performed by: STUDENT IN AN ORGANIZED HEALTH CARE EDUCATION/TRAINING PROGRAM

## 2024-01-20 PROCEDURE — 94640 AIRWAY INHALATION TREATMENT: CPT

## 2024-01-20 PROCEDURE — 250N000009 HC RX 250: Performed by: INTERNAL MEDICINE

## 2024-01-20 PROCEDURE — 96372 THER/PROPH/DIAG INJ SC/IM: CPT | Performed by: INTERNAL MEDICINE

## 2024-01-20 PROCEDURE — 120N000001 HC R&B MED SURG/OB

## 2024-01-20 PROCEDURE — 82565 ASSAY OF CREATININE: CPT | Performed by: INTERNAL MEDICINE

## 2024-01-20 PROCEDURE — 36415 COLL VENOUS BLD VENIPUNCTURE: CPT | Performed by: STUDENT IN AN ORGANIZED HEALTH CARE EDUCATION/TRAINING PROGRAM

## 2024-01-20 PROCEDURE — 36415 COLL VENOUS BLD VENIPUNCTURE: CPT | Performed by: INTERNAL MEDICINE

## 2024-01-20 PROCEDURE — 99233 SBSQ HOSP IP/OBS HIGH 50: CPT | Performed by: INTERNAL MEDICINE

## 2024-01-20 PROCEDURE — 96376 TX/PRO/DX INJ SAME DRUG ADON: CPT

## 2024-01-20 PROCEDURE — 97535 SELF CARE MNGMENT TRAINING: CPT | Mod: GO | Performed by: OCCUPATIONAL THERAPIST

## 2024-01-20 RX ORDER — GUAIFENESIN 600 MG/1
600 TABLET, EXTENDED RELEASE ORAL 2 TIMES DAILY
Status: DISCONTINUED | OUTPATIENT
Start: 2024-01-20 | End: 2024-01-22 | Stop reason: HOSPADM

## 2024-01-20 RX ORDER — IPRATROPIUM BROMIDE AND ALBUTEROL SULFATE 2.5; .5 MG/3ML; MG/3ML
3 SOLUTION RESPIRATORY (INHALATION)
Status: DISCONTINUED | OUTPATIENT
Start: 2024-01-20 | End: 2024-01-21

## 2024-01-20 RX ORDER — ENOXAPARIN SODIUM 100 MG/ML
40 INJECTION SUBCUTANEOUS EVERY 24 HOURS
Status: DISCONTINUED | OUTPATIENT
Start: 2024-01-20 | End: 2024-01-22 | Stop reason: HOSPADM

## 2024-01-20 RX ADMIN — DOXYCYCLINE HYCLATE 100 MG: 100 CAPSULE ORAL at 20:28

## 2024-01-20 RX ADMIN — CEFTRIAXONE 2 G: 2 INJECTION, POWDER, FOR SOLUTION INTRAMUSCULAR; INTRAVENOUS at 14:43

## 2024-01-20 RX ADMIN — GUAIFENESIN 600 MG: 600 TABLET, EXTENDED RELEASE ORAL at 20:28

## 2024-01-20 RX ADMIN — ENOXAPARIN SODIUM 40 MG: 40 INJECTION SUBCUTANEOUS at 13:39

## 2024-01-20 RX ADMIN — GUAIFENESIN 600 MG: 600 TABLET, EXTENDED RELEASE ORAL at 09:56

## 2024-01-20 RX ADMIN — DOXYCYCLINE HYCLATE 100 MG: 100 CAPSULE ORAL at 08:20

## 2024-01-20 RX ADMIN — CITALOPRAM HYDROBROMIDE 40 MG: 20 TABLET ORAL at 21:34

## 2024-01-20 RX ADMIN — IPRATROPIUM BROMIDE AND ALBUTEROL SULFATE 3 ML: .5; 3 SOLUTION RESPIRATORY (INHALATION) at 22:30

## 2024-01-20 RX ADMIN — IPRATROPIUM BROMIDE AND ALBUTEROL SULFATE 3 ML: .5; 3 SOLUTION RESPIRATORY (INHALATION) at 13:39

## 2024-01-20 RX ADMIN — IPRATROPIUM BROMIDE AND ALBUTEROL SULFATE 3 ML: .5; 3 SOLUTION RESPIRATORY (INHALATION) at 16:56

## 2024-01-20 ASSESSMENT — ACTIVITIES OF DAILY LIVING (ADL)
ADLS_ACUITY_SCORE: 30
ADLS_ACUITY_SCORE: 30
ADLS_ACUITY_SCORE: 22
ADLS_ACUITY_SCORE: 31
ADLS_ACUITY_SCORE: 30
ADLS_ACUITY_SCORE: 31
ADLS_ACUITY_SCORE: 22
ADLS_ACUITY_SCORE: 31
ADLS_ACUITY_SCORE: 31

## 2024-01-20 NOTE — PROGRESS NOTES
"/63 (BP Location: Right arm)   Pulse 83   Temp 97.8  F (36.6  C) (Oral)   Resp 16   Ht 1.626 m (5' 4\")   Wt 76.7 kg (169 lb 1.5 oz)   SpO2 96%   BMI 29.02 kg/m       PRIMARY DIAGNOSIS: Pneumonia    OUTPATIENT/OBSERVATION GOALS TO BE MET BEFORE DISCHARGE  1. Orthostatic performed: N/A    2. Tolerating PO medications: Yes    3. Return to near baseline physical activity: No    4. Cleared for discharge by consultants (if involved): No    Discharge Planner Nurse   Safe discharge environment identified: Yes  Barriers to discharge: Yes       Entered by: Ajay Gonzalez RN 01/20/2024 4:03 PM   Pt is alert and oriented x 4, denies pain, afebrile, ambulated with assist of one in bedroom, up to chair for 3 hours, voiding spontaneously, lungs diminished but clear, mild shortness of breath, on 1L oxygen per nasal cannula, infrequent non-productive cough, on Mucinex and nebulizers, IV Rocephin and oral doxycycline administered per orders. Vitals stable for patient, continue plan of care.  Please review provider order for any additional goals.   Nurse to notify provider when observation goals have been met and patient is ready for discharge.  "

## 2024-01-20 NOTE — PROGRESS NOTES
[facility]    Medicine Progress Note - Hospitalist Service    Date of Admission:  1/19/2024    Primary Care Physician   MALAIKA BOLDEN  CONSULTANTS: none     Assessment & Plan   Keisha Kolb is a 83 year old female with past medical history significant for squamous cell carcinoma of the skin, breast cancer, reactive airway disease since having covid (not on oxygen at home), recent acute hypoxic respiratory failure due to RSV infection (11/2023), hyperlipidemia, chronic thrombocytopenia, and anxiety who presented to [facility] on 1/19/2024 with 1 week history of shortness of breath and cough and was found to have community acquired pneumonia. Patient registered to observation for generalized weakness due ot her current illness.     Generalized Weakness, failure to thrive  Patient unfortunately very weak and unable to demonstrate home safety during evaluation in emergency department. Suspect due to acute illness with her pneumonia. Registering patient to observation and will obtain PT/OT in morning.  Patient resides in a home with her granddaughter, her granddaughter's  and family.  Does have sick stairs to contend with to get to her living quarters.       Community-acquired vs Atypical Pneumonia, acute hypoxic respiratory failure,  Reactive Airway Disease, Pulmonary Nodule  Presented with cough, shortness of breath, and oxygen saturations in low-90s per home nursing. Left upper and midlung opacities noted on CXR. CTA negative for a pulmonary embolus with BA nodule vs nodular infiltrate. Incidentally discovered 8mm nodule of left upper lobe. Described in CT chest as nodule vs nodular infiltrate. Given presentation for pneumonia, could consider atypical infection cause if symptoms do not improve with antibiotics. Also with bronchial wall thickening noted on CT, as well as history of reactive airway disease.  Patient has a history of reactive airway disease  following her COVID infection and recently had RSV.  She is on inhalers at home.  Continue on Flovent and will have her on scheduled nebs.  Patient is on IV Rocephin which I will continue for another day as well as her doxycycline.  She does not carry diagnosis of asthma, COPD, and is not on chronic oxygen at home this was confirmed with the patient's granddaughter whom she lives with.  Will add Mucinex to the patient's regimen to help with her secretions.  She currently is on oxygen we will try and wean this down.  With regard to the patient's lung nodule, had a discussion with the patient's granddaughter to make sure she was aware of the findings.  At this point the goal would not to be to work this up as they would not want biopsies or chemotherapy going forward.  They will make sure the patient continues to be comfortable and not worry.        Leukocytosis: WBC 13.3; likely due to her pneumonia vs stress demargination.    Hyperlipidemia: patient is on a statin at home.  At this point the risk far outweighs the benefit so we will be stopping this medication..     Chronic Thrombocytopenia: Hx of prior thrombocytopenia. Now within normal limits.    Anxiety: Continue PTA citalopram.  Patient does take Ativan at home as needed which she has been on for decades.  Given the patient's age and confusion at home I do not think this is an appropriate medication for so we should be stopping it.  This was discussed with the family as well    Vascular dementia:  Per the patient's family the patient has had multiple mini strokes and has had confusion related to this.  She is unable to do her own ADLs and lives with her granddaughter who does most of her ADLs for her.  Goal is for her to be in a safe environment and hopefully not have to go to a nursing home.  Want to try and limit the amount of intrusions we have within her day.  Need to keep her window shades up during the day and allow her to sleep at night.  Try and limit  the number of vital signs we do and pokes.  Is at risk for sundowning.  Also need to because intent of tethers which put her at a higher risk of falling these would include oxygen tubing as well as pneumoboots.    Advance care planning: Had a short discussion with the patient's family who states the patient has a healthcare directive and states that she is DNR/I.  This is different than what is been ordered within [facility] but the family is clear and their wishes, this was discussed with Annika the patient's granddaughter.  Family needs to bring in the HCD to have scanned into epic.     Remote left Breast Cancer, hs of SCC of Skin          4Ms:  Polypharmacy: Medications reviewed.  Given the patient's memory issues, confusion, and frailty at this point I think stopping her Ativan and her statin are appropriate as the risk outweighs the benefit  Mentation:  Capacity questionable, patient relies on her family.  She has had sundowning at the hospital.  She resides with her granddaughter in a house and has her ADLs done by her  Social support: Patient resides with her granddaughter Annika and her .  Annika does her ADLs for her except for dressing which the patient can do when she is healthy but her medications are set up by family.  Mobility: Uses a 4 wheeled walker  What is importmant: To try stay as independent as possible at home with the goal of trying not to go to a nursing home if possible but the family understands as her dementia gets worse this may be required in the future      Discussed plan of care with bedside nursing, social work, therapy, also discussed with Annika who is the patient's granddaughter whom she lives with is listed as daughter in epic which is incorrect    Given her hypoxia she meets inpatient criteria, will change    Diet: Regular Diet Adult    DVT Prophylaxis: Enoxaparin (Lovenox) SQ  Izaguirre Catheter: Not present  Lines: None     Cardiac Monitoring: None    RESTRAINTS: Not indicated  Code  "Status: DNR/I,         Follow up plan: Will follow-up with her primary care provider after discharge    Total time spent today in coordination of care, discussions with family as well as the care team was 50 minutes.    This document was created using voice recognition technology.  Please excuse any typographical errors that may have occurred.  Please call with any questions.       Clinically Significant Risk Factors Present on Admission                       # Overweight: Estimated body mass index is 29.02 kg/m  as calculated from the following:    Height as of this encounter: 1.626 m (5' 4\").    Weight as of this encounter: 76.7 kg (169 lb 1.5 oz).              Disposition Plan      Expected Discharge Date: 01/20/2024      Destination: home with family     will be able to go home when she is able to help with her own ADLs.  The goal is to try and keep her from having to go to a TCU and return home with her granddaughter.  This likely is going to take a day or 2.       PCP: [facility] in [place]  Barrier to discharge: Weakness, hypoxia, or pneumonia    Isiah Ray MD  Hospitalist Service  [facility]  Securely message with [employer] (more info)  Text page via [facility] Paging/Directory   ______________________________________________________________________    Interval History   Patient overnight continues to be weak.  Per the family the patient has had increased confusion while being hospitalized.  Patient was talking garbled speech overnight per the family but is doing better this morning.  Patient is weak and continues to feel ill.  She has got a wet cough without any significant sputum production but she cannot get it out at this point.  At home she is not on oxygen per the family.  Currently is wearing nasal cannula and the goal is to wean this to room air.  Does have some mild shortness of breath      ROS: A comprehensive review of systems was negative except for items noted in the HPI.  " Patient currently denies any fever, chills, sweats, nausea, vomiting, diarrhea, or chest pain.       Physical Exam   Vital Signs: Temp: 98  F (36.7  C) Temp src: Oral BP: (!) 153/83 Pulse: 76   Resp: 16 SpO2: 92 % O2 Device: Nasal cannula Oxygen Delivery: 1 LPM  Weight: 169 lbs 1.49 oz      General appearance: Patient is alert and oriented x2, somewhat confused, lying flat in bed, appears ill, pleasant and conversing normally, speaking in full sentences, appears stated age and is frail  HEENT:    Mucous membranes are moist  RESPIRATORY: Patient with some rhonchi with a wet cough, no significant wheezing currently, moderate air movement  CARDIOVASCULAR: Regular rate and rhythm, normal S1/S2, no murmurs   GASTROINTESTINAL: Non-distended, non-tender, soft, bowel sounds present throughout  NEUROLOGIC:  Cranial nerves II-XII intact, without any focal deficits, strength 5/5 throughout, globally weak  EXTREMITIES:  Moves all extremities, no clubbing, cyanosis, nor edema  :  Izaguirre not present         Data     I have personally reviewed the following data over the past 24 hrs:    11.1 (H)  \   13.4   / 150     135 98 8.7 /  105 (H)   3.7 25 0.65 \     Trop: 12 BNP: 421     Procal: N/A CRP: N/A Lactic Acid: 0.6       INR:  1.07 PTT:  N/A   D-dimer:  1.01 (H) Fibrinogen:  N/A       Imaging:   Results for orders placed or performed during the hospital encounter of 01/19/24   XR Chest 2 Views    Narrative    CHEST TWO VIEWS  1/19/2024 1:51 PM     HISTORY:  Cough, shortness of breath.    COMPARISON: Chest radiograph 11/24/2023.      Impression    IMPRESSION: Similar mild elevation of the left hemidiaphragm and  adjacent atelectasis.  New faint left upper and midlung opacities  could be infectious/inflammatory. No pleural effusion or pneumothorax.  Similar enlarged cardiomediastinal silhouette.    HI JACOBS MD         SYSTEM ID:  [id number]   CT Chest Pulmonary Embolism w Contrast    Narrative    CT CHEST PULMONARY  EMBOLISM WITH CONTRAST 1/19/2024 2:40 PM    CLINICAL HISTORY: Chest pain. Elevated D-dimer, dyspnea.    TECHNIQUE: CT angiogram chest during arterial phase injection IV  contrast. 2D and 3D MIP reconstructions were performed by the CT  technologist. Dose reduction techniques were used.   CONTRAST: 61 mL Isovue-370    COMPARISON: October 24, 2022    FINDINGS:  ANGIOGRAM CHEST: Pulmonary arteries are normal caliber and negative  for pulmonary emboli. Thoracic aorta is negative for dissection. No CT  evidence of right heart strain.    LUNGS AND PLEURA: New 8 mm nodule vs. nodular infiltrate in the  central left upper lobe image 117 series 7. Minimal dependent  atelectasis vs. less likely infiltrate. Mild to moderate bronchial  wall thickening. No effusions.    MEDIASTINUM/AXILLAE: Mildly prominent subcarinal node similar to  previous. No aneurysm.    CORONARY ARTERY CALCIFICATIONS: None.    UPPER ABDOMEN: No acute findings.    MUSCULOSKELETAL: No frankly destructive bony lesions.      Impression    IMPRESSION:  1.  No pulmonary embolism demonstrated.  2.  New 8 mm nodule vs. nodular infiltrate.    Recommendations for an incidental lung nodule average diameter > or =  8mm:    Low risk patients: Consider follow-up CT in 3 months, PET/CT, and/or  tissue sampling. Of those are recommended 3 month follow-up.    High risk patients: Same as for low risk patients.    *Low Risk: Minimal or absent history of smoking or other known risk  factors.  *Nonsolid (groundglass) or partly solid nodules may require longer  follow-up to exclude indolent adenocarcinoma.  *Recommendations based on Guidelines for the Management of Incidental  Pulmonary Nodules Detected at CT: From the Fleischner Society 2017,  Radiology 2017.       MARA MEDRANO MD         SYSTEM ID:  [id number]     Procedures: None  I have personally have reviewed the patient's most up to date radiologic exams, EKG, labs, orders, and medications myself

## 2024-01-20 NOTE — PROGRESS NOTES
"PRIMARY DIAGNOSIS: PNEUMONIA  OUTPATIENT/OBSERVATION GOALS TO BE MET BEFORE DISCHARGE:  ADLs back to baseline: No    Activity and level of assistance: bedrest r/t weakness    Pain status: Pain free.    Return to near baseline physical activity: No     Discharge Planner Nurse   Safe discharge environment identified: Yes  Barriers to discharge: Yes       Entered by: Ajay Gonzalez RN 01/20/2024 8:21 AM   BP (!) 153/83 (BP Location: Right arm, Patient Position: Semi-Capone's, Cuff Size: Adult Regular)   Pulse 76   Temp 98  F (36.7  C) (Oral)   Resp 16   Ht 1.626 m (5' 4\")   Wt 76.7 kg (169 lb 1.5 oz)   SpO2 92%   BMI 29.02 kg/m     Please review provider order for any additional goals.   Nurse to notify provider when observation goals have been met and patient is ready for discharge.  "

## 2024-01-20 NOTE — PROGRESS NOTES
PRIMARY DIAGNOSIS: Pneumonia     OUTPATIENT/OBSERVATION GOALS TO BE MET BEFORE DISCHARGE  1. Orthostatic performed: N/A     2. Tolerating PO medications: Yes     3. Return to near baseline physical activity: No     4. Cleared for discharge by consultants (if involved): No        Discharge Planner Nurse   Safe discharge environment identified: Yes  Barriers to discharge: Yes   Entered by: Ajay Gonzalez RN 01/20/2024     Pt is alert and oriented x 4, denies pain, afebrile, up to chair, mild shortness of breath, on 1L oxygen per nasal cannula. Vitals stable for patient, tolerating oral diet, continue plan of care.  Please review provider order for any additional goals.   Nurse to notify provider when observation goals have been met and patient is ready for discharge.

## 2024-01-20 NOTE — PHARMACY-ADMISSION MEDICATION HISTORY
Pharmacist Admission Medication History    Admission medication history is complete. The information provided in this note is only as accurate as the sources available at the time of the update.    Information Source(s): Family member via in-person    Pertinent Information: none    Changes made to PTA medication list:  Added: None  Deleted: None  Changed: flovent, allegra to prn       Allergies reviewed with patient and updates made in EHR: yes    Medication History Completed By: Dennis Fields [facility] 1/19/2024 8:24 PM    Prior to Admission medications    Medication Sig Last Dose Taking? Auth Provider Long Term End Date   acetaminophen (TYLENOL) 325 MG tablet Take 2 tablets (650 mg) by mouth every 6 hours as needed for mild pain or other (and adjunct with moderate or severe pain or per patient request)  Yes Juliana Solorio PA-C     albuterol (PROAIR HFA/PROVENTIL HFA/VENTOLIN HFA) 108 (90 Base) MCG/ACT inhaler Inhale 2 puffs into the lungs every 6 hours as needed for shortness of breath, wheezing or cough  Patient taking differently: Inhale into the lungs every 6 hours as needed for shortness of breath, wheezing or cough  Yes Nighat Moya MD Yes    CITALOPRAM HYDROBROMIDE PO Take 40 mg by mouth At Bedtime 1/18/2024 at hs Yes Reported, Patient Yes    doxycycline hyclate (VIBRAMYCIN) 100 MG capsule Take 1 capsule (100 mg) by mouth 2 times daily for 13 doses  Yes Jorge Arciniega MD  1/26/24   EPINEPHrine (ANY BX GENERIC EQUIV) 0.3 MG/0.3ML injection 2-pack Inject 0.3 mLs (0.3 mg) into the muscle once as needed for anaphylaxis May repeat one time in 5-15 minutes if response to initial dose is inadequate.  Yes Eddy Love MD     fexofenadine (ALLEGRA) 60 MG tablet Take 60 mg by mouth 2 times daily as needed  Yes Reported, Patient     FLOVENT DISKUS 100 MCG/ACT inhaler 1 puff 2 times daily as needed  Yes Reported, Patient Yes    LORAZEPAM PO Take 0.5 mg by mouth every morning 1/19/2024 at am  Yes Reported, Patient     SIMVASTATIN PO Take 20 mg by mouth At Bedtime 1/18/2024 at hs Yes Reported, Patient Yes    vitamin D3 (CHOLECALCIFEROL) 50 mcg (2000 units) tablet Take 1 tablet by mouth daily 1/19/2024 at am Yes Unknown, Entered By History

## 2024-01-20 NOTE — UTILIZATION REVIEW
Admission Status; Secondary Review Determination       Under the authority of the Utilization Management Committee, the utilization review process indicated a secondary review on the above patient. The review outcome is based on review of the medical records, discussions with staff, and applying clinical experience noted on the date of the review.     (x) Inpatient Status Appropriate - This patient's medical care is consistent with medical management for inpatient care and reasonable inpatient medical practice.     RATIONALE FOR DETERMINATION     Patient requires inpatient admission versus short stay observation or outpatient treatment for the following reasons:  83 year old female with pmh of SCC, breast cancer, reactive airway disease, history of RSV, hld and anxiety who was registered to observation on 1/19 with shortness of breath, pneumonia, and acute hypoxemic respiratory failure. The patient is not utilizing oxygen at home, but has required 2 liters of O2 in the hospital and episodes of SaO2 of 86 to 89 % when she arrived to the ED. Today, she is often only satting at 91-92% even with oxygen at rest. She requires further inpatient treatments including oxygen support, IV abx for upper and midlung opacities,  nebulizers and to ensure she improves. With the hypoxemic respiratory failure, she meets IP admission and should be advanced. Dr. Ray informed.  The expected length of stay at the time of admission was more than 2 nights because of the severity of illness, intensity of service provided, and risk for adverse outcome. Inpatient admission is appropriate.         This document was produced using voice recognition software       The information on this document is developed by the utilization review team in order for the business office to ensure compliance. This only denotes the appropriateness of proper admission status and does not reflect the quality of care rendered.   The definitions of Inpatient  Status and Observation Status used in making the determination above are those provided in the CMS Coverage Manual, Chapter 1 and Chapter 6, section 70.4.   Sincerely,   Julio Shaikh DO  Physician Advisor  [facility].

## 2024-01-20 NOTE — PROGRESS NOTES
PRIMARY DIAGNOSIS:  OUTPATIENT/OBSERVATION GOALS TO BE MET BEFORE DISCHARGE:  ADLs back to baseline: No    Activity and level of assistance: pt on bedrest     Pain status: Pain free.    Return to near baseline physical activity: No     Discharge Planner Nurse   Safe discharge environment identified: No  Barriers to discharge: Yes       Entered by: Annika Howard RN 01/20/2024 12:31 AM     Please review provider order for any additional goals.   Nurse to notify provider when observation goals have been met and patient is ready for discharge.

## 2024-01-20 NOTE — PLAN OF CARE
PRIMARY DIAGNOSIS: PNEUMONIA  OUTPATIENT/OBSERVATION GOALS TO BE MET BEFORE DISCHARGE:  Dyspnea improved and O2 sats >88% on RA or back to baseline O2 levels: No   SpO2: 93 %, O2 Device: Nasal cannula    Tolerating oral abx or appropriate plans made outpatient infusion: No    Vitals signs normal or return to baseline: Yes    Short term supplemental O2 needed with activity at home: Pt using O2 at baseline. Unable to state how many liters.     Tolerate oral intake to maintain hydration: Yes    Return to near baseline physical activity: No    Discharge Planner Nurse   Safe discharge environment identified: No  Barriers to discharge: Yes       Entered by: Annika Howard RN 01/20/2024 6:15 AM     Please review provider order for any additional goals.   Nurse to notify provider when observation goals have been met and patient is ready for discharge.    Goal Outcome Evaluation:      Plan of Care Reviewed With: patient    Overall Patient Progress: no changeOverall Patient Progress: no change     Shift: 11pm-7am    Vitals: Temp: 97.9  F (36.6  C) Temp src: Oral BP: (!) 162/72 Pulse: 79   Resp: 16 SpO2: 93 % O2 Device: Nasal cannula Oxygen Delivery: 1 LPM    Orientation: alert to self   Pain: denies   Activity: pt on bedrest due to weakness   Resp: on 2L O2, wears O2 at baseline   Diet: regular diet   How to take meds: whole w/ fluids    GI & : using purewick, no bm this shift    Skin: abrasion on right elbow   Lines: IV on right- saline locked   Other: comes from home with son and granddaughter, has home health nurse

## 2024-01-20 NOTE — PROGRESS NOTES
01/20/24 0955   Appointment Info   Signing Clinician's Name / Credentials (OT) Tasha Bhat OTR/L   Quick Adds   Quick Adds Certification   Living Environment   People in Home child(may), adult;grandchild(may)   Current Living Arrangements house   Transportation Anticipated family or friend will provide   Living Environment Comments Pt. lives in the upstairs of her home and her dtr. and her family live on the other floor.  She was a tub shower with grab bars, bench, and grab bars for the toilet.   Self-Care   Usual Activity Tolerance good   Equipment Currently Used at Home walker, rolling;grab bar, toilet;grab bar, tub/shower;tub bench   Fall history within last six months yes   Number of times patient has fallen within last six months 1   Activity/Exercise/Self-Care Comment Pt. reports independence with grooming and hygiene, eating, medications, dressing, and toileting.  Pt. has been needing assist with showering, errands, cooking, cleaning, and driving.  Pt. uses a FWW at baseline.  Pt. has 1 x week home PT due to fall and ambulation.   General Information   Onset of Illness/Injury or Date of Surgery 01/19/24   Referring Physician Reji Daigle   Patient/Family Therapy Goal Statement (OT) have increased energy and breath better, open to having additional rehab and support to get back to being independent   Additional Occupational Profile Info/Pertinent History of Current Problem 83 year old female with past medical history significant for squamous cell carcinoma of the skin, breast cancer, reactive airway disease, recent acute hypoxic respiratory failure 2/2 RSV infection (11/2023), hyperlipidemia, chronic thrombocytopenia, and anxiety who presented to [facility] on 1/19/2024 with 1 week history of shortness of breath and cough and was found to have community acquired pneumonia. Patient registered to observation for generalized weakness 2/2 illness.   Existing Precautions/Restrictions oxygen  therapy device and L/min;fall   General Observations and Info Pt. sleeping upon arrival   Cognitive Status Examination   Orientation Status orientation to person, place and time   Affect/Mental Status (Cognitive) anxious;WFL   Memory Deficit minimal deficit   Cognitive Status Comments Pt. notes some forgetfulness and prefers to have people at home with her to be available to help if needed.   Visual Perception   Visual Impairment/Limitations corrective lenses for reading   Pain Assessment   Patient Currently in Pain No   Range of Motion Comprehensive   Comment, General Range of Motion B UE WFL   Strength Comprehensive (MMT)   Comment, General Manual Muscle Testing (MMT) Assessment generalized weakness noted, able to lift B UE against gravity but fatigues quickly and SOB   Coordination   Upper Extremity Coordination No deficits were identified   Bed Mobility   Comment (Bed Mobility) mod A for supine to sit, max A x 2 to scoot up in bed   Transfers   Transfer Comments max A for sit to stand briefly   Activities of Daily Living   BADL Assessment/Intervention   (pt. currently needing assist for LE dressing and UE dressing)   Clinical Impression   Criteria for Skilled Therapeutic Interventions Met (OT) Yes, treatment indicated   OT Diagnosis impaired independence and tolerance for ADLs   OT Problem List-Impairments impacting ADL problems related to;activity tolerance impaired;balance;mobility;strength   Assessment of Occupational Performance 3-5 Performance Deficits   Identified Performance Deficits decreased functional transfers, decreased sitting and standing tolerance, decreased functional ambulation, decreased UE and LE dressing, decreased G/H   Planned Therapy Interventions (OT) ADL retraining;transfer training;progressive activity/exercise;risk factor education   Clinical Decision Making Complexity (OT) detailed assessment/moderate complexity   Risk & Benefits of therapy have been explained evaluation/treatment  results reviewed;care plan/treatment goals reviewed;risks/benefits reviewed;current/potential barriers reviewed;participants voiced agreement with care plan;participants included;patient   OT Total Evaluation Time   OT Eval, Moderate Complexity Minutes (60474) 10   Therapy Certification   Medical Diagnosis CAP, generalized weakness, dyspnea, impaired independence and tolerance for ADLs   Start of Care Date 01/20/24   Certification date from 01/20/24   Certification date to 01/22/24   OT Goals   Therapy Frequency (OT) Daily   OT Predicted Duration/Target Date for Goal Attainment 01/22/24   OT: Hygiene/Grooming supervision/stand-by assist  (from seated position)   OT: Upper Body Dressing Supervision/stand-by assist   OT: Lower Body Dressing Supervision/stand-by assist   OT: Transfer Minimal assist;with assistive device   Self-Care/Home Management   Self-Care/Home Mgmt/ADL, Compensatory, Meal Prep Minutes (40577) 25   Symptoms Noted During/After Treatment (Meal Preparation/Planning Training) fatigue;shortness of breath;significant change in vital signs;increase work of breath   Treatment Detail/Skilled Intervention Pt. sleeping upon arrival but easily awoke to name and agreeable to therapy.  OT educated on role and POC.  Pt. verbalized understanding but unsure how much she can tolerate due to fatigue and feeling SOB.  OT encouraged and educated on role of activity and PLB to assist.  Pt. needing mod A for rolling to side and supine to sit at EOB.  Pt. needing CGA for sitting at EOB and increased time for activity.  Pt. noted increased SOB, fatigue, and O2 sats dropped to 83%.  Pt. needing max A to attempt sit to stand but unable and feeling very SOB.  OT provided mod A for sit to supine.  Pt. unable to scoot up and over in bed.  Pt. needing max A x 2 for scooting up in bed.  Pt. has purewick placed.  NSG present at end of session.  Call light in reach and bed alarm on.  Pt. interested in continued therapy and open to  TCU due to level of assist needed with transfers and pt. is at home alone at times during the day.   OT Discharge Planning   OT Plan toilet transfer, transfer up to chair for UE/LE dressing, attempt standing at sink for G/H   OT Discharge Recommendation (DC Rec) Transitional Care Facility   OT Rationale for DC Rec Pt. lives in home with dtr. and family who complete IADLS and can help with some ADLs.  Pt. does have DME for bathroom and ambulation but is home alone at times.  Pt. currently presents with generalized weakness and SOB impairing previus level of tolerance and independence with ADLs and needing A for transfers, dressing, toileting, and setup for G/H.  Recommend continued skilled IP OT and discharge to TCU to advance independence and tolerance for safe ADL participation.  If pt. able to have family home to assist 24/7, pt. could discharge home with home OT.   OT Brief overview of current status max A x 2 scooting up in bed, max A for transfers   OT Equipment Needed at Discharge gait belt;shower chair;walker, standard                                                                                   [facility]      OUTPATIENT OCCUPATIONAL THERAPY  EVALUATION  PLAN OF TREATMENT FOR OUTPATIENT REHABILITATION  (COMPLETE FOR INITIAL CLAIMS ONLY)  Patient's Last Name, First Name, M.I.  YOB: 1940  Keisha Kolb                          Provider's Name  [facility] Medical Record No.  [id number]                               Onset Date:  01/19/24   Start of Care Date:  (P) 01/20/24     Type:     ___PT   _X_OT   ___SLP Medical Diagnosis:  (P) CAP, generalized weakness, dyspnea, impaired independence and tolerance for ADLs                        OT Diagnosis:  (P) impaired independence and tolerance for ADLs   Visits from SOC:  1   _________________________________________________________________________________  Plan of  Treatment/Functional Goals    Planned Interventions: (P) ADL retraining, transfer training, progressive activity/exercise, risk factor education   Goals: See Occupational Therapy Goals on Care Plan in [facility] electronic health record.    Therapy Frequency: (P) Daily  Predicted Duration of Therapy Intervention: (P) 01/22/24  _________________________________________________________________________________    I CERTIFY THE NEED FOR THESE SERVICES FURNISHED UNDER        THIS PLAN OF TREATMENT AND WHILE UNDER MY CARE .             Physician Signature               Date    X_____________________________________________________                  Certification date from: (P) 01/20/24, Certification date to: (P) 01/22/24    Referring Physician: Reji Daigle            Initial Assessment        See Occupational Therapy evaluation dated (P) 01/20/24 in Epic electronic health record.

## 2024-01-20 NOTE — PLAN OF CARE
ROOM # 221    Living Situation (if not independent, order SW consult): Home with Son  Facility name:  : [facility] ( [facility])    Activity level at baseline:  Independent with rolling walker  Activity level on admit: Assist x 2    Who will be transporting you at discharge: Son    Patient registered to observation; given Patient Bill of Rights; given the opportunity to ask questions about observation status and their plan of care.  Patient has been oriented to the observation room, bathroom and call light is in place.    Discussed discharge goals and expectations with patient/family.         Goal Outcome Evaluation:

## 2024-01-20 NOTE — PROGRESS NOTES
PRIMARY DIAGNOSIS: Pneumonia   OUTPATIENT/OBSERVATION GOALS TO BE MET BEFORE DISCHARGE:  ADLs back to baseline: No    Activity and level of assistance: pt on bedrest     Pain status: Pain free.    Return to near baseline physical activity: No     Discharge Planner Nurse   Safe discharge environment identified: No  Barriers to discharge: Yes       Entered by: Annika Howard RN 01/20/2024 4:24 AM     Please review provider order for any additional goals.   Nurse to notify provider when observation goals have been met and patient is ready for discharge.

## 2024-01-20 NOTE — ED NOTES
PureWick placed as patient is unable to get out of bed. Area cleaned. No brief in place. Pt tolerated well.

## 2024-01-21 ENCOUNTER — APPOINTMENT (OUTPATIENT)
Dept: OCCUPATIONAL THERAPY | Facility: CLINIC | Age: 84
DRG: 193 | End: 2024-01-21
Payer: COMMERCIAL

## 2024-01-21 ENCOUNTER — APPOINTMENT (OUTPATIENT)
Dept: PHYSICAL THERAPY | Facility: CLINIC | Age: 84
DRG: 193 | End: 2024-01-21
Attending: STUDENT IN AN ORGANIZED HEALTH CARE EDUCATION/TRAINING PROGRAM
Payer: COMMERCIAL

## 2024-01-21 PROCEDURE — 97161 PT EVAL LOW COMPLEX 20 MIN: CPT | Mod: GP

## 2024-01-21 PROCEDURE — 97530 THERAPEUTIC ACTIVITIES: CPT | Mod: GP

## 2024-01-21 PROCEDURE — 250N000013 HC RX MED GY IP 250 OP 250 PS 637: Performed by: INTERNAL MEDICINE

## 2024-01-21 PROCEDURE — 250N000011 HC RX IP 250 OP 636: Performed by: STUDENT IN AN ORGANIZED HEALTH CARE EDUCATION/TRAINING PROGRAM

## 2024-01-21 PROCEDURE — 250N000011 HC RX IP 250 OP 636: Performed by: INTERNAL MEDICINE

## 2024-01-21 PROCEDURE — 120N000001 HC R&B MED SURG/OB

## 2024-01-21 PROCEDURE — 97535 SELF CARE MNGMENT TRAINING: CPT | Mod: GO

## 2024-01-21 PROCEDURE — 250N000013 HC RX MED GY IP 250 OP 250 PS 637: Performed by: STUDENT IN AN ORGANIZED HEALTH CARE EDUCATION/TRAINING PROGRAM

## 2024-01-21 PROCEDURE — 99232 SBSQ HOSP IP/OBS MODERATE 35: CPT | Performed by: INTERNAL MEDICINE

## 2024-01-21 PROCEDURE — 999N000156 HC STATISTIC RCP CONSULT EA 30 MIN

## 2024-01-21 RX ORDER — GUAIFENESIN/DEXTROMETHORPHAN 100-10MG/5
10 SYRUP ORAL EVERY 4 HOURS PRN
Status: DISCONTINUED | OUTPATIENT
Start: 2024-01-21 | End: 2024-01-22 | Stop reason: HOSPADM

## 2024-01-21 RX ORDER — IPRATROPIUM BROMIDE AND ALBUTEROL SULFATE 2.5; .5 MG/3ML; MG/3ML
3 SOLUTION RESPIRATORY (INHALATION) EVERY 6 HOURS PRN
Status: DISCONTINUED | OUTPATIENT
Start: 2024-01-21 | End: 2024-01-22 | Stop reason: HOSPADM

## 2024-01-21 RX ORDER — BENZONATATE 100 MG/1
100 CAPSULE ORAL 3 TIMES DAILY PRN
Status: DISCONTINUED | OUTPATIENT
Start: 2024-01-21 | End: 2024-01-22 | Stop reason: HOSPADM

## 2024-01-21 RX ADMIN — ENOXAPARIN SODIUM 40 MG: 40 INJECTION SUBCUTANEOUS at 10:29

## 2024-01-21 RX ADMIN — BENZONATATE 100 MG: 100 CAPSULE ORAL at 17:06

## 2024-01-21 RX ADMIN — CEFTRIAXONE 2 G: 2 INJECTION, POWDER, FOR SOLUTION INTRAMUSCULAR; INTRAVENOUS at 14:18

## 2024-01-21 RX ADMIN — DOXYCYCLINE HYCLATE 100 MG: 100 CAPSULE ORAL at 20:30

## 2024-01-21 RX ADMIN — GUAIFENESIN 600 MG: 600 TABLET, EXTENDED RELEASE ORAL at 20:30

## 2024-01-21 RX ADMIN — GUAIFENESIN 600 MG: 600 TABLET, EXTENDED RELEASE ORAL at 08:51

## 2024-01-21 RX ADMIN — DOXYCYCLINE HYCLATE 100 MG: 100 CAPSULE ORAL at 08:51

## 2024-01-21 RX ADMIN — CITALOPRAM HYDROBROMIDE 40 MG: 20 TABLET ORAL at 21:30

## 2024-01-21 ASSESSMENT — ACTIVITIES OF DAILY LIVING (ADL)
DEPENDENT_IADLS:: LAUNDRY;TRANSPORTATION
ADLS_ACUITY_SCORE: 33
ADLS_ACUITY_SCORE: 31
ADLS_ACUITY_SCORE: 33
ADLS_ACUITY_SCORE: 31
ADLS_ACUITY_SCORE: 33
ADLS_ACUITY_SCORE: 29
ADLS_ACUITY_SCORE: 33
ADLS_ACUITY_SCORE: 31
ADLS_ACUITY_SCORE: 33
ADLS_ACUITY_SCORE: 31
ADLS_ACUITY_SCORE: 31
ADLS_ACUITY_SCORE: 33

## 2024-01-21 NOTE — PROGRESS NOTES
01/21/24 0902   Appointment Info   Signing Clinician's Name / Credentials (PT) Quynh Reynoso DPT   Living Environment   People in Home child(may), adult;grandchild(may)   Current Living Arrangements house   Home Accessibility stairs to enter home;stairs within home   Number of Stairs, Main Entrance 1   Stair Railings, Main Entrance railings safe and in good condition   Number of Stairs, Within Home, Primary six   Stair Railings, Within Home, Primary railings safe and in good condition   Transportation Anticipated family or friend will provide   Self-Care   Usual Activity Tolerance good   Current Activity Tolerance poor   Equipment Currently Used at Home walker, rolling;grab bar, toilet;grab bar, tub/shower;tub bench   Fall history within last six months yes   Number of times patient has fallen within last six months 1   General Information   Onset of Illness/Injury or Date of Surgery 01/19/24   Referring Physician Reji Daigle MD   Patient/Family Therapy Goals Statement (PT) Return home   Pertinent History of Current Problem (include personal factors and/or comorbidities that impact the POC) Keisha Kolb is a 83 year old female with past medical history significant for squamous cell carcinoma of the skin, breast cancer, reactive airway disease since having covid (not on oxygen at home), recent acute hypoxic respiratory failure due to RSV infection (11/2023), hyperlipidemia, chronic thrombocytopenia, and anxiety who presented to [facility] on 1/19/2024 with 1 week history of shortness of breath and cough and was found to have community acquired pneumonia. Patient registered to observation for generalized weakness due ot her current illness.   Existing Precautions/Restrictions fall;oxygen therapy device and L/min   Cognition   Affect/Mental Status (Cognition) WFL   Orientation Status (Cognition) oriented x 4   Follows Commands (Cognition) WFL   Pain Assessment   Patient Currently in Pain No    Integumentary/Edema   Integumentary/Edema no deficits were identifed   Posture    Posture Forward head position;Protracted shoulders   Range of Motion (ROM)   Range of Motion ROM is WFL   Strength (Manual Muscle Testing)   Strength (Manual Muscle Testing) Deficits observed during functional mobility   Bed Mobility   Comment, (Bed Mobility) Supine to sit CGA   Transfers   Comment, (Transfers) Not tested d/t dizziness and weakness   Gait/Stairs (Locomotion)   Comment, (Gait/Stairs) Not tested d/t dizziness and weakness   Balance   Balance Comments Fair seated   Sensory Examination   Sensory Perception patient reports no sensory changes   Clinical Impression   Criteria for Skilled Therapeutic Intervention Yes, treatment indicated   PT Diagnosis (PT) Impaired functional mobility and gait   Influenced by the following impairments Pain, weakness, decreased activity tolerance, impaired balance, decreased activity tolerance   Functional limitations due to impairments Limited functional mobility requiring AD and assist   Clinical Presentation (PT Evaluation Complexity) stable   Clinical Presentation Rationale Based on PMH, current status, and social support   Clinical Decision Making (Complexity) low complexity   Planned Therapy Interventions (PT) balance training;bed mobility training;gait training;home exercise program;neuromuscular re-education;patient/family education;strengthening;stair training;transfer training;home program guidelines;risk factor education;progressive activity/exercise   Risk & Benefits of therapy have been explained evaluation/treatment results reviewed;care plan/treatment goals reviewed;risks/benefits reviewed;current/potential barriers reviewed;participants voiced agreement with care plan;participants included;patient   PT Total Evaluation Time   PT Eval, Low Complexity Minutes (76489) 10   Physical Therapy Goals   PT Frequency 5x/week   PT Predicted Duration/Target Date for Goal Attainment  01/28/24   PT Goals Bed Mobility;Transfers;Gait;Stairs   PT: Bed Mobility Independent;Supine to/from sit   PT: Transfers Modified independent;Sit to/from stand;Assistive device   PT: Gait Modified independent;Assistive device;50 feet   PT: Stairs Supervision/stand-by assist;6 stairs   Interventions   Interventions Quick Adds Therapeutic Activity   Therapeutic Activity   Therapeutic Activities: dynamic activities to improve functional performance Minutes (31506) 25   Symptoms Noted During/After Treatment Fatigue;Dizziness   Treatment Detail/Skilled Intervention Pt supine in bed upon therapist arrival, agreeable to PT. Pt on 1L O2. Pt significantly slid down in bed. Pt able to boost up in bed w/ use of bed rails and Min A. Cueing for bracing feet on bed. Once sitting upright in bed, pt reports breathing better, SpO2 96%. Pt sitting EOB, reported mild dizziness, pt demos fair sitting balance. Pt sat EOB for >5 minutes w/ SBA. Pt reports dizziness continuing, returned to supine w/ SBA. Pt able to boost self in bed w/ SBA and use of bed rails. Pt in bed at end of session, all needs w/in reach, bed alarm on, HOB elevated, RN updated.   PT Discharge Planning   PT Plan Progress bed mob, seated balance, assess transfers   PT Discharge Recommendation (DC Rec) Transitional Care Facility   PT Rationale for DC Rec Pt below baseline, currently Ax1 for bed mob. Pt limited by weakness and dizziness. Recommend TCU to increase strength and functional mobility.   PT Brief overview of current status Lift   Total Session Time   Timed Code Treatment Minutes 25   Total Session Time (sum of timed and untimed services) 35

## 2024-01-21 NOTE — PROGRESS NOTES
[facility]    Medicine Progress Note - Hospitalist Service    Date of Admission:  1/19/2024    Primary Care Physician   MALAIKA BOLDEN  CONSULTANTS: none     Assessment & Plan   Keisha Kolb is a 83 year old female with past medical history significant for squamous cell carcinoma of the skin, breast cancer, reactive airway disease since having covid (not on oxygen at home), recent acute hypoxic respiratory failure due to RSV infection (11/2023), hyperlipidemia, chronic thrombocytopenia, and anxiety who presented to [facility] on 1/19/2024 with 1 week history of shortness of breath and cough and was found to have community acquired pneumonia. Patient registered to observation for generalized weakness due ot her current illness.     Generalized Weakness, failure to thrive  Patient unfortunately very weak and unable to demonstrate home safety during evaluation in emergency department. Suspect due to acute illness with her pneumonia. Registering patient to observation and is being followed by physical therapy/occupational therapy.   Patient resides in a home with her granddaughter, her granddaughter's  and family.  Does have six stairs to contend with to get to her living quarters.  Aat this point the patient does not feel safe to go home and is open to a Tcu at discharge.         Community-acquired vs Atypical Pneumonia, acute hypoxic respiratory failure,  Reactive Airway Disease, Pulmonary Nodule  Presented with cough, shortness of breath, and oxygen saturations in low-90s per home nursing. Left upper and midlung opacities noted on CXR. CTA negative for a pulmonary embolus with BA nodule vs nodular infiltrate. Incidentally discovered 8mm nodule of left upper lobe. Described in CT chest as nodule vs nodular infiltrate. Given presentation for pneumonia, could consider atypical infection cause if symptoms do not improve with antibiotics. Also with bronchial wall  thickening noted on CT, as well as history of reactive airway disease.  Patient has a history of reactive airway disease following her COVID infection and recently had RSV.  She is on inhalers at home.  Continue on Flovent and will have her on scheduled nebs.  Patient is on IV Rocephin which I will continue for another day as well as her doxycycline.  She does not carry diagnosis of asthma, COPD, and is not on chronic oxygen at home this was confirmed with the patient's granddaughter whom she lives with.  Will add Mucinex to the patient's regimen to help with her secretions.  She currently is on oxygen we will try and wean this down.  Wean oxygen to keep >92%.  With regard to the patient's lung nodule, had a discussion with the patient's granddaughter to make sure she was aware of the findings.  At this point the goal would not to be to work this up as they would not want biopsies or chemotherapy going forward.  They will make sure the patient continues to be comfortable and not worry.        Leukocytosis: WBC 13.3; likely due to her pneumonia vs stress demargination. Wbc on repeat was down to 11.1    Hyperlipidemia: patient is on a statin at home.  At this point the risk far outweighs the benefit so we will be stopping this medication..     Chronic Thrombocytopenia: Hx of prior thrombocytopenia. Now within normal limits, last being 150    Anxiety: Continue PTA citalopram.  Patient does take Ativan at home as needed which she has been on for decades.  Given the patient's age and confusion at home I do not think this is an appropriate medication for so we should be stopping it.  This was discussed with the family as well.  She has not been requesting this while hospitalized.     Vascular dementia:  Per the patient's family the patient has had multiple mini strokes and has had confusion related to this.  She is unable to do her own ADLs and lives with her granddaughter who does most of her ADLs for her.  Goal is for  her to be in a safe environment and hopefully not have to go to a nursing home.  Want to try and limit the amount of intrusions we have within her day.  Need to keep her window shades up during the day and allow her to sleep at night.  Try and limit the number of vital signs we do and pokes.  Is at risk for sundowning.  Also need to because intent of tethers which put her at a higher risk of falling these would include oxygen tubing as well as pneumoboots.  Her confusion seems better 1/21/24    Advance care planning: Had a short discussion with the patient's family who states the patient has a healthcare directive and states that she is DNR/I.  This is different than what is been ordered within [facility] but the family is clear and their wishes, this was discussed with Annika the patient's granddaughter.  Family needs to bring in the HCD to have scanned into epic.     Remote left Breast Cancer, hs of SCC of Skin          4Ms:  Polypharmacy: Medications reviewed.  Given the patient's memory issues, confusion, and frailty at this point I think stopping her Ativan and her statin are appropriate as the risk outweighs the benefit  Mentation:  Capacity questionable, patient relies on her family.  She has had sundowning at the hospital.  She resides with her granddaughter in a house and has her ADLs done by her  Social support: Patient resides with her granddaughter Annika and her .  Annika does her ADLs for her except for dressing which the patient can do when she is healthy but her medications are set up by family.  Mobility: Uses a 4 wheeled walker  What is importmant: To try stay as independent as possible at home with the goal of trying not to go to a nursing home if possible but the family understands as her dementia gets worse this may be required in the future      Discussed plan of care with bedside nursing, social work, therapy on team rounds, updated granddaughter Annika by phone with message left      Diet:  "Regular Diet Adult    DVT Prophylaxis: Enoxaparin (Lovenox) SQ  Izaguirre Catheter: Not present  Lines: None     Cardiac Monitoring: None    RESTRAINTS: Not indicated  Code Status: DNR/I,         Follow up plan: Will follow-up with her primary care provider after discharge    This document was created using voice recognition technology.  Please excuse any typographical errors that may have occurred.  Please call with any questions.       Clinically Significant Risk Factors Present on Admission                       # Overweight: Estimated body mass index is 29.02 kg/m  as calculated from the following:    Height as of this encounter: 1.626 m (5' 4\").    Weight as of this encounter: 76.7 kg (169 lb 1.5 oz).              Disposition Plan      Expected Discharge Date: 01/22/2024      Destination: home with family        at this point, patient is fearful to go home and thinks a Tcu makes sense at discharge.   Could leave to a Tcu on oxygen and wean there.        PCP: [facility] in [place]  Barrier to discharge: Weakness, hypoxia, or pneumonia, bed placement at a TCU    Isiah Ray MD  Hospitalist Service  [facility]  Securely message with [employer] (more info)  Text page via [facility] Paging/Directory   ______________________________________________________________________    Interval History   Patient overall is feeling a little bit better but is still requiring oxygen.  Still having a cough.  Is less confused.  Is fearful to return home she think she is too weak and would benefit from rehab.      ROS: A comprehensive review of systems was negative except for items noted in the HPI.  Patient currently denies any fever, chills, sweats, nausea, vomiting, diarrhea, or chest pain.       Physical Exam   Vital Signs: Temp: 98  F (36.7  C) Temp src: Oral BP: (!) 140/67 Pulse: 73   Resp: 16 SpO2: 94 % O2 Device: Nasal cannula Oxygen Delivery: 1 LPM  Weight: 169 lbs 1.49 oz    Exam does seem low but better " than yesterday  General appearance: Patient is alert and oriented x2, somewhat confused but better today, sitting up in bed, appears ill, pleasant and conversing normally, speaking in full sentences, appears stated age and is frail  HEENT:    Mucous membranes are moist  RESPIRATORY: Patient with some rhonchi with an improved cough from yesterday, no significant wheezing currently, moderate air movement  CARDIOVASCULAR: Regular rate and rhythm, normal S1/S2, no murmurs   GASTROINTESTINAL: Non-distended, non-tender, soft, bowel sounds present throughout  NEUROLOGIC:  Cranial nerves II-XII intact, without any focal deficits, strength 5/5 throughout, globally weak  EXTREMITIES:  Moves all extremities, no clubbing, cyanosis, nor edema  :  Izaguirre not present         Data     I have personally reviewed the following data over the past 24 hrs:    N/A  \   N/A   / N/A     N/A N/A N/A /  N/A   N/A N/A 0.59 \       Imaging:   Results for orders placed or performed during the hospital encounter of 01/19/24   XR Chest 2 Views    Narrative    CHEST TWO VIEWS  1/19/2024 1:51 PM     HISTORY:  Cough, shortness of breath.    COMPARISON: Chest radiograph 11/24/2023.      Impression    IMPRESSION: Similar mild elevation of the left hemidiaphragm and  adjacent atelectasis.  New faint left upper and midlung opacities  could be infectious/inflammatory. No pleural effusion or pneumothorax.  Similar enlarged cardiomediastinal silhouette.    HI JACOBS MD         SYSTEM ID:  [id number]   CT Chest Pulmonary Embolism w Contrast    Narrative    CT CHEST PULMONARY EMBOLISM WITH CONTRAST 1/19/2024 2:40 PM    CLINICAL HISTORY: Chest pain. Elevated D-dimer, dyspnea.    TECHNIQUE: CT angiogram chest during arterial phase injection IV  contrast. 2D and 3D MIP reconstructions were performed by the CT  technologist. Dose reduction techniques were used.   CONTRAST: 61 mL Isovue-370    COMPARISON: October 24, 2022    FINDINGS:  ANGIOGRAM  CHEST: Pulmonary arteries are normal caliber and negative  for pulmonary emboli. Thoracic aorta is negative for dissection. No CT  evidence of right heart strain.    LUNGS AND PLEURA: New 8 mm nodule vs. nodular infiltrate in the  central left upper lobe image 117 series 7. Minimal dependent  atelectasis vs. less likely infiltrate. Mild to moderate bronchial  wall thickening. No effusions.    MEDIASTINUM/AXILLAE: Mildly prominent subcarinal node similar to  previous. No aneurysm.    CORONARY ARTERY CALCIFICATIONS: None.    UPPER ABDOMEN: No acute findings.    MUSCULOSKELETAL: No frankly destructive bony lesions.      Impression    IMPRESSION:  1.  No pulmonary embolism demonstrated.  2.  New 8 mm nodule vs. nodular infiltrate.    Recommendations for an incidental lung nodule average diameter > or =  8mm:    Low risk patients: Consider follow-up CT in 3 months, PET/CT, and/or  tissue sampling. Of those are recommended 3 month follow-up.    High risk patients: Same as for low risk patients.    *Low Risk: Minimal or absent history of smoking or other known risk  factors.  *Nonsolid (groundglass) or partly solid nodules may require longer  follow-up to exclude indolent adenocarcinoma.  *Recommendations based on Guidelines for the Management of Incidental  Pulmonary Nodules Detected at CT: From the Fleischner Society 2017,  Radiology 2017.       MARA MEDRANO MD         SYSTEM ID:  [id number]     Procedures: None  I have personally have reviewed the patient's most up to date  labs, orders, and medications myself

## 2024-01-21 NOTE — PLAN OF CARE
"Goal Outcome Evaluation:    Care from [id number]    Inpatient Progress Note:  For complete assessment see flow sheet documentation.    /60 (BP Location: Right arm)   Pulse 88   Temp 98.4  F (36.9  C) (Oral)   Resp 22   Ht 1.626 m (5' 4\")   Wt 76.7 kg (169 lb 1.5 oz)   SpO2 92%   BMI 29.02 kg/m     Pt. Alert & oriented with some forgetfulness. VSS, Denies pain. Assist x 1 with GB/walker to BSC. BM x 1. Voiding spontaneously. Continues on 1 L 02 NC, unable to tolerate weaning off  oxygen. On Rocephin q 24 hrs. Will continue to monitor and provide cares.     Shira Weathers RN           "

## 2024-01-21 NOTE — CONSULTS
Care Management Initial Consult    General Information  Assessment completed with: Patient,    Type of CM/SW Visit: Initial Assessment    Primary Care Provider verified and updated as needed:     Readmission within the last 30 days: no previous admission in last 30 days      Reason for Consult: discharge planning  Advance Care Planning:            Communication Assessment  Patient's communication style: spoken language (English or Bilingual)    Hearing Difficulty or Deaf: no   Wear Glasses or Blind: yes    Cognitive  Cognitive/Neuro/Behavioral: .WDL except, orientation  Level of Consciousness: alert  Arousal Level: opens eyes spontaneously  Orientation: disoriented to, time  Mood/Behavior: calm, cooperative  Best Language: 0 - No aphasia  Speech: clear, spontaneous, logical    Living Environment:   People in home: grandchild(may)     Current living Arrangements: house      Able to return to prior arrangements:         Family/Social Support:  Care provided by: self, other (see comments) (granddtr)  Provides care for: no one  Marital Status:   Children (grandchildren)          Description of Support System: Supportive, Involved         Current Resources:   Patient receiving home care services: Yes  Skilled Home Care Services: Physical Therapy  Community Resources: None  Equipment currently used at home: walker, rolling, grab bar, toilet, grab bar, tub/shower, tub bench  Supplies currently used at home: None    Employment/Financial:  Employment Status:          Financial Concerns:             Does the patient's insurance plan have a 3 day qualifying hospital stay waiver?  No    Lifestyle & Psychosocial Needs:  Social Determinants of Health     Food Insecurity: Not on file   Depression: Not on file   Housing Stability: Not on file   Tobacco Use: Medium Risk (10/12/2023)    Patient History     Smoking Tobacco Use: Former     Smokeless Tobacco Use: Never     Passive Exposure: Not on file   Financial Resource  Strain: Not on file   Alcohol Use: Not on file   Transportation Needs: Not on file   Physical Activity: Not on file   Interpersonal Safety: Not on file   Stress: Not on file   Social Connections: Not on file       Functional Status:  Prior to admission patient needed assistance:   Dependent ADLs:: Independent  Dependent IADLs:: Laundry, Transportation  Assesssment of Functional Status: Not at baseline with mobility    Mental Health Status:  Mental Health Status: No Current Concerns       Chemical Dependency Status:  Chemical Dependency Status: No Current Concerns             Values/Beliefs:  Spiritual, Cultural Beliefs, Buddhist Practices, Values that affect care:                 Additional Information:  Spoke with patient, discussed recommendation for TCU and patient is agreeable.  Patient prefers [facility] as second choice, will send TCU referrals and patient states family can transport at discharge.    Patrick Sherwood, ANTHONY  [phone]

## 2024-01-21 NOTE — PROVIDER NOTIFICATION
RCAT Treatment Plan    Patient Score: 10  Patient Acuity: 4    Clinical Indication for Therapy: CAP, history or Asthma    Therapy Ordered: Duoneb Q6prn    Education: pt sleeping, no education done at this time    Assessment Summary:      01/21/24 0552   RCAT Assessment   Reason for Assessment Other (see comments)  (CAP)   Pulmonary Status 3  (history of asthma)   Surgical Status 0   Chest X-ray 2   Respiratory Pattern 1   Mental Status 0   Breath Sounds 2   Cough Effectiveness 0   Level of Activity 1   O2 Required for SpO2>=92% 1   Acuity Level (points) 10   Acuity Level  4   Re-eval Interval Guideline Every 3 days   Re-evaluation Date 01/24/24   Clinical Indications/Symptoms   Aerosol Therapy RCAT protocol   Volume Expansion Decreased breath sounds   Aerosol Therapy Plan   RT Treatment Nebulizer   Anticholinergic/Beta-Andrenergic Agonist Duoneb soln (0.5mg/3mg per 3mL) neb Max 6 doses/24h   Aerosol Treatment Frequency Acuity Level 1 or 2 : Q4h ATC and PRN-Severe wheezing, unable to sleep   Volume Expansion Plan   Volume Expansion Treatment Incentive Spirometer   Volume Expansion Frequency Acuity Level 4: BID-Prevention of atelectasis         Rosemarie Norton, RT  1/21/2024

## 2024-01-22 ENCOUNTER — LAB REQUISITION (OUTPATIENT)
Dept: LAB | Facility: CLINIC | Age: 84
End: 2024-01-22
Payer: COMMERCIAL

## 2024-01-22 VITALS
RESPIRATION RATE: 20 BRPM | DIASTOLIC BLOOD PRESSURE: 74 MMHG | HEART RATE: 71 BPM | SYSTOLIC BLOOD PRESSURE: 147 MMHG | OXYGEN SATURATION: 92 % | HEIGHT: 64 IN | WEIGHT: 169.09 LBS | BODY MASS INDEX: 28.87 KG/M2 | TEMPERATURE: 98.8 F

## 2024-01-22 DIAGNOSIS — Z11.1 ENCOUNTER FOR SCREENING FOR RESPIRATORY TUBERCULOSIS: ICD-10-CM

## 2024-01-22 PROCEDURE — 99238 HOSP IP/OBS DSCHRG MGMT 30/<: CPT | Performed by: INTERNAL MEDICINE

## 2024-01-22 PROCEDURE — 250N000013 HC RX MED GY IP 250 OP 250 PS 637: Performed by: STUDENT IN AN ORGANIZED HEALTH CARE EDUCATION/TRAINING PROGRAM

## 2024-01-22 PROCEDURE — 250N000013 HC RX MED GY IP 250 OP 250 PS 637: Performed by: INTERNAL MEDICINE

## 2024-01-22 PROCEDURE — 250N000011 HC RX IP 250 OP 636: Performed by: INTERNAL MEDICINE

## 2024-01-22 RX ORDER — ALBUTEROL SULFATE 90 UG/1
2 AEROSOL, METERED RESPIRATORY (INHALATION) EVERY 4 HOURS PRN
Qty: 18 G | Refills: 0 | Status: SHIPPED | OUTPATIENT
Start: 2024-01-22

## 2024-01-22 RX ORDER — CEFDINIR 300 MG/1
300 CAPSULE ORAL 2 TIMES DAILY
Start: 2024-01-22 | End: 2024-01-25

## 2024-01-22 RX ORDER — DOXYCYCLINE 100 MG/1
100 CAPSULE ORAL EVERY 12 HOURS
Start: 2024-01-22 | End: 2024-01-25

## 2024-01-22 RX ORDER — GUAIFENESIN 600 MG/1
600 TABLET, EXTENDED RELEASE ORAL 2 TIMES DAILY
Start: 2024-01-22 | End: 2024-01-27

## 2024-01-22 RX ORDER — GUAIFENESIN/DEXTROMETHORPHAN 100-10MG/5
10 SYRUP ORAL EVERY 4 HOURS PRN
Start: 2024-01-22

## 2024-01-22 RX ADMIN — ENOXAPARIN SODIUM 40 MG: 40 INJECTION SUBCUTANEOUS at 10:34

## 2024-01-22 RX ADMIN — GUAIFENESIN 600 MG: 600 TABLET, EXTENDED RELEASE ORAL at 08:54

## 2024-01-22 RX ADMIN — DOXYCYCLINE HYCLATE 100 MG: 100 CAPSULE ORAL at 08:54

## 2024-01-22 ASSESSMENT — ACTIVITIES OF DAILY LIVING (ADL)
ADLS_ACUITY_SCORE: 29

## 2024-01-22 NOTE — PLAN OF CARE
Still requiring 1L of oxygen as she was unable to wean off. Pt in the low 90s on 1L, incentive spirometer provided although declining frequent use as she was coughing. Room humidifier in place, new order for PRN cough suppressants obtained/administered. Able to ambulate in the room and to the bathroom. Will keep monitoring.

## 2024-01-22 NOTE — PROGRESS NOTES
"Care from 0700 am to 13:15 pm  BP (!) 147/74 (BP Location: Right arm)   Pulse 71   Temp 98.8  F (37.1  C) (Oral)   Resp 20   Ht 1.626 m (5' 4\")   Wt 76.7 kg (169 lb 1.5 oz)   SpO2 92%   BMI 29.02 kg/m       Pt alert and oriented x 4, cleared for discharge to TCU. Grand-daughter Annika will pick pt up and transport her to [facility] TCU. Pt is tolerating oral diet and medications, denies malaise, nausea or vomiting. Infrequent coughing, using incentive spirometer, voiding adequately, AX1 with gait belt and walker to bathroom. Up to chair for most of morning. IV discontinued, Pt dressed and waiting for . Vitals stable, continue plan of care.  "

## 2024-01-22 NOTE — PROGRESS NOTES
Care Management Discharge Note    Discharge Date: 01/22/2024    [facility]    Discharge Services:      Discharge DME:      Discharge Transportation: granddaughter - Annika    Private pay costs discussed: Not applicable    PAS Confirmation Code:  [id number]    Education Provided on the Discharge Plan:  yes  Persons Notified of Discharge Plans: pt, pt's granddaughter Annika, nursing, MD, TCU admissions Latrece/Beth  Patient/Family in Agreement with the Plan:  yes    Handoff Referral Completed: No    Additional Information:  Pt discharging to TCU today. Accepted at [facility].     Insurance auth obtained:  Auth #: [id number]  Dates: 1/22-1/29/24    PAS Created On: 1/22/2024 11:22 AM  Your confirmation number is: [id number]    Spoke with pt and pt's granddaughter AnnikaTaniya Roblero plans to transport today at 1300.     Orders faxed.     MARCELO Hunter, LSW  Care Coordination  [phone]    MARCELO Mosquera

## 2024-01-22 NOTE — PROGRESS NOTES
Patient's After Visit Summary was reviewed with patient and/or family.   Patient verbalized understanding of After Visit Summary, recommended follow up and was given an opportunity to ask questions.   Discharge medications sent home with patient/family: YES, No, Not applicable   Discharged with other:grand daughter Annika

## 2024-01-22 NOTE — PLAN OF CARE
Physical Therapy Discharge Summary    Reason for therapy discharge:    Discharged to transitional care facility.    Progress towards therapy goal(s). See goals on Care Plan in [facility] electronic health record for goal details.  Goals not met.  Barriers to achieving goals:   discharge from facility.    Therapy recommendation(s):    Continued therapy is recommended.  Rationale/Recommendations:  Recommend continued therapy at TCU to progress independence with mobility and strength.

## 2024-01-22 NOTE — PLAN OF CARE
"Goal Outcome Evaluation:    Care from [id number]    Inpatient Progress Note:  For complete assessment see flow sheet documentation.    BP (!) 145/74 (BP Location: Right arm)   Pulse 77   Temp 98.8  F (37.1  C) (Oral)   Resp 16   Ht 1.626 m (5' 4\")   Wt 76.7 kg (169 lb 1.5 oz)   SpO2 92%   BMI 29.02 kg/m     Alert & oriented with some forgetfulness. VSS, Denies pain.Up with assist x 1 with GB/Walker.Still remains on 1 L 02 NC, unable to wean off. Room humidifier on. IS encouraged and education reinforced. SW following for disposition.TCU referrals sent.Will continue to monitor and provide cares.     Shira Weathers RN           "

## 2024-01-22 NOTE — DISCHARGE SUMMARY
[facility]  Hospitalist Discharge Summary      Date of Admission:  1/19/2024  Date of Discharge:  1/22/2024  Discharging Provider: Isiah Ray MD  Discharge Service: Hospitalist Service  Primary Care Physician   MALAIKA BOLDEN    Discharge Diagnoses   Generalized weakness  Failure to thrive  Community-acquired pneumonia  Hypoxic respiratory failure  Reactive airway disease pulmonary nodule  Hyperlipidemia  Chronic thrombocytopenia  Anxiety  Vascular dementia  Advanced care planning  Remote left breast cancer  History of skin cancer      Hospital Course   Keisha Kolb is a 83 year old female with past medical history significant for squamous cell carcinoma of the skin, breast cancer, reactive airway disease since having covid (not on oxygen at home), recent acute hypoxic respiratory failure due to RSV infection (11/2023), hyperlipidemia, chronic thrombocytopenia, and anxiety who presented to [facility] on 1/19/2024 with 1 week history of shortness of breath and cough and was found to have community acquired pneumonia. Patient registered to observation for generalized weakness due ot her current illness.     Generalized Weakness, failure to thrive  Patient unfortunately very weak and unable to demonstrate home safety during evaluation in emergency department. Suspect due to acute illness with her pneumonia. Registering patient to observation and switch to inpatient due to her ongoing hypoxia and is being followed by physical therapy/occupational therapy.   Patient resides in a home with her granddaughter, her granddaughter's  and family.  Does have six stairs to contend with to get to her living quarters.  At this point the patient does not feel safe to go home and is open to a Tcu at discharge.  Social work will place patient on TCU at discharge.        Community-acquired vs Atypical Pneumonia, acute hypoxic respiratory failure,  Reactive Airway Disease,  Pulmonary Nodule  Presented with cough, shortness of breath, and oxygen saturations in low-90s per home nursing. Left upper and midlung opacities noted on CXR. CTA negative for a pulmonary embolus with BA nodule vs nodular infiltrate. Incidentally discovered 8mm nodule of left upper lobe. Described in CT chest as nodule vs nodular infiltrate. Given presentation for pneumonia, could consider atypical infection cause if symptoms do not improve with antibiotics. Also with bronchial wall thickening noted on CT, as well as history of reactive airway disease.  Patient has a history of reactive airway disease following her COVID infection and recently had RSV.  She is on inhalers at home.  Continue on Flovent and will have her on scheduled nebs.  Patient is on IV Rocephin which I will continue for another day as well as her doxycycline.  She does not carry diagnosis of asthma, COPD, and is not on chronic oxygen at home this was confirmed with the patient's granddaughter whom she lives with.  Will add Mucinex to the patient's regimen to help with her secretions.  She currently is on oxygen we will try and wean this down.  Wean oxygen to keep >92%.  With regard to the patient's lung nodule, had a discussion with the patient's granddaughter to make sure she was aware of the findings.  At this point the goal would not to be to work this up as they would not want biopsies or chemotherapy going forward.  They will make sure the patient continues to be comfortable and not worry about the potential of having cancer        Leukocytosis: WBC 13.3; likely due to her pneumonia vs stress demargination. Wbc on repeat was down to 11.1 on 1/20/2024     Hyperlipidemia: patient is on a statin at home.  At this point the risk far outweighs the benefit so we will be stopping this medication..      Chronic Thrombocytopenia: Hx of prior thrombocytopenia. Now within normal limits, last being 150     Anxiety: Continue PTA citalopram.  Patient  "does take Ativan at home as needed which she has been on for decades.  Given the patient's age and confusion at home I do not think this is an appropriate medication for so we should be stopping it.  This was discussed with the family as well.  She has not been requesting Ativan while hospitalized.      Vascular dementia:  Per the patient's family the patient has had multiple \"mini strokes\" and has had confusion related to this.  She is unable to do her own ADLs and lives with her granddaughter who does most of her ADLs for her.  Goal is for her to be in a safe environment and hopefully not have to go to a nursing home.  Want to try and limit the amount of intrusions we have within her day.  Need to keep her window shades up during the day and allow her to sleep at night.  Try and limit the number of vital signs we do and pokes.  Is at risk for sundowning.  Also, need to get rid of tethers which put her at a higher risk of falling these would include oxygen tubing as well as pneumoboots.  Her confusion seems better 1/21/24 if she has improved clinically.     Advance care planning: Had a short discussion with the patient's family who states the patient has a healthcare directive and states that she is DNR/I.  This is different than what is been ordered within [facility] but the family is clear and their wishes, this was discussed with Annika the patient's granddaughter.  Family needs to bring in the HCD to have scanned into epic.      Remote left Breast Cancer, hs of SCC of Skin              4Ms:  Polypharmacy: Medications reviewed.  Given the patient's memory issues, confusion, and frailty at this point I think stopping her Ativan and her statin are appropriate as the risk outweighs the benefit  Mentation:  Capacity questionable, patient relies on her family.  She has had sundowning at the hospital.  She resides with her granddaughter in a house and has her ADLs done by her  Social support: Patient resides with her " "granddaughter Annika and her .  Annika does her ADLs for her except for dressing which the patient can do when she is healthy but her medications are set up by family.  Mobility: Uses a 4 wheeled walker  What is importmant: To try stay as independent as possible at home with the goal of trying not to go to a nursing home if possible but the family understands as her dementia gets worse this may be required in the future    Clinically Significant Risk Factors     # Overweight: Estimated body mass index is 29.02 kg/m  as calculated from the following:    Height as of this encounter: 1.626 m (5' 4\").    Weight as of this encounter: 76.7 kg (169 lb 1.5 oz).       Significant Results and Procedures   Most Recent 3 CBC's:  Recent Labs   Lab Test 01/20/24  0655 01/19/24  1152 11/27/23  0603   WBC 11.1* 13.3* 14.3*   HGB 13.4 13.5 14.1   MCV 91 91 91    185 218     Most Recent 3 BMP's:  Recent Labs   Lab Test 01/20/24  1104 01/20/24  0655 01/19/24  1152 11/27/23  0603   NA  --  135 134* 136   POTASSIUM  --  3.7 3.7 4.3   CHLORIDE  --  98 98 99   CO2  --  25 26 28   BUN  --  8.7 11.1 21.9   CR 0.59 0.65 0.77 0.79   ANIONGAP  --  12 10 9   KRISTEL  --  8.7* 8.7* 8.7*   GLC  --  105* 113* 138*   ,   Results for orders placed or performed during the hospital encounter of 01/19/24   XR Chest 2 Views    Narrative    CHEST TWO VIEWS  1/19/2024 1:51 PM     HISTORY:  Cough, shortness of breath.    COMPARISON: Chest radiograph 11/24/2023.      Impression    IMPRESSION: Similar mild elevation of the left hemidiaphragm and  adjacent atelectasis.  New faint left upper and midlung opacities  could be infectious/inflammatory. No pleural effusion or pneumothorax.  Similar enlarged cardiomediastinal silhouette.    HI JACOBS MD         SYSTEM ID:  [id number]   CT Chest Pulmonary Embolism w Contrast    Narrative    CT CHEST PULMONARY EMBOLISM WITH CONTRAST 1/19/2024 2:40 PM    CLINICAL HISTORY: Chest pain. Elevated D-dimer, " dyspnea.    TECHNIQUE: CT angiogram chest during arterial phase injection IV  contrast. 2D and 3D MIP reconstructions were performed by the CT  technologist. Dose reduction techniques were used.   CONTRAST: 61 mL Isovue-370    COMPARISON: October 24, 2022    FINDINGS:  ANGIOGRAM CHEST: Pulmonary arteries are normal caliber and negative  for pulmonary emboli. Thoracic aorta is negative for dissection. No CT  evidence of right heart strain.    LUNGS AND PLEURA: New 8 mm nodule vs. nodular infiltrate in the  central left upper lobe image 117 series 7. Minimal dependent  atelectasis vs. less likely infiltrate. Mild to moderate bronchial  wall thickening. No effusions.    MEDIASTINUM/AXILLAE: Mildly prominent subcarinal node similar to  previous. No aneurysm.    CORONARY ARTERY CALCIFICATIONS: None.    UPPER ABDOMEN: No acute findings.    MUSCULOSKELETAL: No frankly destructive bony lesions.      Impression    IMPRESSION:  1.  No pulmonary embolism demonstrated.  2.  New 8 mm nodule vs. nodular infiltrate.    Recommendations for an incidental lung nodule average diameter > or =  8mm:    Low risk patients: Consider follow-up CT in 3 months, PET/CT, and/or  tissue sampling. Of those are recommended 3 month follow-up.    High risk patients: Same as for low risk patients.    *Low Risk: Minimal or absent history of smoking or other known risk  factors.  *Nonsolid (groundglass) or partly solid nodules may require longer  follow-up to exclude indolent adenocarcinoma.  *Recommendations based on Guidelines for the Management of Incidental  Pulmonary Nodules Detected at CT: From the Fleischner Society 2017,  Radiology 2017.       MARA MEDRANO MD         SYSTEM ID:  [id number]         Follow up/instructions: Patient will need to follow-up her hypoxia at her TCU and wean her oxygen as appropriately.  Ongoing polypharmacy also needs to be paid attention to.    Pending test results at discharge:      Unresulted Labs Ordered in the Past  30 Days of this Admission       No orders found from 12/20/2023 to 1/20/2024.            Discharge Orders      General info for SNF    Length of Stay Estimate: Short Term Care: Estimated # of Days <30  Condition at Discharge: Improving  Level of care:skilled   Rehabilitation Potential: Fair  Admission H&P remains valid and up-to-date: Yes  Recent Chemotherapy: N/A  Use Nursing Home Standing Orders: Yes     Mantoux instructions    Give two-step Mantoux (PPD) Per Facility Policy Yes     Follow Up and recommended labs and tests    Follow up with Nursing home physician.  No follow up labs or test are needed. Follow up hypoxia     Reason for your hospital stay    Here for pneumonia and hypoxia     Activity - Up with assistive device     Physical Therapy Adult Consult    Evaluate and treat as clinically indicated.    Reason:  deconditioning     Occupational Therapy Adult Consult    Evaluate and treat as clinically indicated.    Reason:  cognitive eval     Oxygen (SNF/TCU) Discharge     Fall precautions     Diet    Follow this diet upon discharge: Orders Placed This Encounter      Regular Diet Adult       Discharge Disposition   Discharged to short-term care facility  Condition at discharge: Stable      Consultations This Hospital Stay   PHYSICAL THERAPY ADULT IP CONSULT  OCCUPATIONAL THERAPY ADULT IP CONSULT  CARE MANAGEMENT / SOCIAL WORK IP CONSULT  PHYSICAL THERAPY ADULT IP CONSULT  OCCUPATIONAL THERAPY ADULT IP CONSULT    Code Status   No CPR- Do NOT Intubate    Time Spent on this Encounter   I, Isiah Ray MD, personally saw the patient today and spent less than or equal to 30 minutes discharging this patient.  Discussed with social work, therapy, nursing, team rounds           This document was created using voice recognition technology.  Please excuse any typographical errors that may have occurred.  Please call with any questions.       Isiah Ray MD  [facility] OBSERVATION DEPT  [address]  [facility] [address]  Phone: [phone]  ______________________________________________________________________    Physical Exam   Vital Signs: Temp: 98.8  F (37.1  C) Temp src: Oral BP: (!) 147/74 Pulse: 71   Resp: 20 SpO2: 92 % O2 Device: Nasal cannula Oxygen Delivery: 1.5 LPM  Weight: 169 lbs 1.49 oz    Exam is really stable from yesterday  General appearance: Patient is alert and oriented x2, somewhat confused but better today, sitting up in bed, appears ill, pleasant and conversing normally, speaking in full sentences, appears stated age and is frail  HEENT:    Mucous membranes are moist  RESPIRATORY: Patient with some rhonchi with an improved cough daily, no significant wheezing currently, moderate air movement, still wearing nasal cannula  CARDIOVASCULAR: Regular rate and rhythm, normal S1/S2, no murmurs   GASTROINTESTINAL: Non-distended, non-tender, soft, bowel sounds present throughout  NEUROLOGIC:  Cranial nerves II-XII intact, without any focal deficits, strength 5/5 throughout, globally weak  EXTREMITIES:  Moves all extremities, no clubbing, cyanosis, nor edema  :  Izaguirre not present            Discharge Medications   Current Discharge Medication List        START taking these medications    Details   cefdinir (OMNICEF) 300 MG capsule Take 1 capsule (300 mg) by mouth 2 times daily for 3 days    Associated Diagnoses: Community acquired pneumonia of left upper lobe of lung      doxycycline hyclate (VIBRAMYCIN) 100 MG capsule Take 1 capsule (100 mg) by mouth every 12 hours for 3 days    Associated Diagnoses: Community acquired pneumonia of left upper lobe of lung      guaiFENesin (MUCINEX) 600 MG 12 hr tablet Take 1 tablet (600 mg) by mouth 2 times daily for 5 days    Associated Diagnoses: Community acquired pneumonia of left upper lobe of lung      guaiFENesin-dextromethorphan (ROBITUSSIN DM) 100-10 MG/5ML syrup Take 10 mLs by mouth every 4 hours as needed for cough    Associated  Diagnoses: Community acquired pneumonia of left upper lobe of lung           CONTINUE these medications which have CHANGED    Details   albuterol (PROAIR HFA/PROVENTIL HFA/VENTOLIN HFA) 108 (90 Base) MCG/ACT inhaler Inhale 2 puffs into the lungs every 4 hours as needed for shortness of breath, wheezing or cough  Qty: 18 g, Refills: 0    Comments: Pharmacy may dispense brand covered by insurance (Proair, or proventil or ventolin or generic albuterol inhaler)  Associated Diagnoses: Community acquired pneumonia of left upper lobe of lung           CONTINUE these medications which have NOT CHANGED    Details   acetaminophen (TYLENOL) 325 MG tablet Take 2 tablets (650 mg) by mouth every 6 hours as needed for mild pain or other (and adjunct with moderate or severe pain or per patient request)    Associated Diagnoses: Hives      CITALOPRAM HYDROBROMIDE PO Take 40 mg by mouth At Bedtime      EPINEPHrine (ANY BX GENERIC EQUIV) 0.3 MG/0.3ML injection 2-pack Inject 0.3 mLs (0.3 mg) into the muscle once as needed for anaphylaxis May repeat one time in 5-15 minutes if response to initial dose is inadequate.  Qty: 2 each, Refills: 0      fexofenadine (ALLEGRA) 60 MG tablet Take 60 mg by mouth 2 times daily as needed      FLOVENT DISKUS 100 MCG/ACT inhaler 1 puff 2 times daily as needed      vitamin D3 (CHOLECALCIFEROL) 50 mcg (2000 units) tablet Take 1 tablet by mouth daily           Allergies   No Known Allergies

## 2024-01-22 NOTE — PROGRESS NOTES
[facility]    Patient is currently receiving home care services from [facility]. The patient is currently receiving SN PT HHA services.  and home health team have been notified of patient admission. [facility] Liaison will continue to follow patient during stay. If appropriate provide orders to resume home care at time of discharge. Please note end of care episode is 1/24/24.     SILVANA Fountain, LPN  Home Care Liaison   Cell: [phone]

## 2024-01-23 ENCOUNTER — PATIENT OUTREACH (OUTPATIENT)
Dept: CARE COORDINATION | Facility: CLINIC | Age: 84
End: 2024-01-23
Payer: COMMERCIAL

## 2024-01-23 ENCOUNTER — LAB REQUISITION (OUTPATIENT)
Dept: LAB | Facility: CLINIC | Age: 84
End: 2024-01-23
Payer: COMMERCIAL

## 2024-01-23 DIAGNOSIS — E78.5 HYPERLIPIDEMIA, UNSPECIFIED: ICD-10-CM

## 2024-01-23 DIAGNOSIS — J96.01 ACUTE RESPIRATORY FAILURE WITH HYPOXIA (H): ICD-10-CM

## 2024-01-23 DIAGNOSIS — R91.1 SOLITARY PULMONARY NODULE: ICD-10-CM

## 2024-01-23 DIAGNOSIS — E66.3 OVERWEIGHT: ICD-10-CM

## 2024-01-23 DIAGNOSIS — J18.1 LOBAR PNEUMONIA, UNSPECIFIED ORGANISM (H): ICD-10-CM

## 2024-01-23 PROCEDURE — P9604 ONE-WAY ALLOW PRORATED TRIP: HCPCS | Mod: ORL | Performed by: FAMILY MEDICINE

## 2024-01-23 NOTE — PROGRESS NOTES
Connected [facility] Resource Center: Connected [facility] Resource [place]    Background: Transitional Care Management program identified per system criteria and reviewed by Connected [facility] Resource [facility] team for possible outreach.    Assessment: Upon chart review, CCRC Team member will not proceed with patient outreach related to this episode of Transitional Care Management program due to reason below:    Patient has discharged to a Memory Care, Long-term Care, Assisted Living or Group Home where patient is receiving on-site support with their daily cares, including support with hospital follow up plan.    Plan: Transitional Care Management episode addressed appropriately per reason noted above.      LUPE Millan  Connected Care Resource [facility]    *Connected Care Resource Team does NOT follow patient ongoing. Referrals are identified based on internal discharge reports and the outreach is to ensure patient has an understanding of their discharge instructions.

## 2024-01-23 NOTE — PLAN OF CARE
"Occupational Therapy Discharge Summary    Reason for therapy discharge:    Discharged to transitional care facility.    Progress towards therapy goal(s). See goals on Care Plan in [facility] electronic health record for goal details.  Goals not met.  Barriers to achieving goals:   discharge from facility.    Therapy recommendation(s):    Continued therapy is recommended.  Rationale/Recommendations:  per treating OT \"Pt. lives in home with dtr. and family who complete IADLS and can help with some ADLs.  Pt. does have DME for bathroom and ambulation but is home alone at times.  Pt. currently presents with generalized weakness and SOB impairing previus level of tolerance and independence with ADLs and needing A for transfers, dressing, toileting, and setup for G/H.  Recommend continued skilled IP OT and discharge to TCU to advance independence and tolerance for safe ADL participation.  If pt. able to have family home to assist 24/7, pt. could discharge home with home OT\".    **This patient was not seen by writing OT, information for discharge summary taken from treating OT's notes.**    "

## 2024-01-24 PROCEDURE — 86481 TB AG RESPONSE T-CELL SUSP: CPT | Mod: ORL | Performed by: FAMILY MEDICINE

## 2024-01-24 PROCEDURE — P9604 ONE-WAY ALLOW PRORATED TRIP: HCPCS | Mod: ORL | Performed by: FAMILY MEDICINE

## 2024-01-24 PROCEDURE — 36415 COLL VENOUS BLD VENIPUNCTURE: CPT | Mod: ORL | Performed by: FAMILY MEDICINE

## 2024-01-25 LAB
GAMMA INTERFERON BACKGROUND BLD IA-ACNC: 0.02 IU/ML
M TB IFN-G BLD-IMP: NEGATIVE
M TB IFN-G CD4+ BCKGRND COR BLD-ACNC: 3.73 IU/ML
MITOGEN IGNF BCKGRD COR BLD-ACNC: 0.01 IU/ML
MITOGEN IGNF BCKGRD COR BLD-ACNC: 0.02 IU/ML
QUANTIFERON MITOGEN: 3.75 IU/ML
QUANTIFERON NIL TUBE: 0.02 IU/ML
QUANTIFERON TB1 TUBE: 0.04 IU/ML
QUANTIFERON TB2 TUBE: 0.03

## 2024-02-02 ENCOUNTER — LAB REQUISITION (OUTPATIENT)
Dept: LAB | Facility: CLINIC | Age: 84
End: 2024-02-02
Payer: COMMERCIAL

## 2024-02-02 DIAGNOSIS — D72.829 ELEVATED WHITE BLOOD CELL COUNT, UNSPECIFIED: ICD-10-CM

## 2024-02-02 DIAGNOSIS — R09.02 HYPOXEMIA: ICD-10-CM

## 2024-02-05 LAB
ANION GAP SERPL CALCULATED.3IONS-SCNC: 9 MMOL/L (ref 7–15)
BUN SERPL-MCNC: 22.2 MG/DL (ref 8–23)
CALCIUM SERPL-MCNC: 9.3 MG/DL (ref 8.8–10.2)
CHLORIDE SERPL-SCNC: 104 MMOL/L (ref 98–107)
CREAT SERPL-MCNC: 0.95 MG/DL (ref 0.51–0.95)
DEPRECATED HCO3 PLAS-SCNC: 26 MMOL/L (ref 22–29)
EGFRCR SERPLBLD CKD-EPI 2021: 59 ML/MIN/1.73M2
ERYTHROCYTE [DISTWIDTH] IN BLOOD BY AUTOMATED COUNT: 13.3 % (ref 10–15)
GLUCOSE SERPL-MCNC: 86 MG/DL (ref 70–99)
HCT VFR BLD AUTO: 39.1 % (ref 35–47)
HGB BLD-MCNC: 12.7 G/DL (ref 11.7–15.7)
MCH RBC QN AUTO: 30.5 PG (ref 26.5–33)
MCHC RBC AUTO-ENTMCNC: 32.5 G/DL (ref 31.5–36.5)
MCV RBC AUTO: 94 FL (ref 78–100)
PLATELET # BLD AUTO: 245 10E3/UL (ref 150–450)
POTASSIUM SERPL-SCNC: 4.4 MMOL/L (ref 3.4–5.3)
RBC # BLD AUTO: 4.17 10E6/UL (ref 3.8–5.2)
SODIUM SERPL-SCNC: 139 MMOL/L (ref 135–145)
WBC # BLD AUTO: 7.4 10E3/UL (ref 4–11)

## 2024-02-05 PROCEDURE — 80048 BASIC METABOLIC PNL TOTAL CA: CPT | Mod: ORL | Performed by: FAMILY MEDICINE

## 2024-02-05 PROCEDURE — 85027 COMPLETE CBC AUTOMATED: CPT | Mod: ORL | Performed by: FAMILY MEDICINE

## 2024-02-05 PROCEDURE — 36415 COLL VENOUS BLD VENIPUNCTURE: CPT | Mod: ORL | Performed by: FAMILY MEDICINE

## 2024-07-10 ENCOUNTER — TELEPHONE (OUTPATIENT)
Dept: DERMATOLOGY | Facility: CLINIC | Age: 84
End: 2024-07-10

## 2024-07-10 NOTE — TELEPHONE ENCOUNTER
[facility] Call Center    Phone Message    May a detailed message be left on voicemail: yes     Reason for Call: Symptoms or Concerns     If patient has red-flag symptoms, warm transfer to triage line    Current symptom or concern: pt was scheduled for today 07/10/24. Pt is ill and had to cancel and reschedule appt   Pt rescheduled for Dec and has a concerning spot on hand.       Symptoms have been present for:  2+ week(s)    Has patient previously been seen for this? No    By : NA    Date: NA    Are there any new or worsening symptoms? No    Action Taken: Message routed to:  Other: Ox derm    Travel Screening: Not Applicable     Date of Service:

## 2024-07-17 ENCOUNTER — OFFICE VISIT (OUTPATIENT)
Dept: DERMATOLOGY | Facility: CLINIC | Age: 84
End: 2024-07-17
Payer: COMMERCIAL

## 2024-07-17 DIAGNOSIS — L57.0 ACTINIC KERATOSIS: ICD-10-CM

## 2024-07-17 DIAGNOSIS — Z85.828 HX OF SQUAMOUS CELL CARCINOMA OF SKIN: ICD-10-CM

## 2024-07-17 DIAGNOSIS — D48.5 NEOPLASM OF UNCERTAIN BEHAVIOR OF SKIN: Primary | ICD-10-CM

## 2024-07-17 DIAGNOSIS — L50.9 URTICARIA: ICD-10-CM

## 2024-07-17 PROCEDURE — 11102 TANGNTL BX SKIN SINGLE LES: CPT | Performed by: STUDENT IN AN ORGANIZED HEALTH CARE EDUCATION/TRAINING PROGRAM

## 2024-07-17 PROCEDURE — 88305 TISSUE EXAM BY PATHOLOGIST: CPT | Performed by: DERMATOLOGY

## 2024-07-17 PROCEDURE — 17000 DESTRUCT PREMALG LESION: CPT | Mod: XS | Performed by: STUDENT IN AN ORGANIZED HEALTH CARE EDUCATION/TRAINING PROGRAM

## 2024-07-17 PROCEDURE — 99214 OFFICE O/P EST MOD 30 MIN: CPT | Mod: 25 | Performed by: STUDENT IN AN ORGANIZED HEALTH CARE EDUCATION/TRAINING PROGRAM

## 2024-07-17 PROCEDURE — 17003 DESTRUCT PREMALG LES 2-14: CPT | Performed by: STUDENT IN AN ORGANIZED HEALTH CARE EDUCATION/TRAINING PROGRAM

## 2024-07-17 RX ORDER — NIACINAMIDE 500 MG
500 TABLET ORAL 2 TIMES DAILY WITH MEALS
Qty: 60 TABLET | Refills: 11 | Status: SHIPPED | OUTPATIENT
Start: 2024-07-17

## 2024-07-17 NOTE — PROGRESS NOTES
[facility] Dermatology Note    Encounter Date: Jul 17, 2024    Dermatology Problem List:  #AKs     #Hives  - TSH anti IGE negative. Chronic intermittent. Respond to antihistamines. Allegra 4x daily    Major PMHx  -   ______________________________________    Impression/Plan:  Keisha was seen today for derm problem.    Diagnoses and all orders for this visit:    Neoplasm of uncertain behavior of skin  -     Dermatological Path Order and Indications; Standing  -     Dermatological Path Order and Indications  - R dorsal hand  - shave bx SCC      Hx of squamous cell carcinoma of skin  -     niacinamide 500 MG tablet; Take 1 tablet (500 mg) by mouth 2 times daily (with meals)  - start 500mg BID     Actinic keratosis  - x6 on forearms    Urticaria  - continue 180mg BID       - SHAVE BIOPSY PROCEDURE NOTE: After written informed consent was obtained, a time out was taken to identify the patient and the correct site for biopsy. The lesion on the R dorsal hand was cleansed with a 70% isopropyl alcohol wipe, and then injected with 2 cc of lidocaine 1% with epinephrine 1:100,000. Once anesthesia was ensured, the visible surface of the lesion was biopsied using a Dudley blade in standard technique. Hemostasis was obtained with pressure and aluminum chloride 20% solution. The specimen was placed in a labeled formalin container and sent to pathology for sectioning and analysis. The wound was dressed with white petrolatum and an adhesive bandage. The patient tolerated the procedure well. Post-procedure instructions and recommendations were provided both verbally and in writing.    Follow-up in 6 mo.       Staff Involved:  Staff Only    Romulo Walsh MD   of Dermatology  Department of Dermatology  [facility]      CC:   Chief Complaint   Patient presents with    Derm Problem     Skin check        History of Present Illness:  Ms. Keisha Kolb is a 84 year  old female who presents as a return patient.    Patient was last seen in February 2024 at that time we froze actinic keratoses, continued Allegra 1-2 pill twice daily as needed for the control of urticaria.  A lesion from the right dorsal hand was biopsied and returned as an SCC this was subsequently treated with Mohs surgery October 2023    Labs:      Physical exam:  Vitals: There were no vitals taken for this visit.  GEN: well developed, well-nourished, in no acute distress, in a pleasant mood.     SKIN: Gregory phototype 1  - Full skin, which includes the head/face, both arms, chest, back, abdomen,both legs, genitalia and/or groin buttocks, digits and/or nails, was examined.  - gritty pink papules on forearms  - pink nodule R dorsal hand   - No other lesions of concern on areas examined.     Past Medical History:   Past Medical History:   Diagnosis Date    Anxiety     Squamous cell carcinoma of skin, unspecified      Past Surgical History:   Procedure Laterality Date    IR LUMBAR PUNCTURE  3/7/2022    IR LUMBAR PUNCTURE  12/19/2022    IR LUMBAR PUNCTURE  5/19/2023    IR LUMBAR PUNCTURE  2/28/2020    IR LUMBAR PUNCTURE  7/10/2020    IR LUMBAR PUNCTURE  11/6/2020    masectomy         Social History:   reports that she has quit smoking. Her smoking use included cigarettes. She has never used smokeless tobacco. She reports that she does not drink alcohol and does not use drugs.    Family History:  Family History   Problem Relation Age of Onset    Squamous cell carcinoma Daughter     Squamous cell carcinoma Daughter        Medications:  Current Outpatient Medications   Medication Sig Dispense Refill    acetaminophen (TYLENOL) 325 MG tablet Take 2 tablets (650 mg) by mouth every 6 hours as needed for mild pain or other (and adjunct with moderate or severe pain or per patient request)      albuterol (PROAIR HFA/PROVENTIL HFA/VENTOLIN HFA) 108 (90 Base) MCG/ACT inhaler Inhale 2 puffs into the lungs every 4 hours as  needed for shortness of breath, wheezing or cough 18 g 0    CITALOPRAM HYDROBROMIDE PO Take 40 mg by mouth At Bedtime      EPINEPHrine (ANY BX GENERIC EQUIV) 0.3 MG/0.3ML injection 2-pack Inject 0.3 mLs (0.3 mg) into the muscle once as needed for anaphylaxis May repeat one time in 5-15 minutes if response to initial dose is inadequate. 2 each 0    fexofenadine (ALLEGRA) 60 MG tablet Take 60 mg by mouth 2 times daily as needed      FLOVENT DISKUS 100 MCG/ACT inhaler 1 puff 2 times daily as needed      guaiFENesin-dextromethorphan (ROBITUSSIN DM) 100-10 MG/5ML syrup Take 10 mLs by mouth every 4 hours as needed for cough      niacinamide 500 MG tablet Take 1 tablet (500 mg) by mouth 2 times daily (with meals) 60 tablet 11    vitamin D3 (CHOLECALCIFEROL) 50 mcg (2000 units) tablet Take 1 tablet by mouth daily       No Known Allergies

## 2024-07-17 NOTE — LETTER
"July 24, 2024      Keisha QUINONES Kennedi  [address] [facility] [address]        Dear ,    We are writing to inform you of your test results.    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  This means that you do not have to undergo Mohs surgery for the treatment of the spot!       Resulted Orders   Dermatological Path Order and Indications   Result Value Ref Range    Case Report       Surgical Pathology Report                         Case: [id number]                                  Authorizing Provider:  Romulo Walsh MD          Collected:           07/17/2024 01:42 PM          Ordering Location:     [facility]   Received:            07/17/2024 04:44 PM                                 [facility]                                                           Pathologist:           Goran Chacko MD                                                         Specimen:    Skin, R dorsal hand                                                                        Final Diagnosis       Right dorsal hand:  - Hypertrophic actinic keratosis with overlying cutaneous horn - (see description)      Clinical Information       The patient is an 84 year old female.       Gross Description       A(1). Skin, R dorsal hand:  The specimen is received in formalin with proper patient identification, labeled \"R dorsal hand\".  The specimen consists of a 1.0 x 0.9 cm white-tan skin shave remarkable for a 0.6 x 0.2 x 0.2 cm raised brown-tan lesion.  The subcutaneous surface is inked black, and the specimen is serially sectioned and entirely submitted in cassette A1.       Microscopic Description       The specimen exhibits parakeratosis forming a vertical column, mild epidermal hyperplasia and mild keratinocyte atypia in the lower half of the epidermis.      " Performing Labs       The technical component of this testing was completed at [facility].    Stain controls for all stains resulted within this report have been reviewed and show appropriate reactivity.          If you have any questions or concerns, please call the clinic at the number listed above.       Sincerely,      Romulo Walsh MD

## 2024-07-17 NOTE — PATIENT INSTRUCTIONS
Wound Care After a Biopsy    What is a skin biopsy?  A skin biopsy allows the doctor to examine a very small piece of tissue under the microscope to determine the diagnosis and the best treatment for the skin condition. A local anesthetic (numbing medicine) is injected with a very small needle into the skin area to be tested. A small piece of skin is taken from the area. Sometimes a suture (stitch) is used.     What are the risks of a skin biopsy?  I will experience scar, bleeding, swelling, pain, crusting and redness. I may experience incomplete removal or recurrence. Risks of this procedure are excessive bleeding, bruising, infection, nerve damage, numbness, thick (hypertrophic or keloidal) scar and non-diagnostic biopsy.    How should I care for my wound for the first 24 hours?  Keep the wound dry and covered for 24 hours  If it bleeds, hold direct pressure on the area for 15 minutes. If bleeding does not stop, call us or go to the emergency room  Avoid strenuous exercise the first 1-2 days or as your doctor instructs you    How should I care for the wound after 24 hours?  After 24 hours, remove the bandage  You may bathe or shower as normal  If you had a scalp biopsy, you can shampoo as usual and can use shower water to clean the biopsy site daily  Clean the wound once a day with gentle soap and water  Do not scrub, be gentle  Apply white petroleum/Vaseline after cleaning the wound with a cotton swab or a clean finger, and keep the site covered with a Bandaid /bandage. Bandages are not necessary with a scalp biopsy  If you are unable to cover the site with a Bandaid /bandage, re-apply ointment 2-3 times a day to keep the site moist. Moisture will help with healing  Avoid strenuous activity for first 1-2 days  Avoid lakes, rivers, pools, and oceans until the stitches are removed or the site is healed    How do I clean my wound?  Wash hands thoroughly with soap or use hand  before all wound care  Clean  the wound with gentle soap and water  Apply white petroleum/Vaseline  to wound after it is clean  Replace the Bandaid /bandage to keep the wound covered for the first few days or as instructed by your doctor  If you had a scalp biopsy, warm shower water to the area on a daily basis should suffice    What should I use to clean my wound?   Cotton-tipped applicators (Qtips )  White petroleum jelly (Vaseline ). Use a clean new container and use Q-tips to apply.  Bandaids  as needed  Gentle soap     How should I care for my wound long term?  Do not get your wound dirty  Keep up with wound care for one week or until the area is healed.  A small scab will form and fall off by itself when the area is completely healed. The area will be red and will become pink in color as it heals. Sun protection is very important for how your scar will turn out. Sunscreen with an SPF 30 or greater is recommended once the area is healed.  You should have some soreness but it should be mild and slowly go away over several days. Talk to your doctor about using tylenol for pain,    When should I call my doctor?  If you have increased:   Pain or swelling  Pus or drainage (clear or slightly yellow drainage is ok)  Temperature over 100F  Spreading redness or warmth around wound    When will I hear about my results?  The biopsy results can take 2 weeks to come back.  Your results will automatically release to [employer] before your provider has even reviewed them.  The clinic will call you with the results, send you a [employer] message, or have you schedule a follow-up clinic or phone time to discuss the results.  Contact our clinics if you do not hear from us in 2 weeks.    Who should I call with questions?  [facility]: [phone]  [facility]: [phone]  For urgent needs outside of business hours call the [facility] at [phone] and ask for the dermatology resident on call

## 2024-07-17 NOTE — LETTER
7/17/2024      Keisha Kolb  [address] [facility] [address]      Dear Colleague,    Thank you for referring your patient, Keisha Kolb, to the [facility]. Please see a copy of my visit note below.    [facility] Dermatology Note    Encounter Date: Jul 17, 2024    Dermatology Problem List:  #AKs     #Hives  - TSH anti IGE negative. Chronic intermittent. Respond to antihistamines. Allegra 4x daily    Major PMHx  -   ______________________________________    Impression/Plan:  Keisha was seen today for derm problem.    Diagnoses and all orders for this visit:    Neoplasm of uncertain behavior of skin  -     Dermatological Path Order and Indications; Standing  -     Dermatological Path Order and Indications  - R dorsal hand  - shave bx SCC      Hx of squamous cell carcinoma of skin  -     niacinamide 500 MG tablet; Take 1 tablet (500 mg) by mouth 2 times daily (with meals)  - start 500mg BID     Actinic keratosis  - x6 on forearms    Urticaria  - continue 180mg BID       - SHAVE BIOPSY PROCEDURE NOTE: After written informed consent was obtained, a time out was taken to identify the patient and the correct site for biopsy. The lesion on the R dorsal hand was cleansed with a 70% isopropyl alcohol wipe, and then injected with 2 cc of lidocaine 1% with epinephrine 1:100,000. Once anesthesia was ensured, the visible surface of the lesion was biopsied using a [place] blade in standard technique. Hemostasis was obtained with pressure and aluminum chloride 20% solution. The specimen was placed in a labeled formalin container and sent to pathology for sectioning and analysis. The wound was dressed with white petrolatum and an adhesive bandage. The patient tolerated the procedure well. Post-procedure instructions and recommendations were provided both verbally and in writing.    Follow-up in 6 mo.       Staff Involved:  Staff Only    Romulo Walsh  MD   of Dermatology  Department of Dermatology  [facility]      CC:   Chief Complaint   Patient presents with     Derm Problem     Skin check        History of Present Illness:  Ms. Keisha Kolb is a 84 year old female who presents as a return patient.    Patient was last seen in February 2024 at that time we froze actinic keratoses, continued Allegra 1-2 pill twice daily as needed for the control of urticaria.  A lesion from the right dorsal hand was biopsied and returned as an SCC this was subsequently treated with Mohs surgery October 2023    Labs:      Physical exam:  Vitals: There were no vitals taken for this visit.  GEN: well developed, well-nourished, in no acute distress, in a pleasant mood.     SKIN: Gregory phototype 1  - Full skin, which includes the head/face, both arms, chest, back, abdomen,both legs, genitalia and/or groin buttocks, digits and/or nails, was examined.  - gritty pink papules on forearms  - pink nodule R dorsal hand   - No other lesions of concern on areas examined.     Past Medical History:   Past Medical History:   Diagnosis Date     Anxiety      Squamous cell carcinoma of skin, unspecified      Past Surgical History:   Procedure Laterality Date     IR LUMBAR PUNCTURE  3/7/2022     IR LUMBAR PUNCTURE  12/19/2022     IR LUMBAR PUNCTURE  5/19/2023     IR LUMBAR PUNCTURE  2/28/2020     IR LUMBAR PUNCTURE  7/10/2020     IR LUMBAR PUNCTURE  11/6/2020     masectomy         Social History:   reports that she has quit smoking. Her smoking use included cigarettes. She has never used smokeless tobacco. She reports that she does not drink alcohol and does not use drugs.    Family History:  Family History   Problem Relation Age of Onset     Squamous cell carcinoma Daughter      Squamous cell carcinoma Daughter        Medications:  Current Outpatient Medications   Medication Sig Dispense Refill     acetaminophen (TYLENOL) 325 MG tablet  Take 2 tablets (650 mg) by mouth every 6 hours as needed for mild pain or other (and adjunct with moderate or severe pain or per patient request)       albuterol (PROAIR HFA/PROVENTIL HFA/VENTOLIN HFA) 108 (90 Base) MCG/ACT inhaler Inhale 2 puffs into the lungs every 4 hours as needed for shortness of breath, wheezing or cough 18 g 0     CITALOPRAM HYDROBROMIDE PO Take 40 mg by mouth At Bedtime       EPINEPHrine (ANY BX GENERIC EQUIV) 0.3 MG/0.3ML injection 2-pack Inject 0.3 mLs (0.3 mg) into the muscle once as needed for anaphylaxis May repeat one time in 5-15 minutes if response to initial dose is inadequate. 2 each 0     fexofenadine (ALLEGRA) 60 MG tablet Take 60 mg by mouth 2 times daily as needed       FLOVENT DISKUS 100 MCG/ACT inhaler 1 puff 2 times daily as needed       guaiFENesin-dextromethorphan (ROBITUSSIN DM) 100-10 MG/5ML syrup Take 10 mLs by mouth every 4 hours as needed for cough       niacinamide 500 MG tablet Take 1 tablet (500 mg) by mouth 2 times daily (with meals) 60 tablet 11     vitamin D3 (CHOLECALCIFEROL) 50 mcg (2000 units) tablet Take 1 tablet by mouth daily       No Known Allergies              Again, thank you for allowing me to participate in the care of your patient.        Sincerely,        Romulo Walsh MD

## 2024-07-19 LAB
PATH REPORT.COMMENTS IMP SPEC: NORMAL
PATH REPORT.COMMENTS IMP SPEC: NORMAL
PATH REPORT.FINAL DX SPEC: NORMAL
PATH REPORT.GROSS SPEC: NORMAL
PATH REPORT.MICROSCOPIC SPEC OTHER STN: NORMAL
PATH REPORT.RELEVANT HX SPEC: NORMAL

## 2024-08-04 ENCOUNTER — HEALTH MAINTENANCE LETTER (OUTPATIENT)
Age: 84
End: 2024-08-04

## 2025-03-13 ENCOUNTER — OFFICE VISIT (OUTPATIENT)
Dept: DERMATOLOGY | Facility: CLINIC | Age: 85
End: 2025-03-13
Payer: COMMERCIAL

## 2025-03-13 DIAGNOSIS — D48.9 NEOPLASM OF UNCERTAIN BEHAVIOR: ICD-10-CM

## 2025-03-13 ASSESSMENT — PAIN SCALES - GENERAL: PAINLEVEL_OUTOF10: NO PAIN (0)

## 2025-03-13 NOTE — PROGRESS NOTES
[facility] Dermatology Note  Encounter Date: Mar 13, 2025  Office Visit     Reviewed patients past medical history and pertinent chart review prior to patients visit today.     Dermatology Problem List:  # Neoplasm of uncertain behavior: left anterior forearm, s/p shave biopsy 3/13/2025   #AKs  #Hives  - TSH anti IGE negative. Chronic intermittent. Respond to antihistamines. 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  ____________________________________________    Assessment & Plan:     # Neoplasm of uncertain behavior:  left anterior forearm.  DDx includes SCC keratoacanthoma type. Shave biopsy today.  Photograph obtained.  Procedure Note: Biopsy by shave technique  The risks and benefits of the procedure were described to the patient. These include but are not limited to bleeding, infection, scar, incomplete removal, and non-diagnostic biopsy. Verbal informed consent was obtained. The above site(s) was cleansed with an alcohol pad and injected with 1% lidocaine with epinephrine. Once anesthesia was obtained, a biopsy(ies) was performed with Gilette blade. The tissue(s) was placed in a labeled container(s) with formalin and sent to pathology. Hemostasis was achieved with aluminum chloride. Vaseline and a bandage were applied to the wound(s). The patient tolerated the procedure well and was given post biopsy care instructions.    Follow up: pending biopsy results.     All risks, benefits and alternatives were discussed with patient.  Patient is in agreement and understands the assessment and plan.  All questions were answered.  Claudia Yang PA-C  [facility]  Dermatology  _______________________________________    CC: Derm Problem (Left arm lesion for 2 weeks growing fast. )     HPI:  Ms. Keisha Kolb is a(n) 84 year old female who presents today as a return patient for evaluation of a skin lesion involving the left arm.  She is accompanied by her grand daughter.  This lesion has been present for 2 weeks. It does occasionally itch, it does not bleed or cause pain. It has grown quickly.    Patient is otherwise feeling well, without additional skin concerns.    Physical Exam:  SKIN: Focused examination of left anterior forearm only was performed per patient request.  - left anterior forearm, 1.1 cm pink dome-shaped nodule with a central hemorrhagic crust.    - No other lesions of concern on areas examined.         Medications:  Current Outpatient Medications   Medication Sig Dispense Refill    acetaminophen (TYLENOL) 325 MG tablet Take 2 tablets (650 mg) by mouth every 6 hours as needed for mild pain or other (and adjunct with moderate or severe pain or per patient request)      albuterol (PROAIR HFA/PROVENTIL HFA/VENTOLIN HFA) 108 (90 Base) MCG/ACT inhaler Inhale 2 puffs into the lungs every 4 hours as needed for shortness of breath, wheezing or cough 18 g 0    CITALOPRAM HYDROBROMIDE PO Take 40 mg by mouth At Bedtime      EPINEPHrine (ANY BX GENERIC EQUIV) 0.3 MG/0.3ML injection 2-pack Inject 0.3 mLs (0.3 mg) into the muscle once as needed for anaphylaxis May repeat one time in 5-15 minutes if response to initial dose is inadequate. 2 each 0    fexofenadine (ALLEGRA) 60 MG tablet Take 60 mg by mouth 2 times daily as needed      vitamin D3 (CHOLECALCIFEROL) 50 mcg (2000 units) tablet Take 1 tablet by mouth daily      FLOVENT DISKUS 100 MCG/ACT inhaler 1 puff 2 times daily as needed (Patient not taking: Reported on 3/13/2025)      guaiFENesin-dextromethorphan (ROBITUSSIN DM) 100-10 MG/5ML syrup Take 10 mLs by mouth every 4 hours as needed for cough (Patient not  taking: Reported on 3/13/2025)      niacinamide 500 MG tablet Take 1 tablet (500 mg) by mouth 2 times daily (with meals) (Patient not taking: Reported on 3/13/2025) 60 tablet 11     No current facility-administered medications for this visit.      Past Medical History:   Patient Active Problem List   Diagnosis    Anxiety state    Disorder of bone and cartilage    Ductal carcinoma in situ of left breast    Pure hypercholesterolemia    Cellulitis of right lower extremity    Aspiration pneumonia, unspecified aspiration pneumonia type, unspecified laterality, unspecified part of lung (H)    Acute respiratory failure with hypoxia (H)    Fall, initial encounter    Altered mental status, unspecified altered mental status type    Acute cough    Shortness of breath    Generalized weakness    RSV infection    Community acquired pneumonia of left upper lobe of lung     Past Medical History:   Diagnosis Date    Anxiety     Squamous cell carcinoma of skin, unspecified        CC Referred Self, MD  No address on file on close of this encounter.

## 2025-03-13 NOTE — PATIENT INSTRUCTIONS
Wound Care After a Biopsy    What is a skin biopsy?  A skin biopsy allows the doctor to examine a very small piece of tissue under the microscope to determine the diagnosis and the best treatment for the skin condition. A local anesthetic (numbing medicine)  is injected with a very small needle into the skin area to be tested. A small piece of skin is taken from the area. Sometimes a suture (stitch) is used.     What are the risks of a skin biopsy?  I will experience scar, bleeding, swelling, pain, crusting and redness. I may experience incomplete removal or recurrence. Risks of this procedure are excessive bleeding, bruising, infection, nerve damage, numbness, thick (hypertrophic or keloidal) scar and non-diagnostic biopsy.    How should I care for my wound for the first 24 hours?  Keep the wound dry and covered for 24 hours  If it bleeds, hold direct pressure on the area for 15 minutes. If bleeding does not stop then go to the emergency room  Avoid strenuous exercise the first 1-2 days or as your doctor instructs you    How should I care for the wound after 24 hours?  After 24 hours, remove the bandage  You may bathe or shower as normal  If you had a scalp biopsy, you can shampoo as usual and can use shower water to clean the biopsy site daily  Clean the wound twice a day with gentle soap and water  Do not scrub, be gentle  Apply white petroleum/Vaseline after cleaning the wound with a cotton swab or a clean finger, and keep the site covered with a Bandaid /bandage. Bandages are not necessary with a scalp biopsy  If you are unable to cover the site with a Bandaid /bandage, re-apply ointment 2-3 times a day to keep the site moist. Moisture will help with healing  Avoid strenuous activity for first 1-2 days  Avoid lakes, rivers, pools, and oceans until the stitches are removed or the site is healed    How do I clean my wound?  Wash hands thoroughly with soap or use hand  before all wound care  Clean the  wound with gentle soap and water  Apply white petroleum/Vaseline  to wound after it is clean  Replace the Bandaid /bandage to keep the wound covered for the first few days or as instructed by your doctor  If you had a scalp biopsy, warm shower water to the area on a daily basis should suffice    What should I use to clean my wound?   Cotton-tipped applicators (Qtips )  White petroleum jelly (Vaseline ). Use a clean new container and use Q-tips to apply.  Bandaids   as needed  Gentle soap     How should I care for my wound long term?  Do not get your wound dirty  Keep up with wound care for one week or until the area is healed.  A small scab will form and fall off by itself when the area is completely healed. The area will be red and will become pink in color as it heals. Sun protection is very important for how your scar will turn out. Sunscreen with an SPF 30 or greater is recommended once the area is healed.  You should have some soreness but it should be mild and slowly go away over several days. Talk to your doctor about using tylenol for pain,    When should I call my doctor?  If you have increased:   Pain or swelling  Pus or drainage (clear or slightly yellow drainage is ok)  Temperature over 100F  Spreading redness or warmth around wound    When will I hear about my results?  The biopsy results can take 2 weeks to come back.  Your results will automatically release to [employer] before your provider has even reviewed them.  The clinic will call you with the results, send you a [employer] message, or have you schedule a follow-up clinic or phone time to discuss the results.  Contact our clinics if you do not hear from us in 2 weeks. If further treatment is needed for a skin cancer, this will be done at either our [place] or [place] office.    Who should I call with questions?  [facility]: [phone]  [facility]: [phone]  For urgent  needs outside of business hours call the [facility] at [phone] and ask for the dermatology resident on call

## 2025-03-13 NOTE — LETTER
3/13/2025      Keisha Kolb  [address] [facility] [address]      Dear Colleague,    Thank you for referring your patient, Keisha Kolb, to the [facility]. Please see a copy of my visit note below.    [facility] Dermatology Note  Encounter Date: Mar 13, 2025  Office Visit     Reviewed patients past medical history and pertinent chart review prior to patients visit today.     Dermatology Problem List:  # Neoplasm of uncertain behavior: left anterior forearm, s/p shave biopsy 3/13/2025   #AKs  #Hives  - TSH anti IGE negative. Chronic intermittent. Respond to antihistamines. 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  ____________________________________________    Assessment & Plan:     # Neoplasm of uncertain behavior:  left anterior forearm.  DDx includes SCC keratoacanthoma type. Shave biopsy today.  Photograph obtained.  Procedure Note: Biopsy by shave technique  The risks and benefits of the procedure were described to the patient. These include but are not limited to bleeding, infection, scar, incomplete removal, and non-diagnostic biopsy. Verbal informed consent was obtained. The above site(s) was cleansed with an alcohol pad and injected with 1% lidocaine with epinephrine. Once anesthesia was obtained, a biopsy(ies) was performed with Gilette blade. The tissue(s) was placed in a labeled container(s) with formalin and sent to pathology. Hemostasis was achieved with aluminum chloride. Vaseline and a bandage were applied to the wound(s). The patient tolerated the procedure well and was given post biopsy care instructions.    Follow up: pending  biopsy results.     All risks, benefits and alternatives were discussed with patient.  Patient is in agreement and understands the assessment and plan.  All questions were answered.  Claudia Yang PA-C  [facility] Dermatology  _______________________________________    CC: Derm Problem (Left arm lesion for 2 weeks growing fast. )     HPI:  Ms. Keisha Kolb is a(n) 84 year old female who presents today as a return patient for evaluation of a skin lesion involving the left arm.  She is accompanied by her grand daughter.  This lesion has been present for 2 weeks. It does occasionally itch, it does not bleed or cause pain. It has grown quickly.    Patient is otherwise feeling well, without additional skin concerns.    Physical Exam:  SKIN: Focused examination of left anterior forearm only was performed per patient request.  - left anterior forearm, 1.1 cm pink dome-shaped nodule with a central hemorrhagic crust.    - No other lesions of concern on areas examined.         Medications:  Current Outpatient Medications   Medication Sig Dispense Refill     acetaminophen (TYLENOL) 325 MG tablet Take 2 tablets (650 mg) by mouth every 6 hours as needed for mild pain or other (and adjunct with moderate or severe pain or per patient request)       albuterol (PROAIR HFA/PROVENTIL HFA/VENTOLIN HFA) 108 (90 Base) MCG/ACT inhaler Inhale 2 puffs into the lungs every 4 hours as needed for shortness of breath, wheezing or cough 18 g 0     CITALOPRAM HYDROBROMIDE PO Take 40 mg by mouth At Bedtime       EPINEPHrine (ANY BX GENERIC EQUIV) 0.3 MG/0.3ML injection 2-pack Inject 0.3 mLs (0.3 mg) into the muscle once as needed for anaphylaxis May repeat one time in 5-15 minutes if response to initial dose is inadequate. 2 each 0     fexofenadine (ALLEGRA) 60 MG tablet Take 60 mg by mouth 2 times daily as needed       vitamin D3 (CHOLECALCIFEROL) 50 mcg (2000 units) tablet Take 1 tablet by mouth daily       FLOVENT DISKUS 100  MCG/ACT inhaler 1 puff 2 times daily as needed (Patient not taking: Reported on 3/13/2025)       guaiFENesin-dextromethorphan (ROBITUSSIN DM) 100-10 MG/5ML syrup Take 10 mLs by mouth every 4 hours as needed for cough (Patient not taking: Reported on 3/13/2025)       niacinamide 500 MG tablet Take 1 tablet (500 mg) by mouth 2 times daily (with meals) (Patient not taking: Reported on 3/13/2025) 60 tablet 11     No current facility-administered medications for this visit.      Past Medical History:   Patient Active Problem List   Diagnosis     Anxiety state     Disorder of bone and cartilage     Ductal carcinoma in situ of left breast     Pure hypercholesterolemia     Cellulitis of right lower extremity     Aspiration pneumonia, unspecified aspiration pneumonia type, unspecified laterality, unspecified part of lung (H)     Acute respiratory failure with hypoxia (H)     Fall, initial encounter     Altered mental status, unspecified altered mental status type     Acute cough     Shortness of breath     Generalized weakness     RSV infection     Community acquired pneumonia of left upper lobe of lung     Past Medical History:   Diagnosis Date     Anxiety      Squamous cell carcinoma of skin, unspecified        CC Referred Self, MD  No address on file on close of this encounter.       Again, thank you for allowing me to participate in the care of your patient.        Sincerely,        Stacy Yang PA-C    Electronically signed

## 2025-03-18 ENCOUNTER — TELEPHONE (OUTPATIENT)
Dept: DERMATOLOGY | Facility: CLINIC | Age: 85
End: 2025-03-18
Payer: COMMERCIAL

## 2025-03-18 DIAGNOSIS — D48.9 NEOPLASM OF UNCERTAIN BEHAVIOR: Primary | ICD-10-CM

## 2025-03-18 NOTE — TELEPHONE ENCOUNTER
Called and spoke with pt granddaughter, discussed results and treatment and scheduled for excision    Devi FARRELL RN  Dermatology   [phone]

## 2025-03-18 NOTE — TELEPHONE ENCOUNTER
----- Message from Stacy Yang sent at 3/18/2025  9:56 AM CDT -----  Left anterior forearm, SCC, needs excision. Please place order and call patient to schedule. Thank you!

## 2025-03-19 ENCOUNTER — OFFICE VISIT (OUTPATIENT)
Dept: DERMATOLOGY | Facility: CLINIC | Age: 85
End: 2025-03-19
Payer: COMMERCIAL

## 2025-03-19 DIAGNOSIS — C44.629 SQUAMOUS CELL CANCER OF SKIN OF FOREARM, LEFT: ICD-10-CM

## 2025-03-19 DIAGNOSIS — D48.9 NEOPLASM OF UNCERTAIN BEHAVIOR: ICD-10-CM

## 2025-03-19 NOTE — LETTER
3/19/2025      Keisha Kolb  [address] [facility] [address]      Dear Colleague,    Thank you for referring your patient, Keisha Kolb, to the [facility]. Please see a copy of my visit note below.    DERMATOLOGIC SURGERY REPORT    NAME OF PROCEDURE:  EXCISION AND CLOSURE    Surgeon:  Romulo Walsh MD    PREOPERATIVE DIAGNOSIS: SCC  POSTOPERATIVE DIAGNOSIS: Same  Lesion Size: 20 mm lesion with 4 mm margins  Final excision size: 28 mm  Repair type: Complex  FINAL REPAIR LENGTH:  77 mm  Location: L forearm  Prior Biopsy Accession #: [id number]     INDICATIONS:Excision was indicated for treatment. We discussed the principles of treatment and most likely complications including bleeding, infection, wound dehiscence, pain, nerve damage, and scarring. Informed consent was obtained and the patient underwent the procedure as follows.    PROCEDURE:  The patient was taken to the operative suite. The treatment area was anesthetized with 1% lidocaine with 1:[id number] epinephrine buffered with bicarbonate. The area was washed with hibiclens, rinsed with saline and draped with sterile towels. The lesion was delineated and excised down to subcutaneous fat. Hemostasis was obtained by electrocoagulation.     REPAIR:  In order to repair this defect while maintaining the normal anatomic relations and function, we elected to utilize a linear closure. Closure was oriented so that the wound was in the patient's natural skin tension lines. Two redundant cones were removed by triangulation. Due to tightness of the surrounding skin deeper layers of the subcutaneous tissue were undermined extensively to a distance  greater than width of the defect on both sides by dissection in the subcutaneous plane until adequate tissue mobility was obtained. Deep layering suturing was performed using 3-0 monocryl deep, intradermal and subcutaneous sutures.  Final cutaneous approximation was achieved  with 3-0 monocryl running subcuticular sutures.      A total of 10 mL of anesthesia was administered for all surgical sites. Estimated blood loss was less than 5 mL for all surgical sites. A sterile pressure dressing was applied and wound care instructions, with a written handout, were given. The patient was discharged alert and ambulatory.    Romulo Walsh M.D.      Department of Dermatology       Again, thank you for allowing me to participate in the care of your patient.        Sincerely,        Romulo Walsh MD    Electronically signed

## 2025-03-19 NOTE — PROGRESS NOTES
DERMATOLOGIC SURGERY REPORT    NAME OF PROCEDURE:  EXCISION AND CLOSURE    Surgeon:  Romulo Walsh MD    PREOPERATIVE DIAGNOSIS: SCC  POSTOPERATIVE DIAGNOSIS: Same  Lesion Size: 20 mm lesion with 4 mm margins  Final excision size: 28 mm  Repair type: Complex  FINAL REPAIR LENGTH:  77 mm  Location: L forearm  Prior Biopsy Accession #: [id number]     INDICATIONS:Excision was indicated for treatment. We discussed the principles of treatment and most likely complications including bleeding, infection, wound dehiscence, pain, nerve damage, and scarring. Informed consent was obtained and the patient underwent the procedure as follows.    PROCEDURE:  The patient was taken to the operative suite. The treatment area was anesthetized with 1% lidocaine with 1:[id number] epinephrine buffered with bicarbonate. The area was washed with hibiclens, rinsed with saline and draped with sterile towels. The lesion was delineated and excised down to subcutaneous fat. Hemostasis was obtained by electrocoagulation.     REPAIR:  In order to repair this defect while maintaining the normal anatomic relations and function, we elected to utilize a linear closure. Closure was oriented so that the wound was in the patient's natural skin tension lines. Two redundant cones were removed by triangulation. Due to tightness of the surrounding skin deeper layers of the subcutaneous tissue were undermined extensively to a distance  greater than width of the defect on both sides by dissection in the subcutaneous plane until adequate tissue mobility was obtained. Deep layering suturing was performed using 3-0 monocryl deep, intradermal and subcutaneous sutures.  Final cutaneous approximation was achieved with 3-0 monocryl running subcuticular sutures.      A total of 10 mL of anesthesia was administered for all surgical sites. Estimated blood loss was less than 5 mL for all surgical sites. A sterile pressure dressing was applied and wound care instructions,  with a written handout, were given. The patient was discharged alert and ambulatory.    Romulo Walsh M.D.      Department of Dermatology

## 2025-03-19 NOTE — PATIENT INSTRUCTIONS
Excision Wound Care Instructions  I will experience scar, altered skin color, bleeding, swelling, pain, crusting and redness. I may experience altered sensation. Risks are excessive bleeding, infection, muscle weakness, thick (hypertrophic or keloidal) scar, and recurrence. A second procedure may be recommended to obtain the best cosmetic or functional result.  Possible complications of any surgical procedure are bleeding, infection, scarring, alteration in skin color and sensation, muscle weakness in the area, wound dehiscence or seperation, or recurrence of the lesion or disease. On occasion, after healing, a secondary procedure or revision may be recommended in order to obtain the best cosmetic or functional result.   After your surgery, a pressure bandage will be placed over the area that has sutures. This will help prevent bleeding. Please follow these instructions as they will help you to prevent complications as your wound heals.    For the First 24 hours After Surgery:  Leave the pressure bandage on and keep it dry. If it should come loose, you may retape it, but do not take it off.  Relax and take it easy. Do not do any vigorous exercise, heavy lifting, or bending forward. This could cause the wound to bleed.  Post-operative pain is usually mild. You may alternate between 1000 mg of Tylenol (acetaminophen) and 400 mg of Ibuprofen every 4 hours.  Do not take more than 4,000mg of acetaminophen in a 24 hour period or 3200 mg of Ibuprofen in a 24 hr period.  Avoid alcohol and vitamin E as these may increase your tendency to bleed.  You may put an ice pack around the bandaged area for 20 minutes every 2-3 hours. This may help reduce swelling, bruising, and pain. Make sure the ice pack is waterproof so that the pressure bandage does not get wet.   You may see a small amount of drainage or blood on your pressure bandage. This is normal. However, if drainage or bleeding continues or saturates the bandage, you will  need to apply firm pressure over the bandage with a washcloth for 15 minutes. If bleeding continues after applying pressure for 15 minutes then go to the nearest emergency room.    After the first 24 hours from Surgery  Carefully remove the bandage. Since your wound was closed with sutures underneath the skin and sealed with Derma-bond, you do not have to do daily wound care or dressing changes. The blue Derma-bond polymer takes the place of a bandage. It naturally detaches within a week. At that point the skin is knitted together and daily wound washing along with dressing changes are not required.     After the first 2 weeks from Surgery  At 2 weeks post surgery you can begin gently massaging the scar using light pressure applied in a circular motion. Do this for 5 minutes 3 times a day. Doing this consistently and regularly will improve the appearance of your final scar. You should continue to do this for AT LEAST 2 weeks, but ideally 4 in order to give your scar the best possible outcome        Call Us If:  You have pain that is not controlled with Tylenol/Ibuprofen  You have signs or symptoms of an infection, such as: fever over 100 degrees F, redness, warmth, or foul-smelling or yellow drainage from the wound.  Who should I call with questions?  [facility]: [phone]   [facility]: [phone]  [facility]: [phone]  For urgent needs outside of business hours call the [facility] at [phone] and ask to speak with the dermatology resident on call